# Patient Record
Sex: MALE | Race: WHITE | Employment: OTHER | URBAN - METROPOLITAN AREA
[De-identification: names, ages, dates, MRNs, and addresses within clinical notes are randomized per-mention and may not be internally consistent; named-entity substitution may affect disease eponyms.]

---

## 2018-08-20 ENCOUNTER — HOSPITAL ENCOUNTER (OUTPATIENT)
Dept: OTHER | Age: 83
Discharge: HOME | End: 2018-08-20
Attending: INTERNAL MEDICINE | Admitting: INTERNAL MEDICINE

## 2018-08-20 DIAGNOSIS — R10.84 ABDOMINAL PAIN, GENERALIZED: ICD-10-CM

## 2020-02-07 ENCOUNTER — HOSPITAL ENCOUNTER (OUTPATIENT)
Dept: GENERAL RADIOLOGY | Age: 85
Discharge: HOME | End: 2020-02-07
Payer: MEDICARE

## 2020-02-07 ENCOUNTER — HOSPITAL ENCOUNTER (OUTPATIENT)
Age: 85
Discharge: HOME | End: 2020-02-07
Payer: MEDICARE

## 2020-02-07 PROCEDURE — 73502 X-RAY EXAM HIP UNI 2-3 VIEWS: CPT

## 2020-02-19 ENCOUNTER — HOSPITAL ENCOUNTER (OUTPATIENT)
Age: 85
Discharge: HOME | End: 2020-02-19
Payer: MEDICARE

## 2020-02-19 ENCOUNTER — HOSPITAL ENCOUNTER (OUTPATIENT)
Dept: GENERAL RADIOLOGY | Age: 85
Discharge: HOME | End: 2020-02-19
Payer: MEDICARE

## 2020-02-19 PROCEDURE — 73030 X-RAY EXAM OF SHOULDER: CPT

## 2021-09-17 ENCOUNTER — HOSPITAL ENCOUNTER (OUTPATIENT)
Dept: WOUND CARE | Age: 86
Discharge: HOME | End: 2021-09-17
Payer: MEDICARE

## 2021-09-17 VITALS
DIASTOLIC BLOOD PRESSURE: 82 MMHG | WEIGHT: 189.82 LBS | RESPIRATION RATE: 16 BRPM | SYSTOLIC BLOOD PRESSURE: 147 MMHG | TEMPERATURE: 96.8 F | BODY MASS INDEX: 28.77 KG/M2 | HEIGHT: 68 IN | HEART RATE: 83 BPM

## 2021-09-17 PROCEDURE — 11042 DBRDMT SUBQ TIS 1ST 20SQCM/<: CPT

## 2021-09-17 PROCEDURE — 99203 OFFICE O/P NEW LOW 30 MIN: CPT

## 2021-09-17 ASSESSMENT — PAIN DESCRIPTION - FREQUENCY: FREQUENCY: INTERMITTENT

## 2021-09-17 ASSESSMENT — PAIN DESCRIPTION - LOCATION: LOCATION: LEG

## 2021-09-17 ASSESSMENT — PAIN SCALES - GENERAL: PAINLEVEL_OUTOF10: 7

## 2021-09-17 ASSESSMENT — PAIN DESCRIPTION - ORIENTATION: ORIENTATION: RIGHT

## 2021-09-17 ASSESSMENT — PAIN DESCRIPTION - DESCRIPTORS: DESCRIPTORS: TENDER

## 2021-09-17 ASSESSMENT — PAIN DESCRIPTION - PAIN TYPE: TYPE: ACUTE PAIN

## 2021-09-17 NOTE — PROGRESS NOTES
[facility] [place]      Elizabeth  RECORD NUMBER:  [id number]  AGE: 80 y.o. GENDER: male  : 5/3/1930  EPISODE DATE:  2021    Subjective:     Chief Complaint   Patient presents with    Wound Check     initial         HISTORY of PRESENT ILLNESS HPI     Barbie Holman is a 80 y.o. male who presents today for wound/ulcer evaluation. History of Wound Context: RIGHT posterior leg wound of uncertain etiology, but tenderness and drainage are of concern  Wound/Ulcer Pain Timing/Severity: intermittent, mild  Quality of pain: burning  Severity:  4 / 10   Modifying Factors: Pain worsens with debridement  Associated Signs/Symptoms: drainage and pain    Ulcer Identification:  Ulcer Type: venous    Contributing Factors: edema, decreased mobility and shear force    Acute Wound: Abrasion        PAST MEDICAL HISTORY        Diagnosis Date    Anxiety     Arthritis     Cancer ([address]     prostate    Cancer ([facility]     skin    History of blood clots     Hx of blood clots     MRSA infection 3/13/14    leg wound       PAST SURGICAL HISTORY    Past Surgical History:   Procedure Laterality Date    JOINT REPLACEMENT Right 2005    hip    KIDNEY SURGERY  2019    PROSTATE SURGERY         FAMILY HISTORY    History reviewed. No pertinent family history.     SOCIAL HISTORY    Social History     Tobacco Use    Smoking status: Former Smoker     Quit date: 3/13/1980     Years since quittin.5    Smokeless tobacco: Never Used    Tobacco comment: pipe smoker    Substance Use Topics    Alcohol use: Yes     Comment: beer occasional    Drug use: No       ALLERGIES    Allergies   Allergen Reactions    Sulfamethoxazole-Trimethoprim Rash     He claims he doesn't have allergies       MEDICATIONS    Current Outpatient Medications on File Prior to Encounter   Medication Sig Dispense Refill    aspirin 81 MG tablet Take 81 mg by mouth daily      ALPRAZolam (XANAX) 0.25 MG tablet Take 0.25 mg by mouth nightly as needed for Sleep      Multiple Vitamins-Minerals (MULTI VITAMIN/MINERALS) TABS Take  by mouth. No current facility-administered medications on file prior to encounter. REVIEW OF SYSTEMS    Pertinent items are noted in HPI. Last I2P if applicable: No results found for: LABA1C    Objective:      BP (!) 147/82   Pulse 83   Temp 96.8 °F (36 °C) (Temporal)   Resp 16   Ht 5' 8\" (1.727 m)   Wt 189 lb 13.1 oz (86.1 kg)   BMI 28.86 kg/m²     Wt Readings from Last 3 Encounters:   09/17/21 189 lb 13.1 oz (86.1 kg)       PHYSICAL EXAM    General Appearance: alert and oriented to person, place and time, well-developed and well-nourished, in no acute distress      Assessment:      No diagnosis found. Procedure Note  Indications:  Based on my examination of this patient's wound(s)/ulcer(s) today, debridement is required to promote healing and evaluate the wound base. Performed by: Dieter Crespo DPM    Consent obtained:  Yes    Time out taken:  Yes    Pain Control: Anesthetic  Anesthetic: 4% Lidocaine Liquid Topical       Debridement: Excisional Debridement    Using curette, scissors and forceps the wound(s)/ulcer(s) was/were debrided down through and including the removal of epidermis, dermis and subcutaneous tissue. Devitalized Tissue Debrided:  fibrin, biofilm, exudate and callus    Pre Debridement Measurements:  Are located in the [facility]  Documentation Flow Sheet    Wound/Ulcer #: 1    Post Debridement Measurements:  Wound/Ulcer Descriptions are Pre Debridement except measurements:    Wound 09/17/21 Leg Posterior;Right; Lower #1 (Active)   Wound Image   09/17/21 1328   Wound Etiology Venous 09/17/21 1415   Wound Cleansed Cleansed with saline 09/17/21 1328   Dressing/Treatment Honey gel/honey paste 09/17/21 1410   Wound Length (cm) 0.7 cm 09/17/21 1328   Wound Width (cm) 0.7 cm 09/17/21 1328   Wound Depth (cm) 0.2 cm 09/17/21 1328   Wound Surface Area (cm^2) 0.49 cm^2 09/17/21 1328   Wound Volume (cm^3) 0.098 cm^3 09/17/21 1328   Post-Procedure Length (cm) 0.8 cm 09/17/21 1406   Post-Procedure Width (cm) 0.8 cm 09/17/21 1406   Post-Procedure Depth (cm) 0.4 cm 09/17/21 1406   Post-Procedure Surface Area (cm^2) 0.64 cm^2 09/17/21 1406   Post-Procedure Volume (cm^3) 0.256 cm^3 09/17/21 1406   Distance Tunneling (cm) 0 cm 09/17/21 1328   Undermining Maxium Distance (cm) 0 09/17/21 1328   Wound Assessment Fibrin 09/17/21 1328   Drainage Amount Small 09/17/21 1328   Drainage Description Black 09/17/21 1328   Odor None 09/17/21 1328   Jacque-wound Assessment Fragile; Hemosiderin staining (brown yellow) 09/17/21 1328   Margins Defined edges 09/17/21 1328   Wound Thickness Description not for Pressure Injury Full thickness 09/17/21 1328   Number of days: 0          Percent of Wound(s)/Ulcer(s) Debrided: 100%    Total Surface Area Debrided:  0.64 sq cm     Diabetic/Pressure/Non Pressure Ulcers only:  Ulcer: Non-Pressure ulcer, fat layer exposed     Estimated Blood Loss:  Minimal    Hemostasis Achieved:  by pressure    Procedural Pain:  4  / 10     Post Procedural Pain:  1 / 10     Response to treatment:  Well tolerated by patient. Plan:   Plan is for serial debridement with applications of MediHoney and light compression, with weekly follow up    Treatment Note please see attached Discharge Instructions    New Medication(s) at this visit:   New Prescriptions    No medications on file       Other orders at this visit: No orders of the defined types were placed in this encounter. Smoking Cessation: Counseling given: Not Answered  Comment: pipe smoker       Written patient dismissal instructions given to patient and signed by patient or POA. Discharge [facility] Physician Orders and Discharge Instructions  [address] [place] [address]  Telephone: [phone]  NAME:  Louie Miles  YOB: 1930  MEDICAL RECORD NUMBER:  [id number]  DATE:  9/17/2021    Home Health Care:  [facility] Phone: [phone]  Fax: [phone]      Medically necessary services: [x] Skilled Nursing [] PT [] OT [] Social Work [] Dietician [] Other:    Wound Cleansing:   [x]0.9% Saline  []Vashe (Soak piece of gauze and place on wound bed for 5 minutes) []Other:    Jacque Wound Topical Treatments:  [] moisturizing lotion [] antifungal ointment [] No-Sting barrier film [] zinc paste [] Other:     Dressings:           Wound Location: right lower leg    Apply Primary Dressing to wound:       Honey gel     Cover and Secure with:     Cover and Secure with: 4X4 gauze pad  Conforming roll gauze   Avoid contact of tape with skin if possible.  Change dressing: [x] Daily (until the following date:____)   [] Every Other Day [] Once a week [] Do Not Change Dressing [] Other:  [] Three times per week: [] Monday, Wednesday, Friday [] Tuesday, Thursday, Saturday   . Change dressing:   Edema Control:  Apply: [] Compression Stocking  [] Left Lower Leg [] Right Lower Leg      [x]  Spandagrip   [] Left Lower Leg [x] Right Lower Leg    Strength: Medium compression 10-20 mm/Hg     Apply every morning immediately when getting up. They should be applied to affected leg(s) from mid foot to knee making sure to cover the heel. Remove every night before going to bed. [x] Elevate leg(s) above the level of the heart for 30 minutes 4-5 times a day and/or when sitting. [x] Avoid prolonged standing in one place. Dietary:  Important dietary reminders:  1. Increase Protein intake (i.e. Lean meats, fish, eggs, legumes, and yogurt)  2. No added salt  3. If diabetic, follow a diabetic diet and check glucose prior to meals or as instructed by your physician.     Dietary Supplements: []Remington []30ml ProStat []Ensure Flor Kamryn []Ensure Max/Premier []Other:  Take supplements twice a day or as directed as followed: If you are still having pain after you go home:   For wounds on lower legs or arms, elevate the affected limb.  Use over-the-counter medications you would normally use for pain as permitted by your primary care doctor.  For persistent pain not relieved by the above interventions, please call your primary care doctor. Return Appointment:   Return Appointment: With Dr Starla Almodovar  in  [facility])  o Schedule weekly until (Date):        [x] Orders placed during your visit: No orders of the defined types were placed in this encounter. Your nurse  is:  Martha Shaikh     Electronically signed by Jorge Avila RN on 9/17/2021 at 2:13 PM     [address] Information: Should you experience any significant changes in your wound(s) or have questions about your wound care, please contact the [address] [place] [address] at [phone]. Our hours vary so please leave a message. Please give us 24-48 hours to return your call. If you need help with your wounds and cannot wait until we are available, contact your PCP or go to the hospital emergency room. Call your doctor now or seek immediate medical care if:    · You have symptoms of infection, such as:  ? Increased pain, swelling, warmth, or redness. ? Red streaks leading from the area. ? Pus draining from the area. ? A fever.  Physician for this visit and orders:  Naga Bautista DPM 9/17/2021    [] Patient unable to sign Discharge Instructions given to ECF/Transportation/POA              Electronically signed by Naga Bautista DPM on 9/17/2021 at 3:21 PM

## 2021-09-24 ENCOUNTER — HOSPITAL ENCOUNTER (OUTPATIENT)
Dept: WOUND CARE | Age: 86
Discharge: HOME | End: 2021-09-24
Payer: MEDICARE

## 2021-09-24 VITALS
TEMPERATURE: 97.8 F | HEART RATE: 75 BPM | SYSTOLIC BLOOD PRESSURE: 132 MMHG | RESPIRATION RATE: 16 BRPM | DIASTOLIC BLOOD PRESSURE: 76 MMHG

## 2021-09-24 DIAGNOSIS — L97.313 NON-PRESSURE CHRONIC ULCER OF RIGHT ANKLE WITH NECROSIS OF MUSCLE (HCC): ICD-10-CM

## 2021-09-24 PROCEDURE — 11043 DBRDMT MUSC&/FSCA 1ST 20/<: CPT

## 2021-09-24 RX ORDER — CLOBETASOL PROPIONATE 0.5 MG/G
OINTMENT TOPICAL ONCE
OUTPATIENT
Start: 2021-09-24 | End: 2021-09-24

## 2021-09-24 RX ORDER — BETAMETHASONE DIPROPIONATE 0.05 %
OINTMENT (GRAM) TOPICAL ONCE
OUTPATIENT
Start: 2021-09-24 | End: 2021-09-24

## 2021-09-24 RX ORDER — LIDOCAINE HYDROCHLORIDE 40 MG/ML
SOLUTION TOPICAL ONCE
Status: CANCELLED | OUTPATIENT
Start: 2021-09-24 | End: 2021-09-24

## 2021-09-24 RX ORDER — LIDOCAINE HYDROCHLORIDE 40 MG/ML
2.5 SOLUTION TOPICAL ONCE
Status: DISCONTINUED | OUTPATIENT
Start: 2021-09-24 | End: 2021-09-25 | Stop reason: HOSPADM

## 2021-09-24 RX ORDER — LIDOCAINE 40 MG/G
CREAM TOPICAL ONCE
OUTPATIENT
Start: 2021-09-24 | End: 2021-09-24

## 2021-09-24 RX ORDER — GINSENG 100 MG
CAPSULE ORAL ONCE
OUTPATIENT
Start: 2021-09-24 | End: 2021-09-24

## 2021-09-24 RX ORDER — LIDOCAINE HYDROCHLORIDE 20 MG/ML
JELLY TOPICAL ONCE
OUTPATIENT
Start: 2021-09-24 | End: 2021-09-24

## 2021-09-24 RX ORDER — LIDOCAINE 50 MG/G
OINTMENT TOPICAL ONCE
OUTPATIENT
Start: 2021-09-24 | End: 2021-09-24

## 2021-09-24 RX ORDER — BACITRACIN, NEOMYCIN, POLYMYXIN B 400; 3.5; 5 [USP'U]/G; MG/G; [USP'U]/G
OINTMENT TOPICAL ONCE
OUTPATIENT
Start: 2021-09-24 | End: 2021-09-24

## 2021-09-24 RX ORDER — BACITRACIN ZINC AND POLYMYXIN B SULFATE 500; 1000 [USP'U]/G; [USP'U]/G
OINTMENT TOPICAL ONCE
OUTPATIENT
Start: 2021-09-24 | End: 2021-09-24

## 2021-09-24 RX ORDER — GENTAMICIN SULFATE 1 MG/G
OINTMENT TOPICAL ONCE
OUTPATIENT
Start: 2021-09-24 | End: 2021-09-24

## 2021-09-24 ASSESSMENT — PAIN DESCRIPTION - PAIN TYPE: TYPE: ACUTE PAIN

## 2021-09-24 ASSESSMENT — PAIN DESCRIPTION - DESCRIPTORS: DESCRIPTORS: TENDER

## 2021-09-24 ASSESSMENT — PAIN DESCRIPTION - FREQUENCY: FREQUENCY: INTERMITTENT

## 2021-09-24 ASSESSMENT — PAIN DESCRIPTION - ORIENTATION: ORIENTATION: RIGHT

## 2021-09-24 ASSESSMENT — PAIN SCALES - GENERAL: PAINLEVEL_OUTOF10: 7

## 2021-09-24 ASSESSMENT — PAIN DESCRIPTION - LOCATION: LOCATION: LEG

## 2021-09-24 NOTE — PROGRESS NOTES
[facility]  Progress Note and Procedure Note      Temitope Galvan RECORD NUMBER:  [id number]  AGE: 80 y.o. GENDER: male  : 5/3/1930  EPISODE DATE:  2021    Subjective:     Chief Complaint   Patient presents with    Wound Check     right leg         HISTORY of PRESENT ILLNESS HPI     Dwight Ro is a 80 y.o. male who presents today for wound/ulcer evaluation. History of Wound Context: RIGHT posterior calf wound is smaller with greater granulation after having used MediHoney  Wound/Ulcer Pain Timing/Severity: intermittent, moderate  Quality of pain: burning  Severity:  3  10   Modifying Factors: Pain worsens with debridement  Associated Signs/Symptoms: edema and pain    Ulcer Identification:  Ulcer Type: venous    Contributing Factors: edema, venous stasis and shear force    Acute Wound: N/A        PAST MEDICAL HISTORY        Diagnosis Date    Anxiety     Arthritis     Cancer ([address]     prostate    Cancer ([facility]     skin    History of blood clots     Hx of blood clots     MRSA infection 3/13/14    leg wound       PAST SURGICAL HISTORY    Past Surgical History:   Procedure Laterality Date    JOINT REPLACEMENT Right 2005    hip    KIDNEY SURGERY  2019    PROSTATE SURGERY         FAMILY HISTORY    History reviewed. No pertinent family history.     SOCIAL HISTORY    Social History     Tobacco Use    Smoking status: Former Smoker     Quit date: 3/13/1980     Years since quittin.5    Smokeless tobacco: Never Used    Tobacco comment: pipe smoker    Substance Use Topics    Alcohol use: Yes     Comment: beer occasional    Drug use: No       ALLERGIES    Allergies   Allergen Reactions    Sulfamethoxazole-Trimethoprim Rash     He claims he doesn't have allergies       MEDICATIONS    Current Outpatient Medications on File Prior to Encounter   Medication Sig Dispense Refill    aspirin 81 MG tablet Take 81 mg by mouth daily      ALPRAZolam (XANAX) 0.25 MG tablet Take 0.25 mg by mouth nightly as needed for Sleep      Multiple Vitamins-Minerals (MULTI VITAMIN/MINERALS) TABS Take  by mouth. No current facility-administered medications on file prior to encounter. REVIEW OF SYSTEMS    Pertinent items are noted in HPI. Last P6V if applicable: No results found for: LABA1C    Objective:      /76   Pulse 75   Temp 97.8 °F (36.6 °C) (Temporal)   Resp 16     Wt Readings from Last 3 Encounters:   09/17/21 189 lb 13.1 oz (86.1 kg)       PHYSICAL EXAM    General Appearance: alert and oriented to person, place and time, well-developed and well-nourished, in no acute distress      Assessment:      1. 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 Performed by: Konrad Castro DPM    Consent obtained:  Yes    Time out taken:  Yes    Pain Control: Anesthetic  Anesthetic: 4% Lidocaine Liquid Topical       Debridement: Excisional Debridement    Using curette and forceps the wound(s)/ulcer(s) was/were debrided down through and including the removal of epidermis, dermis, subcutaneous tissue and muscle/fascia. Devitalized Tissue Debrided:  fibrin, biofilm, slough, exudate and callus    Pre Debridement Measurements:  Are located in the [place]  Documentation Flow Sheet    Wound/Ulcer #: 1    Post Debridement Measurements:  Wound/Ulcer Descriptions are Pre Debridement except measurements:    Wound 09/17/21 Leg Posterior;Right; Lower #1 (Active)   Wound Image   09/17/21 1328   Wound Etiology Venous 09/17/21 1415   Wound Cleansed Cleansed with saline 09/24/21 1315   Dressing/Treatment Collagen with Ag 09/24/21 1335   Wound Length (cm) 0.2 cm 09/24/21 1315   Wound Width (cm) 0.2 cm 09/24/21 1315   Wound Depth (cm) 0.2 cm 09/24/21 1315   Wound Surface Area (cm^2) 0.04 cm^2 09/24/21 1315   Change in Wound Size % (l*w) 91.84 09/24/21 1315   Wound Volume (cm^3) 0.008 cm^3 09/24/21 1315   Wound Healing % 92 09/24/21 1315   Post-Procedure Length (cm) 0.3 cm 09/24/21 1335   Post-Procedure Width (cm) 0.3 cm 09/24/21 1335   Post-Procedure Depth (cm) 0.4 cm 09/24/21 1335   Post-Procedure Surface Area (cm^2) 0.09 cm^2 09/24/21 1335   Post-Procedure Volume (cm^3) 0.036 cm^3 09/24/21 1335   Distance Tunneling (cm) 0 cm 09/24/21 1315   Undermining Maxium Distance (cm) 0 09/24/21 1315   Wound Assessment Fibrin 09/24/21 1315   Drainage Amount Small 09/24/21 1315   Drainage Description Yellow 09/24/21 1315   Odor None 09/24/21 1315   Jacque-wound Assessment Fragile; Hemosiderin staining (brown yellow) 09/24/21 1315   Margins Defined edges 09/24/21 1315   Wound Thickness Description not for Pressure Injury Full thickness 09/24/21 1315   Number of days: 7          Percent of Wound(s)/Ulcer(s) Debrided: 100%    Total Surface Area Debrided:  0.09 sq cm     Diabetic/Pressure/Non Pressure Ulcers only:  Ulcer: Non-Pressure ulcer, muscle necrosis     Estimated Blood Loss:  Minimal    Hemostasis Achieved:  by pressure    Procedural Pain:  5  / 10     Post Procedural Pain:  2 / 10     Response to treatment:  Well tolerated by patient. Plan:   Conversion from [facility] to [facility] now that wound has greater granulation    Treatment Note please see attached Discharge Instructions    New Medication(s) at this visit:   New Prescriptions    No medications on file       Other orders at this visit: No orders of the defined types were placed in this encounter. Smoking Cessation: Counseling given: Not Answered  Comment: pipe smoker       Written patient dismissal instructions given to patient and signed by patient or POA. Discharge [address] [facility] Physician Orders and Discharge Instructions  The [address] [place] [address] E. [address] [place] [address]. [facility]  Telephone: [phone]  NAME:  Fermin Lenz OF BIRTH:  5/3/1930  MEDICAL RECORD NUMBER:  [id number]  DATE:  9/24/2021    Home Health Care:  [facility] Phone: [phone]  Fax: [phone]      Medically necessary services: [x] Skilled Nursing [] PT [] OT [] Social Work [] Dietician [] Other:    Wound Cleansing:   [x]0.9% Saline  []Vashe (Soak piece of gauze and place on wound bed for 5 minutes) []Other:    Jacque Wound Topical Treatments:  [] moisturizing lotion [] antifungal ointment [] No-Sting barrier film [] zinc paste [] Other:     Dressings:           Wound Location: right lower leg    Apply Primary Dressing to wound:       Collagen with silver     Cover and Secure with:     Cover and Secure with: 4X4 gauze pad  Conforming roll gauze   Avoid contact of tape with skin if possible.  Change dressing: [] Daily (until the following date:____)   [] Every Other Day [] Once a week [] Do Not Change Dressing [] Other:  [x] Three times per week: [x] Monday, Wednesday, Friday [] Tuesday, Thursday, Saturday          Edema Control:  Apply: [] Compression Stocking  [] Left Lower Leg [] Right Lower Leg      [x]  Spandagrip   [] Left Lower Leg [x] Right Lower Leg    Strength: Medium compression 10-20 mm/Hg     Apply every morning immediately when getting up. They should be applied to affected leg(s) from mid foot to knee making sure to cover the heel. Remove every night before going to bed. [x] Elevate leg(s) above the level of the heart for 30 minutes 4-5 times a day and/or when sitting. [x] Avoid prolonged standing in one place. Dietary:  Important dietary reminders:  1. Increase Protein intake (i.e. Lean meats, fish, eggs, legumes, and yogurt)  2. No added salt  3. If diabetic, follow a diabetic diet and check glucose prior to meals or as instructed by your physician.     Dietary Supplements: []Remington []30ml ProStat []Ensure Enlive []Ensure Max/Premier []Other:  Take supplements twice a day or as directed as followed: If you are still having pain after you go home:   For wounds on lower legs or arms, elevate the affected limb.  Use over-the-counter medications you would normally use for pain as permitted by your primary care doctor.  For persistent pain not relieved by the above interventions, please call your primary care doctor. Return Appointment:   Return Appointment: With Dr Rosana Smith  in  1 [facility])  o Schedule weekly until (Date):      o All other future appointments:  No future appointments. [x] Orders placed during your visit: No orders of the defined types were placed in this encounter. Your nurse  is:  Brandon Abdalla     Electronically signed by Stiven Power RN on 9/24/2021 at [facility] Information: Should you experience any significant changes in your wound(s) or have questions about your wound care, please contact the [address] [place] [address] at [phone]. Our hours vary so please leave a message. Please give us 24-48 hours to return your call. If you need help with your wounds and cannot wait until we are available, contact your PCP or go to the hospital emergency room. Call your doctor now or seek immediate medical care if:    · You have symptoms of infection, such as:  ? Increased pain, swelling, warmth, or redness. ? Red streaks leading from the area. ? Pus draining from the area. ? A fever.  Physician for this visit and orders:  Yvonne Michael DPM 9/24/2021    [] Patient unable to sign Discharge Instructions given to ECF/Transportation/POA              Electronically signed by Yvonne Michael DPM on 9/24/2021 at 1:43 PM

## 2021-10-01 ENCOUNTER — HOSPITAL ENCOUNTER (OUTPATIENT)
Dept: WOUND CARE | Age: 86
Discharge: HOME | End: 2021-10-01
Payer: MEDICARE

## 2021-10-01 VITALS
TEMPERATURE: 97.8 F | SYSTOLIC BLOOD PRESSURE: 163 MMHG | DIASTOLIC BLOOD PRESSURE: 86 MMHG | HEART RATE: 71 BPM | RESPIRATION RATE: 18 BRPM

## 2021-10-01 DIAGNOSIS — L97.313 NON-PRESSURE CHRONIC ULCER OF RIGHT ANKLE WITH NECROSIS OF MUSCLE (HCC): Primary | ICD-10-CM

## 2021-10-01 PROCEDURE — 99212 OFFICE O/P EST SF 10 MIN: CPT

## 2021-10-01 PROCEDURE — 6370000000 HC RX 637 (ALT 250 FOR IP): Performed by: PODIATRIST

## 2021-10-01 RX ORDER — BACITRACIN, NEOMYCIN, POLYMYXIN B 400; 3.5; 5 [USP'U]/G; MG/G; [USP'U]/G
OINTMENT TOPICAL ONCE
OUTPATIENT
Start: 2021-10-01 | End: 2021-10-01

## 2021-10-01 RX ORDER — LIDOCAINE 40 MG/G
CREAM TOPICAL ONCE
OUTPATIENT
Start: 2021-10-01 | End: 2021-10-01

## 2021-10-01 RX ORDER — BETAMETHASONE DIPROPIONATE 0.05 %
OINTMENT (GRAM) TOPICAL ONCE
OUTPATIENT
Start: 2021-10-01 | End: 2021-10-01

## 2021-10-01 RX ORDER — GINSENG 100 MG
CAPSULE ORAL ONCE
OUTPATIENT
Start: 2021-10-01 | End: 2021-10-01

## 2021-10-01 RX ORDER — LIDOCAINE 50 MG/G
OINTMENT TOPICAL ONCE
OUTPATIENT
Start: 2021-10-01 | End: 2021-10-01

## 2021-10-01 RX ORDER — CLOBETASOL PROPIONATE 0.5 MG/G
OINTMENT TOPICAL ONCE
OUTPATIENT
Start: 2021-10-01 | End: 2021-10-01

## 2021-10-01 RX ORDER — BACITRACIN ZINC AND POLYMYXIN B SULFATE 500; 1000 [USP'U]/G; [USP'U]/G
OINTMENT TOPICAL ONCE
OUTPATIENT
Start: 2021-10-01 | End: 2021-10-01

## 2021-10-01 RX ORDER — LIDOCAINE HYDROCHLORIDE 20 MG/ML
JELLY TOPICAL ONCE
OUTPATIENT
Start: 2021-10-01 | End: 2021-10-01

## 2021-10-01 RX ORDER — LIDOCAINE HYDROCHLORIDE 40 MG/ML
SOLUTION TOPICAL ONCE
Status: COMPLETED | OUTPATIENT
Start: 2021-10-01 | End: 2021-10-01

## 2021-10-01 RX ORDER — LIDOCAINE HYDROCHLORIDE 40 MG/ML
SOLUTION TOPICAL ONCE
OUTPATIENT
Start: 2021-10-01 | End: 2021-10-01

## 2021-10-01 RX ORDER — GENTAMICIN SULFATE 1 MG/G
OINTMENT TOPICAL ONCE
OUTPATIENT
Start: 2021-10-01 | End: 2021-10-01

## 2021-10-01 RX ADMIN — LIDOCAINE HYDROCHLORIDE: 40 SOLUTION TOPICAL at 14:31

## 2021-10-01 NOTE — PROGRESS NOTES
[facility] [place] and [place] Note      Joan Chen RECORD NUMBER:  [id number]  AGE: 80 y.o. GENDER: male  : 5/3/1930  EPISODE DATE:  10/1/2021    Subjective:     Chief Complaint   Patient presents with    Wound Check     right leg         HISTORY of PRESENT ILLNESS HPI     Sylvia Sneed is a 80 y.o. male who presents today for wound/ulcer evaluation. History of Wound Context: RIGHT posterior calf wound now resolved  Wound/Ulcer Pain Timing/Severity: none  Quality of pain: N/A  Severity:  0 / 10   Modifying Factors: None  Associated Signs/Symptoms: edema    Ulcer Identification:  Ulcer Type: venous    Contributing Factors: edema and shear force    Acute Wound: N/A        PAST MEDICAL HISTORY        Diagnosis Date    Anxiety     Arthritis     Cancer ([address]     prostate    Cancer [address] [address]     skin    History of blood clots     Hx of blood clots     MRSA infection 3/13/14    leg wound       PAST SURGICAL HISTORY    Past Surgical History:   Procedure Laterality Date    JOINT REPLACEMENT Right 2005    hip    KIDNEY SURGERY  2019    PROSTATE SURGERY         FAMILY HISTORY    History reviewed. No pertinent family history.     SOCIAL HISTORY    Social History     Tobacco Use    Smoking status: Former Smoker     Quit date: 3/13/1980     Years since quittin.5    Smokeless tobacco: Never Used    Tobacco comment: pipe smoker    Substance Use Topics    Alcohol use: Yes     Comment: beer occasional    Drug use: No       ALLERGIES    Allergies   Allergen Reactions    Sulfamethoxazole-Trimethoprim Rash     He claims he doesn't have allergies       MEDICATIONS    Current Outpatient Medications on File Prior to Encounter   Medication Sig Dispense Refill    aspirin 81 MG tablet Take 81 mg by mouth daily      ALPRAZolam (XANAX) 0.25 MG tablet Take 0.25 mg by mouth nightly as needed for Sleep      Multiple Vitamins-Minerals (MULTI VITAMIN/MINERALS) TABS Take  by mouth.       No current facility-administered medications on file prior to encounter. REVIEW OF SYSTEMS    Pertinent items are noted in HPI. Last J0K if applicable: No results found for: LABA1C    Objective:      BP (!) 163/86   Pulse 71   Temp 97.8 °F (36.6 °C) (Temporal)   Resp 18     Wt Readings from Last 3 Encounters:   09/17/21 189 lb 13.1 oz (86.1 kg)       PHYSICAL EXAM    General Appearance: alert and oriented to person, place and time, well-developed and well-nourished, in no acute distress      Assessment:      1. 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 Performed by: Jonelle Sykse DPM    Consent obtained:  No: no    Time out taken:  No: no    Pain Control: Anesthetic  Anesthetic: 4% Lidocaine Liquid Topical       Debridement: Other: NONE    Using none the wound(s)/ulcer(s) was/were not debrided down through and including the removal of none. Devitalized Tissue Debrided:  none    Pre Debridement Measurements:  Are located in the Wound/Ulcer Documentation Flow Sheet    Wound/Ulcer #: 1    Post Debridement Measurements:  Wound/Ulcer Descriptions are Pre Debridement except measurements:            Percent of Wound(s)/Ulcer(s) Debrided: 0%    Total Surface Area Debrided:  00 sq cm     Diabetic/Pressure/Non Pressure Ulcers only:  Ulcer: Non-Pressure ulcer, limited to breakdown of skin     Estimated Blood Loss:  None    Hemostasis Achieved:  not needed    Procedural Pain:  0  / 10     Post Procedural Pain:  0 / 10     Response to treatment:  Well tolerated by patient.      Plan:   Continued antibacterial dressing to area for hygiene, but otherwise healed wound, no further care necessary    Treatment Note please see attached Discharge Instructions    New Medication(s) at this visit:   New Prescriptions    No medications on file       Other orders at this visit:   Orders Placed This Encounter   Procedures    Initiate Outpatient Wound Care Protocol       Smoking Cessation: Counseling given: Not Answered  Comment: pipe smoker       Written patient dismissal instructions given to patient and signed by patient or POA. Discharge Elwood Lázaro   [facility]  Telephone: [phone]    NAME:  Diony Reynoso OF BIRTH:  5/3/1930  MEDICAL RECORD NUMBER:  [id number]  DATE:  10/1/2021      Congratulations! You have completed your treatment program!    To prevent Venous Wounds from recurring again:  · Elevate your legs at least parallel to the ground while sitting. · Wear your prescription support stockings everyday and remove at night while you sleep. · Replace your stockings every 3-6 months so that your legs continue to get the support they need. · Inspect your legs and feet daily and report any increased swelling, unusual redness or new wounds to your physician. · Wash your legs and feet regularly with mild soap and water and moisturize daily. · Continue to use compression pumps if prescribed by your physician. · Avoid overeating. Extra weight adds pressure on the veins in your legs. · Limit salty foods as they promote swelling or fluid retention in your legs. Call the [facility] [address] if your wound reopens, if new wounds occur, or if you have any other questions about your follow up care [phone].      [] Patient unable to sign Discharge Instructions given to ECF/Transportation/POA    Electronically signed by Dayanna Oliveira RN on 10/1/2021 at 2:51 PM            Electronically signed by Greer Cain DPM on 10/1/2021 at 2:55 PM

## 2022-05-12 ENCOUNTER — HOSPITAL ENCOUNTER (INPATIENT)
Age: 87
LOS: 5 days | Discharge: HOME HEALTH | DRG: 175 | End: 2022-05-18
Attending: EMERGENCY MEDICINE | Admitting: INTERNAL MEDICINE
Payer: MEDICARE

## 2022-05-12 ENCOUNTER — APPOINTMENT (OUTPATIENT)
Dept: GENERAL RADIOLOGY | Age: 87
DRG: 175 | End: 2022-05-12
Payer: MEDICARE

## 2022-05-12 DIAGNOSIS — R07.9 CHEST PAIN, UNSPECIFIED TYPE: ICD-10-CM

## 2022-05-12 DIAGNOSIS — J18.9 PNEUMONIA OF LEFT LOWER LOBE DUE TO INFECTIOUS ORGANISM: Primary | ICD-10-CM

## 2022-05-12 DIAGNOSIS — N17.9 AKI (ACUTE KIDNEY INJURY) (HCC): ICD-10-CM

## 2022-05-12 LAB
ANION GAP SERPL CALCULATED.3IONS-SCNC: 13 MMOL/L (ref 3–16)
BASE EXCESS VENOUS: 0.5 MMOL/L (ref -2–3)
BASOPHILS ABSOLUTE: 0 K/UL (ref 0–0.2)
BASOPHILS RELATIVE PERCENT: 0.4 %
BUN BLDV-MCNC: 26 MG/DL (ref 7–20)
CALCIUM SERPL-MCNC: 9.4 MG/DL (ref 8.3–10.6)
CARBOXYHEMOGLOBIN: 1.4 % (ref 0–1.5)
CHLORIDE BLD-SCNC: 102 MMOL/L (ref 99–110)
CO2: 22 MMOL/L (ref 21–32)
CREAT SERPL-MCNC: 2 MG/DL (ref 0.8–1.3)
EOSINOPHILS ABSOLUTE: 0.1 K/UL (ref 0–0.6)
EOSINOPHILS RELATIVE PERCENT: 0.9 %
GFR AFRICAN AMERICAN: 38
GFR NON-AFRICAN AMERICAN: 31
GLUCOSE BLD-MCNC: 127 MG/DL (ref 70–99)
HCO3 VENOUS: 24.7 MMOL/L (ref 24–28)
HCT VFR BLD CALC: 39.5 % (ref 40.5–52.5)
HEMOGLOBIN, VEN, REDUCED: 18.1 %
HEMOGLOBIN: 13.3 G/DL (ref 13.5–17.5)
LYMPHOCYTES ABSOLUTE: 1.5 K/UL (ref 1–5.1)
LYMPHOCYTES RELATIVE PERCENT: 21.2 %
MCH RBC QN AUTO: 32.1 PG (ref 26–34)
MCHC RBC AUTO-ENTMCNC: 33.6 G/DL (ref 31–36)
MCV RBC AUTO: 95.6 FL (ref 80–100)
METHEMOGLOBIN VENOUS: 0.1 % (ref 0–1.5)
MONOCYTES ABSOLUTE: 0.7 K/UL (ref 0–1.3)
MONOCYTES RELATIVE PERCENT: 9.9 %
NEUTROPHILS ABSOLUTE: 4.8 K/UL (ref 1.7–7.7)
NEUTROPHILS RELATIVE PERCENT: 67.6 %
O2 SAT, VEN: 82 %
PCO2, VEN: 37.5 MMHG (ref 41–51)
PDW BLD-RTO: 14.1 % (ref 12.4–15.4)
PH VENOUS: 7.43 (ref 7.35–7.45)
PLATELET # BLD: 132 K/UL (ref 135–450)
PMV BLD AUTO: 7.7 FL (ref 5–10.5)
PO2, VEN: 43.2 MMHG (ref 25–40)
POTASSIUM REFLEX MAGNESIUM: 4 MMOL/L (ref 3.5–5.1)
PRO-BNP: 880 PG/ML (ref 0–449)
RBC # BLD: 4.14 M/UL (ref 4.2–5.9)
SODIUM BLD-SCNC: 137 MMOL/L (ref 136–145)
TCO2 CALC VENOUS: 26 MMOL/L
TROPONIN: <0.01 NG/ML
WBC # BLD: 7.1 K/UL (ref 4–11)

## 2022-05-12 PROCEDURE — 80048 BASIC METABOLIC PNL TOTAL CA: CPT

## 2022-05-12 PROCEDURE — 36415 COLL VENOUS BLD VENIPUNCTURE: CPT

## 2022-05-12 PROCEDURE — 85025 COMPLETE CBC W/AUTO DIFF WBC: CPT

## 2022-05-12 PROCEDURE — 84484 ASSAY OF TROPONIN QUANT: CPT

## 2022-05-12 PROCEDURE — 82803 BLOOD GASES ANY COMBINATION: CPT

## 2022-05-12 PROCEDURE — 93005 ELECTROCARDIOGRAM TRACING: CPT | Performed by: EMERGENCY MEDICINE

## 2022-05-12 PROCEDURE — 83880 ASSAY OF NATRIURETIC PEPTIDE: CPT

## 2022-05-12 PROCEDURE — 71046 X-RAY EXAM CHEST 2 VIEWS: CPT

## 2022-05-12 PROCEDURE — 99285 EMERGENCY DEPT VISIT HI MDM: CPT

## 2022-05-12 ASSESSMENT — ENCOUNTER SYMPTOMS
NAUSEA: 0
ABDOMINAL PAIN: 0
COUGH: 0
SHORTNESS OF BREATH: 0
VOMITING: 0

## 2022-05-12 ASSESSMENT — PAIN DESCRIPTION - ORIENTATION: ORIENTATION: LEFT

## 2022-05-12 ASSESSMENT — PAIN - FUNCTIONAL ASSESSMENT: PAIN_FUNCTIONAL_ASSESSMENT: 0-10

## 2022-05-12 ASSESSMENT — PAIN DESCRIPTION - LOCATION: LOCATION: CHEST

## 2022-05-12 ASSESSMENT — PAIN DESCRIPTION - FREQUENCY: FREQUENCY: CONTINUOUS

## 2022-05-12 ASSESSMENT — PAIN SCALES - GENERAL: PAINLEVEL_OUTOF10: 8

## 2022-05-13 ENCOUNTER — APPOINTMENT (OUTPATIENT)
Dept: VASCULAR LAB | Age: 87
DRG: 175 | End: 2022-05-13
Payer: MEDICARE

## 2022-05-13 ENCOUNTER — APPOINTMENT (OUTPATIENT)
Dept: CT IMAGING | Age: 87
DRG: 175 | End: 2022-05-13
Payer: MEDICARE

## 2022-05-13 PROBLEM — J18.9 HCAP (HEALTHCARE-ASSOCIATED PNEUMONIA): Status: ACTIVE | Noted: 2022-05-13

## 2022-05-13 LAB
ANION GAP SERPL CALCULATED.3IONS-SCNC: 6 MMOL/L (ref 3–16)
APTT: 30.3 SEC (ref 26.2–38.6)
BUN BLDV-MCNC: 24 MG/DL (ref 7–20)
CALCIUM SERPL-MCNC: 8.9 MG/DL (ref 8.3–10.6)
CHLORIDE BLD-SCNC: 107 MMOL/L (ref 99–110)
CO2: 25 MMOL/L (ref 21–32)
CREAT SERPL-MCNC: 1.7 MG/DL (ref 0.8–1.3)
D DIMER: 3213 NG/ML DDU (ref 0–229)
EKG ATRIAL RATE: 312 BPM
EKG ATRIAL RATE: 81 BPM
EKG DIAGNOSIS: NORMAL
EKG DIAGNOSIS: NORMAL
EKG P AXIS: 0 DEGREES
EKG P-R INTERVAL: 136 MS
EKG Q-T INTERVAL: 388 MS
EKG Q-T INTERVAL: 410 MS
EKG QRS DURATION: 86 MS
EKG QRS DURATION: 92 MS
EKG QTC CALCULATION (BAZETT): 445 MS
EKG QTC CALCULATION (BAZETT): 450 MS
EKG R AXIS: -14 DEGREES
EKG R AXIS: 11 DEGREES
EKG T AXIS: 17 DEGREES
EKG T AXIS: 31 DEGREES
EKG VENTRICULAR RATE: 71 BPM
EKG VENTRICULAR RATE: 81 BPM
GFR AFRICAN AMERICAN: 46
GFR NON-AFRICAN AMERICAN: 38
GLUCOSE BLD-MCNC: 121 MG/DL (ref 70–99)
HCT VFR BLD CALC: 39.8 % (ref 40.5–52.5)
HEMOGLOBIN: 13.2 G/DL (ref 13.5–17.5)
INR BLD: 1.11 (ref 0.88–1.12)
MCH RBC QN AUTO: 32 PG (ref 26–34)
MCHC RBC AUTO-ENTMCNC: 33.1 G/DL (ref 31–36)
MCV RBC AUTO: 96.6 FL (ref 80–100)
PDW BLD-RTO: 14.2 % (ref 12.4–15.4)
PLATELET # BLD: 133 K/UL (ref 135–450)
PMV BLD AUTO: 8.5 FL (ref 5–10.5)
POTASSIUM REFLEX MAGNESIUM: 4.3 MMOL/L (ref 3.5–5.1)
PROCALCITONIN: 0.04 NG/ML (ref 0–0.15)
PROTHROMBIN TIME: 12.6 SEC (ref 9.9–12.7)
RBC # BLD: 4.11 M/UL (ref 4.2–5.9)
SODIUM BLD-SCNC: 138 MMOL/L (ref 136–145)
TROPONIN: <0.01 NG/ML
TROPONIN: <0.01 NG/ML
WBC # BLD: 7.8 K/UL (ref 4–11)

## 2022-05-13 PROCEDURE — 6370000000 HC RX 637 (ALT 250 FOR IP): Performed by: INTERNAL MEDICINE

## 2022-05-13 PROCEDURE — 85610 PROTHROMBIN TIME: CPT

## 2022-05-13 PROCEDURE — 2580000003 HC RX 258: Performed by: INTERNAL MEDICINE

## 2022-05-13 PROCEDURE — 36415 COLL VENOUS BLD VENIPUNCTURE: CPT

## 2022-05-13 PROCEDURE — 96365 THER/PROPH/DIAG IV INF INIT: CPT

## 2022-05-13 PROCEDURE — 71250 CT THORAX DX C-: CPT

## 2022-05-13 PROCEDURE — 85379 FIBRIN DEGRADATION QUANT: CPT

## 2022-05-13 PROCEDURE — 99222 1ST HOSP IP/OBS MODERATE 55: CPT | Performed by: NURSE PRACTITIONER

## 2022-05-13 PROCEDURE — 80048 BASIC METABOLIC PNL TOTAL CA: CPT

## 2022-05-13 PROCEDURE — 85520 HEPARIN ASSAY: CPT

## 2022-05-13 PROCEDURE — 92610 EVALUATE SWALLOWING FUNCTION: CPT

## 2022-05-13 PROCEDURE — 93005 ELECTROCARDIOGRAM TRACING: CPT | Performed by: INTERNAL MEDICINE

## 2022-05-13 PROCEDURE — 85027 COMPLETE CBC AUTOMATED: CPT

## 2022-05-13 PROCEDURE — 6360000002 HC RX W HCPCS: Performed by: INTERNAL MEDICINE

## 2022-05-13 PROCEDURE — 84145 PROCALCITONIN (PCT): CPT

## 2022-05-13 PROCEDURE — 6360000002 HC RX W HCPCS: Performed by: EMERGENCY MEDICINE

## 2022-05-13 PROCEDURE — 87641 MR-STAPH DNA AMP PROBE: CPT

## 2022-05-13 PROCEDURE — 85730 THROMBOPLASTIN TIME PARTIAL: CPT

## 2022-05-13 PROCEDURE — 1200000000 HC SEMI PRIVATE

## 2022-05-13 PROCEDURE — 93970 EXTREMITY STUDY: CPT

## 2022-05-13 PROCEDURE — 2580000003 HC RX 258: Performed by: EMERGENCY MEDICINE

## 2022-05-13 PROCEDURE — 96367 TX/PROPH/DG ADDL SEQ IV INF: CPT

## 2022-05-13 PROCEDURE — 84484 ASSAY OF TROPONIN QUANT: CPT

## 2022-05-13 RX ORDER — ACETAMINOPHEN 325 MG/1
650 TABLET ORAL EVERY 6 HOURS PRN
Status: DISCONTINUED | OUTPATIENT
Start: 2022-05-13 | End: 2022-05-18 | Stop reason: HOSPADM

## 2022-05-13 RX ORDER — SODIUM CHLORIDE 0.9 % (FLUSH) 0.9 %
5-40 SYRINGE (ML) INJECTION EVERY 12 HOURS SCHEDULED
Status: DISCONTINUED | OUTPATIENT
Start: 2022-05-13 | End: 2022-05-18 | Stop reason: HOSPADM

## 2022-05-13 RX ORDER — POLYETHYLENE GLYCOL 3350 17 G/17G
17 POWDER, FOR SOLUTION ORAL DAILY PRN
Status: DISCONTINUED | OUTPATIENT
Start: 2022-05-13 | End: 2022-05-18 | Stop reason: HOSPADM

## 2022-05-13 RX ORDER — SODIUM CHLORIDE 9 MG/ML
INJECTION, SOLUTION INTRAVENOUS CONTINUOUS
Status: ACTIVE | OUTPATIENT
Start: 2022-05-13 | End: 2022-05-13

## 2022-05-13 RX ORDER — ONDANSETRON 4 MG/1
4 TABLET, ORALLY DISINTEGRATING ORAL EVERY 8 HOURS PRN
Status: DISCONTINUED | OUTPATIENT
Start: 2022-05-13 | End: 2022-05-18 | Stop reason: HOSPADM

## 2022-05-13 RX ORDER — HEPARIN SODIUM 5000 [USP'U]/ML
5000 INJECTION, SOLUTION INTRAVENOUS; SUBCUTANEOUS EVERY 8 HOURS SCHEDULED
Status: DISCONTINUED | OUTPATIENT
Start: 2022-05-13 | End: 2022-05-13

## 2022-05-13 RX ORDER — SODIUM CHLORIDE 0.9 % (FLUSH) 0.9 %
5-40 SYRINGE (ML) INJECTION PRN
Status: DISCONTINUED | OUTPATIENT
Start: 2022-05-13 | End: 2022-05-18 | Stop reason: HOSPADM

## 2022-05-13 RX ORDER — HEPARIN SODIUM 1000 [USP'U]/ML
80 INJECTION, SOLUTION INTRAVENOUS; SUBCUTANEOUS ONCE
Status: COMPLETED | OUTPATIENT
Start: 2022-05-13 | End: 2022-05-13

## 2022-05-13 RX ORDER — ASPIRIN 81 MG/1
81 TABLET ORAL DAILY
Status: DISCONTINUED | OUTPATIENT
Start: 2022-05-13 | End: 2022-05-18

## 2022-05-13 RX ORDER — CLOBETASOL PROPIONATE 0.5 MG/G
CREAM TOPICAL 2 TIMES DAILY
COMMUNITY

## 2022-05-13 RX ORDER — HEPARIN SODIUM 10000 [USP'U]/100ML
5-30 INJECTION, SOLUTION INTRAVENOUS CONTINUOUS
Status: DISCONTINUED | OUTPATIENT
Start: 2022-05-13 | End: 2022-05-17

## 2022-05-13 RX ORDER — IPRATROPIUM BROMIDE AND ALBUTEROL SULFATE 2.5; .5 MG/3ML; MG/3ML
1 SOLUTION RESPIRATORY (INHALATION) EVERY 4 HOURS PRN
Status: DISCONTINUED | OUTPATIENT
Start: 2022-05-13 | End: 2022-05-18 | Stop reason: HOSPADM

## 2022-05-13 RX ORDER — HEPARIN SODIUM 1000 [USP'U]/ML
80 INJECTION, SOLUTION INTRAVENOUS; SUBCUTANEOUS PRN
Status: DISCONTINUED | OUTPATIENT
Start: 2022-05-13 | End: 2022-05-17

## 2022-05-13 RX ORDER — HEPARIN SODIUM 1000 [USP'U]/ML
40 INJECTION, SOLUTION INTRAVENOUS; SUBCUTANEOUS PRN
Status: DISCONTINUED | OUTPATIENT
Start: 2022-05-13 | End: 2022-05-17

## 2022-05-13 RX ORDER — ACETAMINOPHEN 650 MG/1
650 SUPPOSITORY RECTAL EVERY 6 HOURS PRN
Status: DISCONTINUED | OUTPATIENT
Start: 2022-05-13 | End: 2022-05-18 | Stop reason: HOSPADM

## 2022-05-13 RX ORDER — ONDANSETRON 2 MG/ML
4 INJECTION INTRAMUSCULAR; INTRAVENOUS EVERY 6 HOURS PRN
Status: DISCONTINUED | OUTPATIENT
Start: 2022-05-13 | End: 2022-05-18 | Stop reason: HOSPADM

## 2022-05-13 RX ORDER — SODIUM CHLORIDE 9 MG/ML
INJECTION, SOLUTION INTRAVENOUS PRN
Status: DISCONTINUED | OUTPATIENT
Start: 2022-05-13 | End: 2022-05-18 | Stop reason: HOSPADM

## 2022-05-13 RX ORDER — 0.9 % SODIUM CHLORIDE 0.9 %
1000 INTRAVENOUS SOLUTION INTRAVENOUS ONCE
Status: COMPLETED | OUTPATIENT
Start: 2022-05-13 | End: 2022-05-13

## 2022-05-13 RX ADMIN — HEPARIN SODIUM 5000 UNITS: 5000 INJECTION INTRAVENOUS; SUBCUTANEOUS at 15:16

## 2022-05-13 RX ADMIN — HEPARIN SODIUM 5000 UNITS: 5000 INJECTION INTRAVENOUS; SUBCUTANEOUS at 08:15

## 2022-05-13 RX ADMIN — ASPIRIN 81 MG: 81 TABLET, COATED ORAL at 08:16

## 2022-05-13 RX ADMIN — SODIUM CHLORIDE, PRESERVATIVE FREE 5 ML: 5 INJECTION INTRAVENOUS at 22:05

## 2022-05-13 RX ADMIN — CEFEPIME HYDROCHLORIDE 2000 MG: 2 INJECTION, POWDER, FOR SOLUTION INTRAVENOUS at 00:26

## 2022-05-13 RX ADMIN — ACETAMINOPHEN 650 MG: 325 TABLET ORAL at 20:40

## 2022-05-13 RX ADMIN — CEFEPIME HYDROCHLORIDE 2000 MG: 2 INJECTION, POWDER, FOR SOLUTION INTRAVENOUS at 11:49

## 2022-05-13 RX ADMIN — AZITHROMYCIN DIHYDRATE 500 MG: 500 INJECTION, POWDER, LYOPHILIZED, FOR SOLUTION INTRAVENOUS at 01:00

## 2022-05-13 RX ADMIN — SODIUM CHLORIDE: 9 INJECTION, SOLUTION INTRAVENOUS at 15:15

## 2022-05-13 RX ADMIN — SODIUM CHLORIDE 1000 ML: 9 INJECTION, SOLUTION INTRAVENOUS at 00:28

## 2022-05-13 RX ADMIN — SODIUM CHLORIDE, PRESERVATIVE FREE 10 ML: 5 INJECTION INTRAVENOUS at 08:16

## 2022-05-13 RX ADMIN — SODIUM CHLORIDE: 9 INJECTION, SOLUTION INTRAVENOUS at 04:44

## 2022-05-13 RX ADMIN — HEPARIN SODIUM 6870 UNITS: 1000 INJECTION INTRAVENOUS; SUBCUTANEOUS at 21:58

## 2022-05-13 RX ADMIN — Medication 18 UNITS/KG/HR: at 22:00

## 2022-05-13 ASSESSMENT — ENCOUNTER SYMPTOMS
GASTROINTESTINAL NEGATIVE: 1
SHORTNESS OF BREATH: 0

## 2022-05-13 ASSESSMENT — PAIN SCALES - GENERAL
PAINLEVEL_OUTOF10: 0

## 2022-05-13 NOTE — ED NOTES
Clinical Pharmacy Progress Note  Medication History     Admit Date: 5/12    List of of current medications patient is taking is complete. Home Medication list in EPIC updated to reflect changes noted below. Source of information: [address] List    Changes made to medication list:   Medications removed (no longer taking):  · Alprazolam     Medications added:   · Clobetasol cream  · Diclofenac gel    Complete Home Medication List:  Current Outpatient Medications   Medication Instructions    aspirin 81 mg, Oral, DAILY    clobetasol (TEMOVATE) 0.05 % cream Topical, 2 TIMES DAILY, Apply topically 2 times daily Monday-Friday to R Lower Leg Wound     diclofenac sodium (VOLTAREN) 4 g, Topical, 2 TIMES DAILY    Multiple Vitamins-Minerals (MULTI VITAMIN/MINERALS) TABS Take  by mouth. Please call with any questions.   Kathy Nicole, PharmD  Main Pharmacy: [id number]  5/13/2022 12:05 PM

## 2022-05-13 NOTE — ED TRIAGE NOTES
Chest pain that started at noon on left side. Per EMS, blood pressure was elevated and was given nitro.  Pt rates pain 7/10 and states his pain has gotten better since receiving nitro

## 2022-05-13 NOTE — PROGRESS NOTES
Pharmacy Note - Renal Dosing and Extended Infusion Beta-Lactam Adjustment    Cefepime ordered for treatment of HAP. Per [facility] Renal Dose Adjustment Policy and Extended Infusion Beta-Lactam Policy, dosing will be changed to 2g (extended infusion) IV q12h   (30 min load given in ED)     Estimated Creatinine Clearance: Estimated Creatinine Clearance: 30 mL/min (A) (based on SCr of 1.7 mg/dL (H)). Dialysis Status, AJ, CKD: CKD (appears SCr at baseline)  BMI: Body mass index is 28.79 kg/m². Rationale for Adjustment: Agent is renally eliminated and demonstrates time-dependent effect on bacterial eradication. Extended-infusion dosing strategy aims to enhance microbiologic and clinical efficacy. .    Pharmacy will continue to monitor renal function, cultures and sensitivities (where available) and adjust dose as necessary. Please call with questions:    Carolin Baldwin  [facility]    5/13/2022 11:19 AM   [phone] (office)   [phone] (wireless)   [phone] ([address] [place] [address])

## 2022-05-13 NOTE — PROGRESS NOTES
Pt has become increasingly confused since arriving to 6323 from the ED. Pt alert to person only at this time. Pt is looking for clothes to be taken home. Pt up to chair with chair alarm needs frequent reminding not to get up without assistance. [employer] camera in use.

## 2022-05-13 NOTE — CONSULTS
[facility]  Palliative Medicine Consultation Note      Date Of Admission:2022  Date of consult: 22  Seen by [facility] in the past:  No    Recommendations:        Mr. Morgan Mir is alert and oriented to himself, time, and the general situation, was disoriented to place. He is able to carry on a conversation but repeats himself and becomes confused at times. He reports that his wife  several years ago. He lives at [facility], has 4 children, and has a girlfriend who is currently at [facility]. He feels his quality of life is great, although he regrets that he had to stop playing softball last year. He can still dance though, which he really enjoys. I called his son Erika Brar, introduced palliative care and had a voluntary discussion about advance care planning. Erika Brar says he and his brother Jorge Patel are [employer] and he will email the document to me. We discussed code status including potential burdens and risks of CPR. Erika Brar thinks his dad should probably be DNR and will discuss it with his brother Jorge Patel. Erika Brar reports his dad has been \"slowing down\" as far as his mobility. He uses a cane in his apartment and a walker outside. We discussed SNF vs back to his independent living. Will await PT/OT eval.    Addendum: Have not received an email with advance directives from Erika Brar. Called Erika Brar and left a message. Called Jorge Patel and it went straight to voicemail again. Will f/u Monday. 1. Goals of Care/Advanced Care planning/Code status: Full Code, discussed with family today. Pt hopes to maintain his quality of life at his independent living facility. 2. Pain: pt c/o left sided pleuritic chest pain. CXR showed left lower lobe consolidation and he's been started on azithromycin and cefepime for HCAP. 3. SOB: denies sob and is breathing comfortably on room air. 4. Generalized weakness: ordered PT/OT eval. Pt uses a cane at his independent living apartment and a walker when going outside his apartment.  Son is open to him returning home or SNF if recommended. 5. Disposition: TBD    Reason for Consult:         [x]  Goals of Care  [x]  Code Status Discussion/Advanced Care Planning   []  Psychosocial/Family Support  []  Symptom Management  []  Other (Specify)    Requesting Physician: Dr. Tamara Hernandez:  Left-sided chest pain pleuritic in nature    History Obtained From:  family member - son Gregor Myers, pt, electronic medical record    History of Present Illness:         Jaron Wyatt is a 80 y.o. male with PMH of anxiety, arthritis, prostate cancer, cognitive impairment who presented with left-sided chest pain pleuritic in nature. His son reported some confusion over the past 2 days along with a nonproductive mild cough. He's being admitted with health care associated pneumonia with left lower lobe consolidation on chest x-ray, and AJ. Subjective:         Past Medical History:        Diagnosis Date    Anxiety     Arthritis     Cancer ([address]     prostate    Cancer ([facility] [address]     skin    History of blood clots     Hx of blood clots     MRSA infection 3/13/14    leg wound       Past Surgical History:        Procedure Laterality Date    JOINT REPLACEMENT Right 2005    hip    KIDNEY SURGERY  2019    PROSTATE SURGERY         Current Medications:    Not in a hospital admission. Allergies:  Sulfamethoxazole-trimethoprim    Social History:    · TOBACCO: reports that he quit smoking about 42 years ago. He has never used smokeless tobacco.  · ETOH:   reports current alcohol use. · Patient currently lives at [facility] - obtained from pt and son  Review of Systems   Constitutional: Positive for activity change. HENT: Negative. Respiratory: Negative for shortness of breath. Cardiovascular: Positive for chest pain. Gastrointestinal: Negative. Genitourinary: Negative. Musculoskeletal: Negative. Skin: Negative. Neurological: Negative.     Psychiatric/Behavioral: Positive for confusion.   :     Objective:        Physical Exam  Constitutional:       General: He is not in acute distress. HENT:      Head: Normocephalic and atraumatic. Cardiovascular:      Rate and Rhythm: Normal rate and regular rhythm. Pulmonary:      Effort: Pulmonary effort is normal.      Breath sounds: Normal breath sounds. Abdominal:      General: Bowel sounds are normal.      Palpations: Abdomen is soft. Musculoskeletal:         General: No swelling. Skin:     General: Skin is warm and dry. Neurological:      Mental Status: He is alert. Comments: Oriented to self, month/year, and general situation, disoriented to place          Palliative Performance Scale:  [] 60% Ambulation reduced; Significant disease; Can't do hobbies/housework; intake normal or reduced; occasional assist; LOC full/confusion  [] 50% Mainly sit/lie; Extensive disease; Can't do any work; Considerable assist; intake normal  Or reduced; LOC full/confusion  [] 40% Mainly in bed; Extensive disease; Mainly assist; intake normal or reduced; occasional assist; LOC full/confusion  [] 30% Bed Bound; Extensive disease; Total care; intake reduced; LOC full/confusion  [] 20% Bed Bound; Extensive disease; Total care; intake minimal; Drowsy/coma  [] 10% Bed Bound; Extensive disease; Total care; Mouth care only; Drowsy/coma  [] 0% Death    PPS: 70%    Vitals:    BP (!) 149/75   Pulse 67   Temp 98.6 °F (37 °C) (Oral)   Resp 18   SpO2 96%     Labs:    BMP:   Recent Labs     05/12/22 2334 05/13/22 0447    138   K 4.0 4.3    107   CO2 22 25   BUN 26* 24*   CREATININE 2.0* 1.7*   GLUCOSE 127* 121*     CBC:   Recent Labs     05/12/22 2334   WBC 7.1   HGB 13.3*   HCT 39.5*   *       LFT's: No results for input(s): AST, ALT, ALB, BILITOT, ALKPHOS in the last 72 hours. Troponin:   Recent Labs     05/12/22 2334 05/13/22 0447   TROPONINI <0.01 <0.01     BNP: No results for input(s): BNP in the last 72 hours.   ABGs: No results for input(s): PHART, EQK2XLQ, PO2ART in the last 72 hours. INR: No results for input(s): INR in the last 72 hours. U/A:No results for input(s): NITRITE, COLORU, PHUR, LABCAST, WBCUA, RBCUA, MUCUS, TRICHOMONAS, YEAST, BACTERIA, CLARITYU, SPECGRAV, LEUKOCYTESUR, UROBILINOGEN, BILIRUBINUR, BLOODU, GLUCOSEU, AMORPHOUS in the last 72 hours. Invalid input(s): KETONESU    XR CHEST (2 VW)   Final Result      Left lower lobe consolidation likely representing pneumonia. CT CHEST WO CONTRAST    (Results Pending)         Conclusion/Time spent:         Recommendations see above    Pt [id number], Family [phone]  Time spent with patient and/or family: 28  Time reviewing records: 10 min   Time communicating with staff: 5 min     A total of 50 minutes spent with the patient and family on unit greater than 50% in counseling regarding palliative care and in goals of care for the patient. Thank you to Dr. Otf Allen for this consultation. We will continue to follow Mr. Smita García care as needed.     Harvinder French NP  [id number] [employer]

## 2022-05-13 NOTE — PROGRESS NOTES
Hospitalist Progress Note      PCP: Hollie Vogel MD    Date of Admission: 2022    Chief Complaint on Admission: left sided chest pain    Pt Seen/Examined and Chart Reviewed. Admitting dx pneumonia    SUBJECTIVE. OBJECTIVE:     Patient is very hard of hearing. Reports left sided chest wall pain with movements, overall appears comfortable. Denies short ness of breath. Allergies  Sulfamethoxazole-trimethoprim    Medications      Scheduled Meds:   aspirin  81 mg Oral Daily    sodium chloride flush  5-40 mL IntraVENous 2 times per day    heparin (porcine)  5,000 Units SubCUTAneous 3 times per day    [START ON 2022] azithromycin  500 mg IntraVENous Q24H    cefepime  2,000 mg IntraVENous Q12H       Infusions:   sodium chloride      sodium chloride 100 mL/hr at 22 1515       PRN Meds:  sodium chloride flush, sodium chloride, ondansetron **OR** ondansetron, polyethylene glycol, acetaminophen **OR** acetaminophen, ipratropium-albuterol    Vitals    TEMPERATURE:  Current - Temp: 98.6 °F (37 °C); Max - Temp  Av.6 °F (37 °C)  Min: 98.5 °F (36.9 °C)  Max: 98.6 °F (37 °C)  RESPIRATIONS RANGE: Resp  Av.9  Min: 15  Max: 18  PULSE RANGE: Pulse  Av.3  Min: 66  Max: 84  BLOOD PRESSURE RANGE:  Systolic (42THD), OSY:428 , Min:116 , XWV:312   ; Diastolic (94LSW), FXC:12, Min:63, Max:89    PULSE OXIMETRY RANGE: SpO2  Av.2 %  Min: 93 %  Max: 99 %  24HR INTAKE/OUTPUT:      Intake/Output Summary (Last 24 hours) at 2022 1539  Last data filed at 2022 1326  Gross per 24 hour   Intake 2020 ml   Output 1100 ml   Net 920 ml       Exam:      General appearance: No apparent distress, appears stated age and cooperative.   Lungs: diminished breath sounds  Heart: Regular rate and rhythm with Normal S1/S2 without  murmurs, rubs or gallops, point of maximum impulse non-displaced  Abdomen: Soft, non-tender or non-distended without rigidity or guarding and positive bowel sounds all four quadrants. Extremities: No clubbing, cyanosis, 1+ pitting leg edema bilaterally. Skin: LE venous stasis dermatitis  Neurologic: Alert and oriented X 3,   grossly non-focal.  Mental status: Alert, oriented, thought content appropriate. Data    Recent Labs     05/12/22 2334   WBC 7.1   HGB 13.3*   HCT 39.5*   *      Recent Labs     05/12/22 2334 05/13/22 0447    138   K 4.0 4.3    107   CO2 22 25   BUN 26* 24*   CREATININE 2.0* 1.7*     No results for input(s): AST, ALT, ALB, BILIDIR, BILITOT, ALKPHOS in the last 72 hours. No results for input(s): INR in the last 72 hours. Recent Labs     05/12/22 2334 05/13/22 0447   TROPONINI <0.01 <0.01       Consults:     IP CONSULT TO HOSPITALIST  IP CONSULT TO [facility] Problems    Diagnosis Date Noted    HCAP (healthcare-associated pneumonia) [J18.9] 05/13/2022     Priority: Medium         ASSESSMENT AND PLAN      #Healthcare associated pneumonia, chest x-ray with left lower lobe consolidation:  Will obtain non con CT chest as patient not having infectious symtoms  Cont with serial trops, add Ddimer, venous doppler, procal, MRSA nares  Cont with cefepime, azithromycin    #AJ:  Improving. Cont with more IVF and rule out retention with bladder scan    #Left-sided chest pain, relieved with nitroglycerin per patient:  Trop has been negative and EKG non ischemic  Need to rule out PE    #History of prostate cancer  Monitor for urinary retention    DVT Prophylaxis: heparin  Diet: ADULT DIET;  Regular  Code Status: Full Code    PT/OT Gladys Izquierdo MD

## 2022-05-13 NOTE — ED PROVIDER NOTES
[address] [facility]          ATTENDING PHYSICIAN NOTE       Date of evaluation: 5/12/2022    Chief Complaint     Chest Pain (left sided chest pain that started a few hours ago)      History of Present Illness     Luan Patel is a 80 y.o. male who presents with complaints of left-sided upper chest pain that began around noon today. Overall the patient is a poor historian and has a hard time describing the symptoms. He notes it is an 8 out of 10 but seem to get better when EMS gave him a nitroglycerin. The pain is described as sharp but not worse with deep inspiration. He has no associated nausea, vomiting, cough, diaphoresis, or shortness of breath. Review of Systems     Review of Systems   Constitutional: Negative for chills, diaphoresis and fever. Respiratory: Negative for cough and shortness of breath. Cardiovascular: Positive for chest pain. Negative for palpitations. Gastrointestinal: Negative for abdominal pain, nausea and vomiting. All other systems reviewed and are negative. Past Medical, Surgical, Family, and Social History     He has a past medical history of Anxiety, Arthritis, Cancer ([address] Cancer ([address] History of blood clots, Hx of blood clots, and MRSA infection. He has a past surgical history that includes Prostate surgery; joint replacement (Right, 2005); and Kidney surgery (2019). His family history is not on file. He reports that he quit smoking about 42 years ago. He has never used smokeless tobacco. He reports current alcohol use. He reports that he does not use drugs. Medications     Previous Medications    ALPRAZOLAM (XANAX) 0.25 MG TABLET    Take 0.25 mg by mouth nightly as needed for Sleep    ASPIRIN 81 MG TABLET    Take 81 mg by mouth daily    MULTIPLE VITAMINS-MINERALS (MULTI VITAMIN/MINERALS) TABS    Take  by mouth. Allergies     He is allergic to sulfamethoxazole-trimethoprim.     Physical Exam     INITIAL VITALS: BP: 130/71, Temp: 98.5 °F (36.9 °C), Pulse: 84, Resp: 17, SpO2: 94 %   Physical Exam  Vitals and nursing note reviewed. Constitutional:       General: He is not in acute distress. Appearance: He is well-developed. He is not diaphoretic. Cardiovascular:      Rate and Rhythm: Normal rate and regular rhythm. Heart sounds: Normal heart sounds. Pulmonary:      Effort: Pulmonary effort is normal.      Breath sounds: Normal breath sounds. Chest:      Chest wall: No tenderness. Abdominal:      General: Bowel sounds are normal. There is no distension. Palpations: Abdomen is soft. Tenderness: There is no abdominal tenderness. Skin:     General: Skin is warm and dry. Neurological:      Mental Status: He is alert and oriented to person, place, and time. Psychiatric:         Behavior: Behavior normal.         Diagnostic Results     EKG   Interpreted by Lawrence Martin MD     Rhythm: normal sinus   Rate: normal  Axis: normal  Ectopy: none  Conduction: normal  ST Segments: no acute change  T Waves: no acute change  Q Waves: none    Clinical Impression: normal sinus rhythm with no acute changes/normal EKG    RADIOLOGY:  XR CHEST (2 VW)   Final Result      Left lower lobe consolidation likely representing pneumonia.              LABS:   Results for orders placed or performed during the hospital encounter of 96/62/64   Basic Metabolic Panel w/ Reflex to MG   Result Value Ref Range    Sodium 137 136 - 145 mmol/L    Potassium reflex Magnesium 4.0 3.5 - 5.1 mmol/L    Chloride 102 99 - 110 mmol/L    CO2 22 21 - 32 mmol/L    Anion Gap 13 3 - 16    Glucose 127 (H) 70 - 99 mg/dL    BUN 26 (H) 7 - 20 mg/dL    CREATININE 2.0 (H) 0.8 - 1.3 mg/dL    GFR Non- 31 (A) >60    GFR  38 (A) >60    Calcium 9.4 8.3 - 10.6 mg/dL   CBC with Auto Differential   Result Value Ref Range    WBC 7.1 4.0 - 11.0 K/uL    RBC 4.14 (L) 4.20 - 5.90 M/uL    Hemoglobin 13.3 (L) 13.5 - 17.5 g/dL Hematocrit 39.5 (L) 40.5 - 52.5 %    MCV 95.6 80.0 - 100.0 fL    MCH 32.1 26.0 - 34.0 pg    MCHC 33.6 31.0 - 36.0 g/dL    RDW 14.1 12.4 - 15.4 %    Platelets 670 (L) 353 - 450 K/uL    MPV 7.7 5.0 - 10.5 fL    Neutrophils % 67.6 %    Lymphocytes % 21.2 %    Monocytes % 9.9 %    Eosinophils % 0.9 %    Basophils % 0.4 %    Neutrophils Absolute 4.8 1.7 - 7.7 K/uL    Lymphocytes Absolute 1.5 1.0 - 5.1 K/uL    Monocytes Absolute 0.7 0.0 - 1.3 K/uL    Eosinophils Absolute 0.1 0.0 - 0.6 K/uL    Basophils Absolute 0.0 0.0 - 0.2 K/uL   Blood Gas, Venous   Result Value Ref Range    pH, Lauri 7.426 7.350 - 7.450    pCO2, Lauri 37.5 (L) 41.0 - 51.0 mmHg    pO2, Lauri 43.2 (H) 25.0 - 40.0 mmHg    HCO3, Venous 24.7 24.0 - 28.0 mmol/L    Base Excess, Lauri 0.5 -2.0 - 3.0 mmol/L    O2 Sat, Lauri 82 Not established %    Carboxyhemoglobin 1.4 0.0 - 1.5 %    MetHgb, Lauri 0.1 0.0 - 1.5 %    TC02 (Calc), Lauri 26 mmol/L    Hemoglobin, Lauri, Reduced 18.10 %   Troponin   Result Value Ref Range    Troponin <0.01 <0.01 ng/mL   Brain Natriuretic Peptide   Result Value Ref Range    Pro- (H) 0 - 449 pg/mL       RECENT VITALS:  BP: 130/71,Temp: 98.5 °F (36.9 °C), Pulse: 84, Resp: 17, SpO2: 94 %       ED Course     Nursing Notes, Past Medical Hx, Past Surgical Hx, Social Hx,Allergies, and Family Hx were reviewed. patient was given the following medications:  Orders Placed This Encounter   Medications    0.9 % sodium chloride bolus    cefepime (MAXIPIME) 2000 mg IVPB minibag     Order Specific Question:   Antimicrobial Indications     Answer:   Pneumonia (HAP)    azithromycin (ZITHROMAX) 500 mg in dextrose 5 % 250 mL IVPB     Order Specific Question:   Antimicrobial Indications     Answer:   Pneumonia (HAP)       CONSULTS:  IP CONSULT TO HOSPITALIST    MEDICAL DECISIONMAKING / ASSESSMENT / Mandy Niecy is a 80 y.o. male presenting from assisted living with left-sided chest pain that is been present since this afternoon.   The patient's pain seems to be pleuritic in the way he describes it. He is not really describing shortness of breath or significant cough but overall is still a poor historian and is very hard of hearing. EKG is normal and initial troponin is negative but x-ray did reveal a left lower lobe infiltrate consistent with pneumonia. He has a normal white blood cell count but also was noted to have acute kidney injury with a new creatinine of 2.0. The patient's port score is 132 and curb 65 is 2, which both come with recommendations for admission. The patient was started on IV fluids and IV antibiotics and hospitalist was contacted for admission. Clinical Impression     1. Pneumonia of left lower lobe due to infectious organism    2. Chest pain, unspecified type    3.  AJ (acute kidney injury) [facility])        Disposition     DISPOSITION Decision To Admit 05/13/2022 12:48:42 AM       Vasiliy Vieyra MD  05/13/22 [id number]

## 2022-05-13 NOTE — H&P
Hospital Medicine History & Physical      PCP: Maria Shelton MD    Date of Admission: 5/12/2022    Date of Service: Pt seen/examined on 5/13/2022 and Admitted to Inpatient with expected LOS greater than two midnights due to medical therapy. Chief Complaint: Left-sided chest pain pleuritic in nature      History Of Present Illness:      80 y.o. male who presents from his assisted living facility with complaints of left-sided chest pain. Pain started around 12 noon yesterday and located in left upper chest with no radiation. Pain gets worse with deep breathing and cough. Patient is unable to describe his symptoms fully. States that pain got better when EMS gave him sublingual nitroglycerin. Denies associated shortness of breath, nausea, vomiting, diaphoresis. Patient is a poor historian given some cognitive impairment at baseline as well as hearing impairment. According to the patient's son he has been somewhat confused over the past 2 days and is having a nonproductive mild cough. Past Medical History:          Diagnosis Date    Anxiety     Arthritis     [facility] [address] [address]     prostate    Cancer ([facility] [address]     skin    History of blood clots     Hx of blood clots     MRSA infection 3/13/14    leg wound       Past Surgical History:          Procedure Laterality Date    JOINT REPLACEMENT Right 2005    hip    KIDNEY SURGERY  2019    PROSTATE SURGERY         Medications Prior to Admission:      Prior to Admission medications    Medication Sig Start Date End Date Taking? Authorizing Provider   aspirin 81 MG tablet Take 81 mg by mouth daily    Historical Provider, MD   ALPRAZolam (XANAX) 0.25 MG tablet Take 0.25 mg by mouth nightly as needed for Sleep    Historical Provider, MD   Multiple Vitamins-Minerals (MULTI VITAMIN/MINERALS) TABS Take  by mouth.     Historical Provider, MD       Allergies:  Sulfamethoxazole-trimethoprim    Social History:      TOBACCO:   reports that he quit smoking about 43 years ago. He has never used smokeless tobacco.  ETOH:   reports current alcohol use. E-Cigarettes/Vaping Use     Questions Responses    E-Cigarette/Vaping Use     Start Date     Passive Exposure     Quit Date     Counseling Given     Comments           Family History:    Reviewed in detail and negative for DM, CAD, Cancer, CVA. REVIEW OF SYSTEMS COMPLETED:   Pertinent positives as noted in the HPI. All other systems reviewed and negative. History is limited due to hearing and cognitive impairment at baseline    PHYSICAL EXAM PERFORMED:    /63   Pulse 70   Temp 98.5 °F (36.9 °C) (Oral)   Resp 17   SpO2 95%     General appearance:  No apparent distress, appears stated age and cooperative. Very hard of hearing. Not on supplemental oxygen  HEENT:  Normal cephalic, atraumatic without obvious deformity. Pupils equal, round, and reactive to light. Extra ocular muscles intact. Conjunctivae/corneas clear. Neck: Supple, with full range of motion. No jugular venous distention. Trachea midline. Respiratory:  Normal respiratory effort. Diminished breath sounds in left base cardiovascular:  Regular rate and rhythm with normal S1/S2 without murmurs, rubs or gallops. Abdomen: Soft, non-tender, non-distended with normal bowel sounds. Musculoskeletal:  No clubbing, cyanosis or edema bilaterally. Full range of motion without deformity. Skin: Skin color, texture, turgor normal.  No rashes or lesions. Neurologic:  Neurovascularly intact without any focal sensory/motor deficits.  Cranial nerves: II-XII intact, grossly non-focal.  Psychiatric:  Alert and oriented  Capillary Refill: Brisk,3 seconds, normal  Peripheral Pulses: +2 palpable, equal bilaterally       Labs:     Recent Labs     05/12/22  2334   WBC 7.1   HGB 13.3*   HCT 39.5*   *     Recent Labs     05/12/22  2334      K 4.0      CO2 22   BUN 26*   CREATININE 2.0*   CALCIUM 9.4     No results for input(s): AST, ALT, BILIDIR, BILITOT, ALKPHOS in the last 72 hours. No results for input(s): INR in the last 72 hours. Recent Labs     05/12/22  2334   TROPONINI <0.01       Urinalysis:    No results found for: Leanord Delfino, BACTERIA, RBCUA, BLOODU, [address], GLUCOSEU    Radiology:   EKG:  I have reviewed the EKG with the following interpretation: Normal sinus rhythm, normal rate and intervals, no acute ST-T changes    XR CHEST (2 VW)   Final Result      Left lower lobe consolidation likely representing pneumonia. ASSESSMENT:    Active Hospital Problems    Diagnosis Date Noted    HCAP (healthcare-associated pneumonia) [J18.9] 05/13/2022     Priority: Medium   #Healthcare associated pneumonia, chest x-ray with left lower lobe consolidation. Its not very clear patient's symptoms due to his confusion/cognitive impairment at baseline  #AJ  #Left-sided chest pain, relieved with nitroglycerin per patient  #History of prostate cancer    PLAN:  -Continue on current IV antibiotics  -Bronchodilator therapy as needed  -Gentle IV hydration  -SLP evaluation  -Monitor renal function and electrolytes  -Trend cardiac enzymes, repeat EKG in a.m.  -Pain relief as needed  -Supportive therapy      DVT Prophylaxis: Lovenox  Diet: ADULT DIET; Regular  Code Status: Full Code    PT/OT Eval Status: As tolerated with fall precautions    Dispo -GMF with telemetry       Maria Montenegro MD    Thank you Corey Farley MD for the opportunity to be involved in this patient's care. If you have any questions or concerns please feel free to contact me at [phone].

## 2022-05-13 NOTE — PROGRESS NOTES
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  ONSET DATE:  22    Recent CT Chest: (22)  Impression       1. Evidence of chronic left lower lobe airspace disease with subpulmonic small effusion and basilar atelectasis residing in the same location of previous lower lobe airspace disease in the anterobasal segment, lateral basal segment posterior basal    segment. 2. Chronic lung disease in the periphery of the lingula which is slightly decreased from 2015   3. Stable subpleural benign appearing nodule anterior right middle lobe. Recent CXR: (22)  Impression       Left lower lobe consolidation likely representing pneumonia. Date of Eval: 2022  Evaluating Therapist: CARMEN Mabry    Current Diet level:  Current Diet : Regular Adult    Primary Complaint  Patient Complaint: denied    Pain:  Pain Assessment  Pain Assessment: 0-10  Pain Level: 0  Patient's Stated Pain Goal: 0 - No pain  Pain Location: Chest  Pain Orientation: Left  Pain Frequency: Continuous    Reason for Referral  Isa Mcdermott was referred for a bedside swallow evaluation to assess the efficiency of his swallow function, identify signs and symptoms of aspiration and make recommendations regarding safe dietary consistencies, effective compensatory strategies, and safe eating environment. Impression  Dysphagia Diagnosis: Swallow function appears WFL  Dysphagia Impression:  Oropharyngeal swallow appears intact.   Pt demonstrates adequate labial seal, adequate mastication, and no significant oral residue. Laryngeal elevation appears adequate. Pt demonstrates dry cough 1/4 trials of thin liquid; no clinical s/s aspiration following sequential swallows for 3oz thin liquids via straw. Vocal quality remains clear throughout. Recommend continue Regular texture diet with Thin Liquids. No f/u ST indicated. Dysphagia Outcome Severity Scale: Level 6: Within functional limits/Modified independence     Treatment Plan  Requires SLP Intervention: No  Duration of Treatment: n/a  D/C Recommendations: No follow up therapy recommended post discharge       Recommended Diet and Intervention  Regular Texture with Thin Liquids  Recommended Form of Meds: PO    Compensatory Swallowing Strategies  n/a    Treatment/Goals  n/a    General  Chart Reviewed: Yes  Behavior/Cognition: Alert; Cooperative (very [place])  Temperature Spikes Noted: No (per chart)  Respiratory Status: Room air  O2 Device: None (Room air)  Communication Observation: Functional  Dentition: Adequate  Patient Positioning: Upright in bed  Baseline Vocal Quality: Normal  Volitional Cough: Strong  Prior Dysphagia History: none noted in this EMR  Consistencies Administered: Thin - cup; Thin - straw;Pureed;Regular    Vision/Hearing  Vision  Vision: Within Functional Limits  Hearing  Hearing: Exceptions to [facility]  Hearing Exceptions: Hard of hearing/hearing concerns    Oral Motor Deficits  Oral/Motor  Oral Hygiene: Moist;Clean    Oral Phase Dysfunction  Oral Phase  Oral Phase: WFL  Oral Phase  Oral Phase - Comment: WFL     Indicators of Pharyngeal Phase Dysfunction  Pharyngeal Phase   Pharyngeal Phase: Laryngeal elevation appears adequate. Pt demonstrates dry cough 1/4 trials of thin liquid; no clinical s/s aspiration following sequential swallows for 3oz thin liquids via straw. Vocal quality remains clear throughout.       Prognosis  Individuals consulted  Consulted and agree with results and recommendations: Patient    Education  Patient Education: SLP educated pt re: role of SLP, anatomy and physiology of swallow, s/s aspiration to report, risks of aspiration, and diet/POC recommendations. Patient Education Response: Verbalizes understanding  Safety Devices in place: Yes  Type of devices: All fall risk precautions in place       Therapy Time  30 minutes      Plan  Diet Recommendations:    Diet Solids Recommendation: Regular   Liquid Consistency Recommendation: Thin  Discharge Plan: no further ST indicated at this time  Discussed with RN. Needs within reach. Electronically Signed by:  Sherry Beatty., [address] [address]  Pager #[phone]  This document will serve as a discharge summary if pt discharges before next treatment.    5/13/2022 2:03 PM

## 2022-05-14 LAB
ANION GAP SERPL CALCULATED.3IONS-SCNC: 8 MMOL/L (ref 3–16)
ANTI-XA UNFRAC HEPARIN: 0.36 IU/ML (ref 0.3–0.7)
ANTI-XA UNFRAC HEPARIN: 0.84 IU/ML (ref 0.3–0.7)
ANTI-XA UNFRAC HEPARIN: <0.04 IU/ML (ref 0.3–0.7)
BUN BLDV-MCNC: 22 MG/DL (ref 7–20)
CALCIUM SERPL-MCNC: 9.2 MG/DL (ref 8.3–10.6)
CHLORIDE BLD-SCNC: 106 MMOL/L (ref 99–110)
CO2: 25 MMOL/L (ref 21–32)
CREAT SERPL-MCNC: 1.7 MG/DL (ref 0.8–1.3)
GFR AFRICAN AMERICAN: 46
GFR NON-AFRICAN AMERICAN: 38
GLUCOSE BLD-MCNC: 118 MG/DL (ref 70–99)
MRSA SCREEN RT-PCR: NORMAL
POTASSIUM REFLEX MAGNESIUM: 4.2 MMOL/L (ref 3.5–5.1)
SODIUM BLD-SCNC: 139 MMOL/L (ref 136–145)

## 2022-05-14 PROCEDURE — 80048 BASIC METABOLIC PNL TOTAL CA: CPT

## 2022-05-14 PROCEDURE — 85520 HEPARIN ASSAY: CPT

## 2022-05-14 PROCEDURE — 2580000003 HC RX 258: Performed by: INTERNAL MEDICINE

## 2022-05-14 PROCEDURE — 1200000000 HC SEMI PRIVATE

## 2022-05-14 PROCEDURE — 6360000002 HC RX W HCPCS: Performed by: INTERNAL MEDICINE

## 2022-05-14 PROCEDURE — 36415 COLL VENOUS BLD VENIPUNCTURE: CPT

## 2022-05-14 PROCEDURE — 99223 1ST HOSP IP/OBS HIGH 75: CPT | Performed by: HOSPITALIST

## 2022-05-14 PROCEDURE — 6370000000 HC RX 637 (ALT 250 FOR IP): Performed by: INTERNAL MEDICINE

## 2022-05-14 RX ADMIN — ASPIRIN 81 MG: 81 TABLET, COATED ORAL at 09:57

## 2022-05-14 RX ADMIN — CEFEPIME HYDROCHLORIDE 2000 MG: 2 INJECTION, POWDER, FOR SOLUTION INTRAVENOUS at 00:55

## 2022-05-14 RX ADMIN — Medication 16 UNITS/KG/HR: at 17:51

## 2022-05-14 RX ADMIN — ACETAMINOPHEN 650 MG: 325 TABLET ORAL at 13:27

## 2022-05-14 RX ADMIN — SODIUM CHLORIDE, PRESERVATIVE FREE 10 ML: 5 INJECTION INTRAVENOUS at 22:03

## 2022-05-14 RX ADMIN — AZITHROMYCIN 500 MG: 500 INJECTION, POWDER, LYOPHILIZED, FOR SOLUTION INTRAVENOUS at 04:33

## 2022-05-14 RX ADMIN — CEFEPIME HYDROCHLORIDE 2000 MG: 2 INJECTION, POWDER, FOR SOLUTION INTRAVENOUS at 13:15

## 2022-05-14 RX ADMIN — Medication 16 UNITS/KG/HR: at 09:57

## 2022-05-14 ASSESSMENT — PAIN DESCRIPTION - ORIENTATION: ORIENTATION: LEFT

## 2022-05-14 ASSESSMENT — PAIN SCALES - GENERAL: PAINLEVEL_OUTOF10: 3

## 2022-05-14 ASSESSMENT — PAIN DESCRIPTION - DESCRIPTORS: DESCRIPTORS: DISCOMFORT;SHARP

## 2022-05-14 ASSESSMENT — PAIN DESCRIPTION - FREQUENCY: FREQUENCY: INTERMITTENT

## 2022-05-14 ASSESSMENT — PAIN DESCRIPTION - LOCATION: LOCATION: CHEST

## 2022-05-14 ASSESSMENT — PAIN DESCRIPTION - PAIN TYPE: TYPE: ACUTE PAIN

## 2022-05-14 NOTE — PLAN OF CARE
Problem: Safety - Adult  Goal: Free from fall injury  5/14/2022 1012 by Ramy Abel RN  Outcome: Progressing  Pt is confused and gets out of bed without calling for help. Avasys camera is in room.

## 2022-05-14 NOTE — CARE COORDINATION
Case Management Assessment           Initial Evaluation                Date / Time of Evaluation: 5/14/2022 3:16 PM                 Assessment Completed by: MARGI Carvalho, [place]    Patient Name: Elizabeth Cole     YOB: 1930  Diagnosis: AJ (acute kidney injury) ([facility] [address] [N17.9]  HCAP (healthcare-associated pneumonia) [J18.9]  Pneumonia of left lower lobe due to infectious organism [J18.9]  Chest pain, unspecified type [R07.9]     Date / Time: 5/12/2022 11:09 PM    Patient Admission Status: Inpatient    If patient is discharged prior to next notation, then this note serves as note for discharge by case management. Current PCP: Jeremiah Cabello MD  Clinic Patient: No    Chart Reviewed: Yes  Patient/ Family Interviewed: Yes    Initial assessment completed at bedside with: patient    Hospitalization in the last 30 days: No    Emergency Contacts:  Extended Emergency Contact Information  Primary Emergency Contact: Radha Null [address]  Mobile Phone: [phone]  Relation: Child  Secondary Emergency Contact: Babar Gonzalez  Home Phone: [phone]  Relation: Child    Advance Directives:   Code Status: Full Code    Healthcare Power of : Yes  Agent: pt's Yvonne kurtz [place]  Contact Number: [phone] & [phone]    Financial  Payor: Alfa Harris / Plan: [place] / Product Type: *No Product type* /     Pre-cert required for SNF: Yes    Pharmacy  No Pharmacies Listed    Potential assistance Purchasing Medications: Potential Assistance Purchasing Medications: No  Does Patient want to participate in local refill/ meds to beds program?:      Meds To Beds General Rules:  1. Can ONLY be done Monday- Friday between 8:30am-5pm  2. Prescription(s) must be in pharmacy by 3pm to be filled same day  3. Copy of patient's insurance/ prescription drug card and patient face sheet must be sent along with the prescription(s)  4. Cost of Rx cannot be added to hospital bill.  If financial assistance is needed, please contact unit  or ;  or  CANNOT provide pharmacy voucher for patients co-pays  5. Patients can then  the prescription on their way out of the hospital at discharge, or pharmacy can deliver to the bedside if staff is available. (payment due at time of pick-up or delivery - cash, check, or card accepted)     Able to afford home medications/ co-pay costs: Yes    ADLS  Support Systems: Children    PT AM-PAC:   /24  OT AM-PAC:   /24    [facility]  Steps: none    Plans to RETURN to current housing: Yes  Barriers to RETURNING to current housing: decreased functional mobility    [address]  Currently ACTIVE with [address]: Yes  [address]:  [facility]     Phone: [phone]     Fax: [phone]    Durable Medical Equipment  Equipment: cane and walker    Home Oxygen and [address] prior to admission: No    Dialysis  Active with HD/PD prior to admission: No      DISCHARGE PLAN:  Disposition: Return to [place] vs SNF    Transportation PLAN for discharge:  TBD    Factors facilitating achievement of predicted outcomes: Family support and Pleasant    Barriers to discharge: Lower extremity weakness and Medical complications    Additional Case Management Notes:  [facility] Plan is to return to [place] or go to SNF, depending on OT/OT evals. CM spoke with Gladis Garcia at [facility] [phone], they are already following. CM will continue to follow for DC needs and recs.         The Plan for Transition of Care is related to the following treatment goals of AJ (acute kidney injury) ([facility] [address] [N17.9]  HCAP (healthcare-associated pneumonia) [J18.9]  Pneumonia of left lower lobe due to infectious organism [J18.9]  Chest pain, unspecified type [R07.9]    The Patient and/or patient representative Rickie Lianres and his family were provided with a choice of provider and agrees with the discharge plan Yes    Freedom of choice list was provided with basic dialogue that supports the patient's individualized plan of care/goals and shares the quality data associated with the providers.  Yes    Care Transition patient: No    MARGI Hanna, [facility]  Case Management Department  Ph: [phone]   Fax: [phone]

## 2022-05-14 NOTE — PLAN OF CARE
Problem: Safety - Adult  Goal: Free from fall injury  Outcome: Not Progressing   Pt impulsive, gets out of bed with out calling for assistance. Ambulating with walker, gait unsteady. Voiding on bathroom floor, just short of toilet. Assisted back to bed. . gown changed. Floor cleaned. Bed alarm and Avasys in place. Pt remains free from falls at this time.

## 2022-05-14 NOTE — PROGRESS NOTES
Hospitalist Progress Note      PCP: Hollie Vogel MD    Date of Admission: 2022    Chief Complaint on Admission: left sided chest pain    Pt Seen/Examined and Chart Reviewed. Admitting dx pneumonia    SUBJECTIVE. OBJECTIVE:     She is admitted from assisted living with chest pain. His troponins have been negative serially. Found to have DVT in lower extremities, elevated D-dimer, started on heparin drip, antibiotics for possible pneumonia. Seen with granddaughter at bedside, he still reports some chest discomfort with deep breathing, although appears more comfortable than yesterday. Allergies  Sulfamethoxazole-trimethoprim    Medications      Scheduled Meds:   aspirin  81 mg Oral Daily    sodium chloride flush  5-40 mL IntraVENous 2 times per day    azithromycin  500 mg IntraVENous Q24H    cefepime  2,000 mg IntraVENous Q12H       Infusions:   sodium chloride      heparin (PORCINE) Infusion 16 Units/kg/hr (22 0957)       PRN Meds:  sodium chloride flush, sodium chloride, ondansetron **OR** ondansetron, polyethylene glycol, acetaminophen **OR** acetaminophen, ipratropium-albuterol, heparin (porcine), heparin (porcine)    Vitals    TEMPERATURE:  Current - Temp: 98.2 °F (36.8 °C); Max - Temp  Av.8 °F (36.6 °C)  Min: 97.5 °F (36.4 °C)  Max: 98.2 °F (36.8 °C)  RESPIRATIONS RANGE: Resp  Av  Min: 18  Max: 20  PULSE RANGE: Pulse  Av  Min: 68  Max: 94  BLOOD PRESSURE RANGE:  Systolic (86SKS), IEM:187 , Min:142 , ILK:281   ; Diastolic (10RTN), MQW:95, Min:77, Max:84    PULSE OXIMETRY RANGE: SpO2  Av.5 %  Min: 96 %  Max: 97 %  24HR INTAKE/OUTPUT:      Intake/Output Summary (Last 24 hours) at 2022 1656  Last data filed at 2022 1315  Gross per 24 hour   Intake 1673.79 ml   Output 300 ml   Net 1373.79 ml       Exam:      General appearance: No apparent distress, appears stated age and cooperative.   Lungs: diminished breath sounds  Heart: Regular rate and rhythm with Normal S1/S2 without  murmurs, rubs or gallops, point of maximum impulse non-displaced  Abdomen: Soft, non-tender or non-distended without rigidity or guarding and positive bowel sounds all four quadrants. Extremities: No clubbing, cyanosis, 1+ pitting leg edema bilaterally. Skin: LE venous stasis dermatitis  Neurologic: Alert and oriented X 3,   grossly non-focal.  Mental status: Alert, oriented, thought content appropriate. Data    Recent Labs     05/12/22 2334 05/13/22 2035   WBC 7.1 7.8   HGB 13.3* 13.2*   HCT 39.5* 39.8*   * 133*      Recent Labs     05/12/22 2334 05/13/22 0447 05/14/22  0802    138 139   K 4.0 4.3 4.2    107 106   CO2 22 25 25   BUN 26* 24* 22*   CREATININE 2.0* 1.7* 1.7*     No results for input(s): AST, ALT, ALB, BILIDIR, BILITOT, ALKPHOS in the last 72 hours. Recent Labs     05/13/22 2036   INR 1.11     Recent Labs     05/12/22 2334 05/13/22 0447 05/13/22  1524   TROPONINI <0.01 <0.01 <0.01       Consults:     IP CONSULT TO HOSPITALIST  IP CONSULT TO PALLIATIVE CARE  IP CONSULT TO NEPHROLOGY    Active Hospital Problems    Diagnosis Date Noted    HCAP (healthcare-associated pneumonia) [J18.9] 05/13/2022     Priority: Medium         ASSESSMENT AND PLAN      #Healthcare associated pneumonia, chest x-ray with left lower lobe consolidation:  Cont with cefepime, azithromycin  Follow-up on procalcitonin, MRSA nares    #AJ:  Improving but not returning to baseline. Continue with IV fluids, check postvoid bladder scan, consult nephrology  Avoid nephrotoxins    #Left-sided chest pain:  Troponin Is negative and EKG is nonischemic. ECHO pending. Presuming patient has PE as D-dimer elevated and lower extremity Doppler positive.      #Acute to subacute DVT:  Started on heparin drip, patient worries about cost of outpatient medications, will ask pharmacy to check the cost of Eliquis and Xarelto on Monday    #History of prostate cancer  Monitor for urinary retention    DVT Prophylaxis: heparin  Diet: ADULT DIET;  Regular  Code Status: Full Code    PT/OT Salma Winters MD

## 2022-05-14 NOTE — CONSULTS
Office: [phone]       Fax: [phone]      Nephrology Initial Consult Note        Patient's Name: Zunilda Must Date: 5/12/2022  Date of Visit: 5/14/2022    Reason for Consult:  AJ/CKD  Requesting Physician:  Joan Foss MD  PCP: Melinda Jaime MD    Chief Complaint:  chest pain      History of Present Illness:      Mary Marquez is a 80 y.o. male with PMHx of OA, hearing loss who was hospitalized on 5/12/2022 with complaints of chest pain  Pt is being treated for pneumonia   Unable to give much history  No hematuria, dysuria  NSAID use: none  IV contrast: None recent   Home meds reviewed     Past Medical History:   Diagnosis Date    Anxiety     Arthritis     Cancer ([address]     prostate    Cancer ([address]     skin    History of blood clots     Hx of blood clots     MRSA infection 3/13/14    leg wound       Past Surgical History:   Procedure Laterality Date    JOINT REPLACEMENT Right 2005    hip    KIDNEY SURGERY  2019    PROSTATE SURGERY         History reviewed. No pertinent family history. reports that he quit smoking about 42 years ago. He has never used smokeless tobacco. He reports current alcohol use. He reports that he does not use drugs. Medications:    Allergies:  Sulfamethoxazole-trimethoprim    Scheduled Meds:   aspirin  81 mg Oral Daily    sodium chloride flush  5-40 mL IntraVENous 2 times per day    azithromycin  500 mg IntraVENous Q24H    cefepime  2,000 mg IntraVENous Q12H     Continuous Infusions:   sodium chloride      heparin (PORCINE) Infusion 18 Units/kg/hr (05/13/22 2200)       Labs:  CBC:   Recent Labs     05/12/22  2334 05/13/22  2035   WBC 7.1 7.8   HGB 13.3* 13.2*   * 133*     Ca/Mg/Phos:   Recent Labs     05/12/22  2334 05/13/22  0447 05/14/22  0802   CALCIUM 9.4 8.9 9.2     UA:No results for input(s): Dewane Overlie, GLUCOSEU, BILIRUBINUR, Urvashi Saber, BLOODU, PHUR, PROTEINU, UROBILINOGEN, NITRU, LEUKOCYTESUR, Sherial Shadow in the last 72 hours. Urine Microscopic: No results for input(s): LABCAST, BACTERIA, COMU, HYALCAST, WBCUA, RBCUA, EPIU in the last 72 hours. Urine Chemistry: No results for input(s): Faylene Ramesh, PROTEINUR, NAUR in the last 72 hours. ROS:     All systems couldn't be reviewed due to patient's condition. Objective:     Vitals: BP (!) 148/77   Pulse 93   Temp 97.8 °F (36.6 °C) (Oral)   Resp 20   Ht 5' 8\" (1.727 m)   Wt 191 lb 5.8 oz (86.8 kg)   SpO2 96%   BMI 29.10 kg/m²    Wt Readings from Last 3 Encounters:   05/14/22 191 lb 5.8 oz (86.8 kg)   09/17/21 189 lb 13.1 oz (86.1 kg)      24HR INTAKE/OUTPUT:      Intake/Output Summary (Last 24 hours) at 5/14/2022 0902  Last data filed at 5/14/2022 2568  Gross per 24 hour   Intake 1673.79 ml   Output 1400 ml   Net 273.79 ml     Constitutional:  awake, NAD  HEENT:  MMM, No icterus  Neck: no bruits, No JVD  Cardiovascular:  reg rhythm  Respiratory: CTA, no crackles  Abdomen:  +BS, soft, NT, ND  Ext: trace lower extremity edema  Psychiatric: mood and affect appropriate  Skin: dry/intact  CNS: alert, no agitation, hard of hearing    IMAGING:  VL Extremity Venous Bilateral         CT CHEST WO CONTRAST   Final Result      1. Evidence of chronic left lower lobe airspace disease with subpulmonic small effusion and basilar atelectasis residing in the same location of previous lower lobe airspace disease in the anterobasal segment, lateral basal segment posterior basal    segment. 2. Chronic lung disease in the periphery of the lingula which is slightly decreased from 2015   3. Stable subpleural benign appearing nodule anterior right middle lobe. XR CHEST (2 VW)   Final Result      Left lower lobe consolidation likely representing pneumonia.              Assessment :     1. AJ on suspected CKD3  -Non-Oliguric  -Baseline creat: [facility] 2->1.7  -UA: none  -Volume: Hypervolemic  mild  -Electrolytes: No Dyskalemia    Recent Labs     05/12/22  2334 05/13/22  0447 05/14/22  0802   BUN 26* 24* 22*   CREATININE 2.0* 1.7* 1.7*     Recent Labs     05/12/22  2334 05/13/22  0447 05/14/22  0802    138 139   K 4.0 4.3 4.2   CO2 22 25 25         2. HTN  -Blood pressure high    BP Readings from Last 1 Encounters:   05/14/22 (!) 148/77       3. pneumonia       Plan:     - UA  - Antimicrobials per primary team   - monitor BP for now  - IVF for hemodynamic support only  - Monitor BMP    -Monitor I/O, UOP  -Maintain MAP>65  -Avoid nephrotoxin, if able. -Dose meds to current eGFR    Thank you for allowing us to participate in care of Luan Patel . We will continue to follow. Feel free to contact me with any questions.       Caryn Forde MD  5/14/2022    [facility] [address] [place] [address] S  Office : [phone]  Fax :[phone]

## 2022-05-14 NOTE — PROGRESS NOTES
Medicated with Tylenol 650 mg po for complaints of left side pain. RA resp easy. Vital signs stable, afebrile. Heparin drip initiated as ordered per wt base protocol. Will check antixa @ 0400. Call light in reach. AvParachutes camera and bed alarm in place.

## 2022-05-15 LAB
ANION GAP SERPL CALCULATED.3IONS-SCNC: 7 MMOL/L (ref 3–16)
ANTI-XA UNFRAC HEPARIN: 0.33 IU/ML (ref 0.3–0.7)
BUN BLDV-MCNC: 21 MG/DL (ref 7–20)
CALCIUM SERPL-MCNC: 9.2 MG/DL (ref 8.3–10.6)
CHLORIDE BLD-SCNC: 106 MMOL/L (ref 99–110)
CO2: 26 MMOL/L (ref 21–32)
CREAT SERPL-MCNC: 1.8 MG/DL (ref 0.8–1.3)
GFR AFRICAN AMERICAN: 43
GFR NON-AFRICAN AMERICAN: 35
GLUCOSE BLD-MCNC: 105 MG/DL (ref 70–99)
HCT VFR BLD CALC: 36.8 % (ref 40.5–52.5)
HEMOGLOBIN: 12.4 G/DL (ref 13.5–17.5)
MCH RBC QN AUTO: 32 PG (ref 26–34)
MCHC RBC AUTO-ENTMCNC: 33.7 G/DL (ref 31–36)
MCV RBC AUTO: 95.1 FL (ref 80–100)
PDW BLD-RTO: 14.4 % (ref 12.4–15.4)
PLATELET # BLD: 161 K/UL (ref 135–450)
PMV BLD AUTO: 8.2 FL (ref 5–10.5)
POTASSIUM REFLEX MAGNESIUM: 4.1 MMOL/L (ref 3.5–5.1)
RBC # BLD: 3.87 M/UL (ref 4.2–5.9)
SODIUM BLD-SCNC: 139 MMOL/L (ref 136–145)
WBC # BLD: 6.3 K/UL (ref 4–11)

## 2022-05-15 PROCEDURE — 85520 HEPARIN ASSAY: CPT

## 2022-05-15 PROCEDURE — 6360000002 HC RX W HCPCS: Performed by: INTERNAL MEDICINE

## 2022-05-15 PROCEDURE — 2580000003 HC RX 258: Performed by: INTERNAL MEDICINE

## 2022-05-15 PROCEDURE — 97530 THERAPEUTIC ACTIVITIES: CPT

## 2022-05-15 PROCEDURE — 97165 OT EVAL LOW COMPLEX 30 MIN: CPT

## 2022-05-15 PROCEDURE — 6370000000 HC RX 637 (ALT 250 FOR IP): Performed by: INTERNAL MEDICINE

## 2022-05-15 PROCEDURE — 80048 BASIC METABOLIC PNL TOTAL CA: CPT

## 2022-05-15 PROCEDURE — 99233 SBSQ HOSP IP/OBS HIGH 50: CPT | Performed by: HOSPITALIST

## 2022-05-15 PROCEDURE — 97535 SELF CARE MNGMENT TRAINING: CPT

## 2022-05-15 PROCEDURE — 85027 COMPLETE CBC AUTOMATED: CPT

## 2022-05-15 PROCEDURE — 1200000000 HC SEMI PRIVATE

## 2022-05-15 PROCEDURE — 97116 GAIT TRAINING THERAPY: CPT

## 2022-05-15 PROCEDURE — 97161 PT EVAL LOW COMPLEX 20 MIN: CPT

## 2022-05-15 PROCEDURE — 36415 COLL VENOUS BLD VENIPUNCTURE: CPT

## 2022-05-15 RX ORDER — QUETIAPINE FUMARATE 25 MG/1
12.5 TABLET, FILM COATED ORAL NIGHTLY PRN
Status: DISCONTINUED | OUTPATIENT
Start: 2022-05-15 | End: 2022-05-18 | Stop reason: HOSPADM

## 2022-05-15 RX ORDER — MECOBALAMIN 5000 MCG
5 TABLET,DISINTEGRATING ORAL NIGHTLY
Status: DISCONTINUED | OUTPATIENT
Start: 2022-05-15 | End: 2022-05-18 | Stop reason: HOSPADM

## 2022-05-15 RX ORDER — LIDOCAINE 4 G/G
1 PATCH TOPICAL DAILY
Status: DISCONTINUED | OUTPATIENT
Start: 2022-05-15 | End: 2022-05-18 | Stop reason: HOSPADM

## 2022-05-15 RX ADMIN — Medication 16 UNITS/KG/HR: at 15:21

## 2022-05-15 RX ADMIN — CEFEPIME HYDROCHLORIDE 2000 MG: 2 INJECTION, POWDER, FOR SOLUTION INTRAVENOUS at 00:17

## 2022-05-15 RX ADMIN — CEFEPIME 1000 MG: 1 INJECTION, POWDER, FOR SOLUTION INTRAMUSCULAR; INTRAVENOUS at 13:02

## 2022-05-15 RX ADMIN — Medication 5 MG: at 20:19

## 2022-05-15 RX ADMIN — SODIUM CHLORIDE, PRESERVATIVE FREE 10 ML: 5 INJECTION INTRAVENOUS at 09:01

## 2022-05-15 RX ADMIN — AZITHROMYCIN 500 MG: 500 INJECTION, POWDER, LYOPHILIZED, FOR SOLUTION INTRAVENOUS at 05:32

## 2022-05-15 RX ADMIN — ASPIRIN 81 MG: 81 TABLET, COATED ORAL at 09:10

## 2022-05-15 RX ADMIN — QUETIAPINE FUMARATE 12.5 MG: 25 TABLET ORAL at 20:19

## 2022-05-15 ASSESSMENT — PAIN DESCRIPTION - PAIN TYPE: TYPE: ACUTE PAIN

## 2022-05-15 ASSESSMENT — PAIN SCALES - GENERAL: PAINLEVEL_OUTOF10: 0

## 2022-05-15 NOTE — PLAN OF CARE
Pt Aox4 with some confusion and impulsive. Pt is [place] pt stated no pain. Pt upx1 with walker to the bathroom pt will not wait for staff. Bed is on bed alarm videos monitoring on sitter in room with patient. Pt continue on heparin and IV antibiotics.

## 2022-05-15 NOTE — PROGRESS NOTES
Occupational Therapy  Facility/Department: [address] [place] [address]  Occupational Therapy Initial Assessment/Treatment    Name: Rasheed Johnston  : 5/3/1930  MRN: [id number]  Date of Service: 5/15/2022    Discharge Recommendations:  Rasheed Johnston scored a 20/24 on the AM-PAC ADL Inpatient form. Current research shows that an AM-PAC score of 18 or greater is typically associated with a discharge to the patient's home setting. Based on the patient's AM-PAC score, and their current ADL deficits, it is recommended that the patient have 2-3 sessions per week of Occupational Therapy at d/c to increase the patient's independence. At this time, this patient demonstrates the endurance and safety to discharge home with home OT and a follow up treatment frequency of 2-3x/wk. Please see assessment section for further patient specific details. If patient discharges prior to next session this note will serve as a discharge summary. Please see below for the latest assessment towards goals. OT Equipment Recommendations  Equipment Needed: No       Patient Diagnosis(es): The primary encounter diagnosis was Pneumonia of left lower lobe due to infectious organism. Diagnoses of Chest pain, unspecified type and AJ (acute kidney injury) ([address] were also pertinent to this visit. Past Medical History:  has a past medical history of Anxiety, Arthritis, Cancer ([address] Cancer ([address] History of blood clots, and MRSA infection. Past Surgical History:  has a past surgical history that includes Prostate surgery; joint replacement (Right, 2005); and Kidney surgery (2019). Treatment Diagnosis: impaired ADLs and functional mobility/transfers      Assessment   Performance deficits / Impairments: Decreased functional mobility ; Decreased endurance;Decreased ADL status; Decreased balance    Assessment: Pt is a 81 y/o M who presents from assisted living community likely near baseline.  Pt reports getting help w/ IADLs, and some ADLs at baseline (dressing/bathing). Pt also reports using rollator at baseline. Pt demo functional mobility w/ RW w/ CGA progressing to SBA. Pt also demo toileting w/ SBA and grooming w/ SBA. Pt has baseline cognitive impairements and is hard of hearing. Pt would benefit from home OT safety eval upon return to assisted living community. No further acute OT needs identified. Will sign off    Treatment Diagnosis: impaired ADLs and functional mobility/transfers  Prognosis: Good;Guarded  Decision Making: Low Complexity  REQUIRES OT FOLLOW-UP: No  Activity Tolerance  Activity Tolerance: Patient Tolerated treatment well;Treatment limited secondary to decreased cognition        Plan   Plan  Times per Week: dc acute OT     Restrictions  Position Activity Restriction  Other position/activity restrictions: up as tolerated    Subjective   General  Chart Reviewed: Yes  Patient assessed for rehabilitation services?: Yes  Additional Pertinent Hx: 80 y.o. male presenting from assisted living with left-sided chest pain.  PMH of anxiety, arthritis, prostate cancer, cognitive impairment  Family / Caregiver Present: No  Referring Practitioner: CHIDI Saravia CNP  Diagnosis: AJ  Subjective  Subjective: Pt supine in bed upon arrival and agreeable to OT/Pt treatment     Social/Functional History  Social/Functional History  Lives With: Alone  Type of Home: Facility ([facility])  Home Layout: One level  Home Access: Level entry  Bathroom Shower/Tub: Walk-in shower  Bathroom Toilet: Handicap height  Bathroom Equipment: Grab bars in shower,Grab bars around toilet  Bathroom Accessibility: Accessible  Home Equipment: Alert Button  Has the patient had two or more falls in the past year or any fall with injury in the past year?: Yes (\"I seem to be subject to fall\")  ADL Assistance: Needs assistance (spvn for showers)  Homemaking Assistance: Needs assistance (facility does cooking and cleaning)  Homemaking Responsibilities: No  Ambulation Assistance: Independent (uses a rollator)  Transfer Assistance: Independent  Active : No  Occupation: Retired  Type of Occupation: director for [employer]  IADL Comments: Has a dog       Objective   Pulse: 81  Heart Rate Source: Monitor  BP: 137/84  MAP (Calculated): 101.67  Resp: 18  SpO2: 98 %  O2 Device: None (Room air)  Vision Exceptions: Wears glasses for reading  Hearing: Exceptions to [facility]  Hearing Exceptions: Hard of hearing/hearing concerns          Safety Devices  Type of Devices: Chair alarm in place;Nurse notified;Sitter present;Telesitter in use;Call light within reach; Left in chair  Restraints  Restraints Initially in Place: No  Balance  Sitting:  (Mod I static, spvn dynamic)  Standing:  (SBA static, CGA-SBA dynamic)  Gait  Overall Level of Assistance: Contact-guard assistance (CGA progressing to SBA at time. functional mobility to/from bathroom; functional mobility in hallway)  Interventions: Verbal cues  Assistive Device: Walker, rolling        ADL  Feeding: Modified independent ; Increased time to complete (pt left w/ breakfast at end of session. Increased time to open containers)  Grooming: Stand by assistance; Increased time to complete (in stance at sink to complete hand hygiene and oral care w/ SBA)  Toileting: Stand by assistance (in stance at toilet to urinate.)     Activity Tolerance  Activity Tolerance: Patient tolerated evaluation without incident;Patient tolerated treatment well  Bed mobility  Supine to Sit: Modified independent  Scooting: Modified independent  Transfers  Sit to stand: Stand by assistance  Stand to sit: Stand by assistance     Cognition  Overall Cognitive Status: Exceptions  Arousal/Alertness: Appropriate responses to stimuli  Following Commands: Follows one step commands with increased time; Follows one step commands with repetition  Attention Span: Attends with cues to redirect  Memory: Decreased short term memory;Decreased recall of recent events  Safety Judgement: Decreased awareness of need for assistance  Problem Solving: Assistance required to identify errors made;Assistance required to correct errors made  Insights: Decreased awareness of deficits  Initiation: Requires cues for some  Sequencing: Requires cues for some                  Education Given To: Patient  Education Provided: Role of Therapy;Plan of Care;ADL Adaptive Strategies;Transfer Training;Orientation  Education Method: Demonstration;Verbal  Barriers to Learning: Cognition  Education Outcome: Verbalized understanding  LUE AROM (degrees)  LUE AROM : WFL  Left Hand AROM (degrees)  Left Hand AROM: WFL  RUE AROM (degrees)  RUE AROM : WFL  Right Hand AROM (degrees)  Right Hand AROM: WFL                     G-Code     OutComes Score                                                  AM-PAC Score        AM-PAC Inpatient Daily Activity Raw Score: 20 (05/15/22 0934)  AM-PAC Inpatient ADL T-Scale Score : 42.03 (05/15/22 0934)  ADL Inpatient CMS 0-100% Score: 38.32 (05/15/22 0934)  ADL Inpatient CMS G-Code Modifier : CJ (05/15/22 0934)    Goals          Therapy Time   Individual Concurrent Group Co-treatment   Time In 0840         Time Out 0920         Minutes 40                   Timed Code Treatment Minutes:  25    Total Treatment Minutes:  [address] unique, OT

## 2022-05-15 NOTE — PROGRESS NOTES
Hospitalist Progress Note      PCP: Zion Gallegos MD    Date of Admission: 2022    Chief Complaint on Admission: left sided chest pain    Pt Seen/Examined and Chart Reviewed. Admitting dx pneumonia    SUBJECTIVE. OBJECTIVE:     She is admitted from assisted living with chest pain. His troponins have been negative serially. Found to have DVT in lower extremities, elevated D-dimer, started on heparin drip, antibiotics for possible pneumonia. Continues to have dry cough and left sided chest pain. Has a sitter now due to intermittent confusion. Allergies  Sulfamethoxazole-trimethoprim    Medications      Scheduled Meds:   lidocaine  1 patch TransDERmal Daily    [START ON 2022] azithromycin  500 mg IntraVENous Q24H    cefepime  1,000 mg IntraVENous Q12H    melatonin  5 mg Oral Nightly    aspirin  81 mg Oral Daily    sodium chloride flush  5-40 mL IntraVENous 2 times per day       Infusions:   sodium chloride      heparin (PORCINE) Infusion 16 Units/kg/hr (22 1751)       PRN Meds:  QUEtiapine, perflutren lipid microspheres, sodium chloride flush, sodium chloride, ondansetron **OR** ondansetron, polyethylene glycol, acetaminophen **OR** acetaminophen, ipratropium-albuterol, heparin (porcine), heparin (porcine)    Vitals    TEMPERATURE:  Current - Temp: 98.1 °F (36.7 °C);  Max - Temp  Av.4 °F (36.9 °C)  Min: 98.1 °F (36.7 °C)  Max: 98.9 °F (37.2 °C)  RESPIRATIONS RANGE: Resp  Av  Min: 18  Max: 18  PULSE RANGE: Pulse  Av.8  Min: 68  Max: 82  BLOOD PRESSURE RANGE:  Systolic (11ZQR), YOX:630 , Min:101 , OOE:605   ; Diastolic (47LTV), VGE:06, Min:63, Max:84    PULSE OXIMETRY RANGE: SpO2  Av %  Min: 95 %  Max: 98 %  24HR INTAKE/OUTPUT:      Intake/Output Summary (Last 24 hours) at 5/15/2022 1230  Last data filed at 5/15/2022 1000  Gross per 24 hour   Intake 840 ml   Output --   Net 840 ml       Exam:      General appearance: No apparent distress, appears stated age and cooperative. Lungs: diminished breath sounds  Heart: Regular rate and rhythm with Normal S1/S2 without  murmurs, rubs or gallops, point of maximum impulse non-displaced  Abdomen: Soft, non-tender or non-distended without rigidity or guarding and positive bowel sounds all four quadrants. Extremities: No clubbing, cyanosis, 1+ pitting leg edema bilaterally. Skin: LE venous stasis dermatitis  Neurologic: Alert and oriented X 3,   grossly non-focal.  Mental status: Alert, oriented, thought content appropriate. Data    Recent Labs     05/12/22  2334 05/13/22  2035 05/15/22  0746   WBC 7.1 7.8 6.3   HGB 13.3* 13.2* 12.4*   HCT 39.5* 39.8* 36.8*   * 133* 161      Recent Labs     05/13/22 0447 05/14/22  0802 05/15/22  0746    139 139   K 4.3 4.2 4.1    106 106   CO2 25 25 26   BUN 24* 22* 21*   CREATININE 1.7* 1.7* 1.8*     No results for input(s): AST, ALT, ALB, BILIDIR, BILITOT, ALKPHOS in the last 72 hours. Recent Labs     05/13/22 2036   INR 1.11     Recent Labs     05/12/22 2334 05/13/22 0447 05/13/22  1524   TROPONINI <0.01 <0.01 <0.01       Consults:     IP CONSULT TO HOSPITALIST  IP CONSULT TO PALLIATIVE CARE  IP CONSULT TO NEPHROLOGY    Active Hospital Problems    Diagnosis Date Noted    HCAP (healthcare-associated pneumonia) [J18.9] 05/13/2022     Priority: Medium         ASSESSMENT AND PLAN      #Healthcare associated pneumonia, chest x-ray with left lower lobe consolidation:  Cont with cefepime, azithromycin    #AJ:  Improving but not returning to baseline. Continue with IV fluids, check postvoid bladder scan, consulted nephrology  Avoid nephrotoxins  Check renal US    #Left-sided chest pain:  Troponin Is negative and EKG is nonischemic. ECHO pending. Presuming patient has PE as D-dimer elevated and lower extremity Doppler positive.      #Acute to subacute DVT:  Started on heparin drip, patient worries about cost of outpatient medications, will ask pharmacy to check the cost of Eliquis and Xarelto on Monday    #History of prostate cancer  Monitor for urinary retention    DVT Prophylaxis: heparin  Diet: ADULT DIET;  Regular  Code Status: Full Code    PT/OT Bridget Ni MD

## 2022-05-15 NOTE — PROGRESS NOTES
Pharmacy Note - Renal Dosing    Cefepime ordered for treatment of HAP. Per [facility] Renal Dose Adjustment Policy, cefepime will be changed to 1000 mg IV q12h extended infusion. Estimated Creatinine Clearance: Estimated Creatinine Clearance: 28 mL/min (A) (based on SCr of 1.8 mg/dL (H)). Dialysis Status, AJ, CKD: AJ  BMI: Body mass index is 29.1 kg/m². Rationale for Adjustment: Agent is renally eliminated. Pharmacy will continue to monitor renal function and adjust dose as necessary. Please call with any questions.     Mckenna Bryant, PharmD, [facility]  Wireless: [phone]  5/15/2022 10:46 AM

## 2022-05-15 NOTE — PROGRESS NOTES
Office: [phone]       Fax: [phone]      Nephrology Progress Note        Patient's Name: Rajni Ann Date: 5/12/2022  Date of Visit: 5/15/2022    Reason for Consult:  AJ/CKD  Requesting Physician:  Adenike Singh MD  PCP: Cheli Rayo MD    Chief Complaint:  chest pain      History of Present Illness:      Nino Orona is a 80 y.o. male with PMHx of OA, hearing loss who was hospitalized on 5/12/2022 with complaints of chest pain  Pt is being treated for pneumonia   Unable to give much history  No hematuria, dysuria  NSAID use: none  IV contrast: None recent   Home meds reviewed     INTERVAL HISTORY    Feels better  Shortness of breath: No   UOP: Fair  Creat: stable     Past Medical History:   Diagnosis Date    Anxiety     Arthritis     Cancer ([address]     prostate    Cancer ([facility] [address]     skin    History of blood clots     MRSA infection 03/13/2014    leg wound       Past Surgical History:   Procedure Laterality Date    JOINT REPLACEMENT Right 2005    hip    KIDNEY SURGERY  2019    PROSTATE SURGERY         History reviewed. No pertinent family history. reports that he quit smoking about 42 years ago. He has never used smokeless tobacco. He reports current alcohol use. He reports that he does not use drugs. Medications:    Allergies:  Sulfamethoxazole-trimethoprim    Scheduled Meds:   aspirin  81 mg Oral Daily    sodium chloride flush  5-40 mL IntraVENous 2 times per day    azithromycin  500 mg IntraVENous Q24H    cefepime  2,000 mg IntraVENous Q12H     Continuous Infusions:   sodium chloride      heparin (PORCINE) Infusion 16 Units/kg/hr (05/14/22 2861)       Labs:  CBC:   Recent Labs     05/12/22  2334 05/13/22  2035 05/15/22  0746   WBC 7.1 7.8 6.3   HGB 13.3* 13.2* 12.4*   * 133* 161     Ca/Mg/Phos:   Recent Labs 05/13/22  0447 05/14/22  0802 05/15/22  0746   CALCIUM 8.9 9.2 9.2     UA:No results for input(s): Sánchez Ludin, GLUCOSEU, BILIRUBINUR, KETUA, SPECGRAV, BLOODU, PHUR, PROTEINU, UROBILINOGEN, NITRU, LEUKOCYTESUR, Norvell Cowper in the last 72 hours. Urine Microscopic: No results for input(s): LABCAST, BACTERIA, COMU, HYALCAST, WBCUA, RBCUA, EPIU in the last 72 hours. Urine Chemistry: No results for input(s): Davidson Hashimoto, PROTEINUR, NAUR in the last 72 hours. Objective:     Vitals: /84   Pulse 81   Temp 98.3 °F (36.8 °C) (Oral)   Resp 18   Ht 5' 8\" (1.727 m)   Wt 191 lb 5.8 oz (86.8 kg)   SpO2 98%   BMI 29.10 kg/m²    Wt Readings from Last 3 Encounters:   05/14/22 191 lb 5.8 oz (86.8 kg)   09/17/21 189 lb 13.1 oz (86.1 kg)      24HR INTAKE/OUTPUT:      Intake/Output Summary (Last 24 hours) at 5/15/2022 0943  Last data filed at 5/14/2022 1808  Gross per 24 hour   Intake 720 ml   Output --   Net 720 ml     Constitutional:  awake, NAD  HEENT:  MMM, No icterus  Neck: no bruits, No JVD  Cardiovascular:  reg rhythm  Respiratory: CTA, no crackles  Abdomen:  +BS, soft, NT, ND  Ext: trace lower extremity edema    CNS: alert, no agitation, hard of hearing    IMAGING:  VL Extremity Venous Bilateral         CT CHEST WO CONTRAST   Final Result      1. Evidence of chronic left lower lobe airspace disease with subpulmonic small effusion and basilar atelectasis residing in the same location of previous lower lobe airspace disease in the anterobasal segment, lateral basal segment posterior basal    segment. 2. Chronic lung disease in the periphery of the lingula which is slightly decreased from 2015   3. Stable subpleural benign appearing nodule anterior right middle lobe. XR CHEST (2 VW)   Final Result      Left lower lobe consolidation likely representing pneumonia.              Assessment :     1. AJ on suspected CKD3  -Non-Oliguric  -Baseline creat: [facility] Now 2->1.7  -UA: none  -Volume: Hypervolemic  mild  -Electrolytes: No Dyskalemia    Recent Labs     05/12/22  2334 05/13/22  0447 05/14/22  0802 05/15/22  0746   BUN 26* 24* 22* 21*   CREATININE 2.0* 1.7* 1.7* 1.8*     Recent Labs     05/12/22  2334 05/13/22  0447 05/14/22  0802 05/15/22  0746    138 139 139   K 4.0 4.3 4.2 4.1   CO2 22 25 25 26         2. HTN  -Blood pressure at goal     BP Readings from Last 1 Encounters:   05/15/22 137/84       3. pneumonia       Plan:     - UA  - Antimicrobials per primary team   - monitor BP for now  - IVF for hemodynamic support only  - Monitor BMP    -Monitor I/O, UOP  -Maintain MAP>65  -Avoid nephrotoxin, if able. -Dose meds to current eGFR    Thank you for allowing us to participate in care of Gopi Mohr . We will continue to follow. Feel free to contact me with any questions.       Sanket Worley MD  5/15/2022    [facility] [address]  Office : [phone]  Fax :[phone]

## 2022-05-15 NOTE — PROGRESS NOTES
Physical Therapy  Facility/Department: [address]  Physical Therapy Initial Assessment/discharge note      Name: Quoc Jay  : 5/3/1930  MRN: [id number]  Date of Service: 5/15/2022    Discharge Recommendations:Ziggy Caraballo scored a 20/24 on the AM-PAC short mobility form. Current research shows that an AM-PAC score of 18 or greater is typically associated with a discharge to the patient's home setting. Based on the patient's AM-PAC score and their current functional mobility deficits, it is recommended that the patient have 2-3 sessions per week of Physical Therapy at d/c to increase the patient's independence. At this time, this patient demonstrates the endurance and safety to discharge home with  (home  services) and a follow up treatment frequency of 2-3x/wk. Please see assessment section for further patient specific details. .         PT Equipment Recommendations  Equipment Needed: No      Patient Diagnosis(es): The primary encounter diagnosis was Pneumonia of left lower lobe due to infectious organism. Diagnoses of Chest pain, unspecified type and AJ (acute kidney injury) ([address] were also pertinent to this visit. Past Medical History:  has a past medical history of Anxiety, Arthritis, Cancer ([address] Cancer ([address] History of blood clots, and MRSA infection. Past Surgical History:  has a past surgical history that includes Prostate surgery; joint replacement (Right, 2005); and Kidney surgery (2019). Assessment   Assessment: 79 yo admitted 22 for AJ/DVT/PNA. Pt confused at times (per chart is baseline) and he reports he utilizes a rollator in ALU. Pt plans to return to [facility] at discharge. If home, recommend 24 hour supervision initially, home PT safety eval and use of RW at all times. No further acute PT needs identified so will sign off. Recommend nursing continue to encourage ambulation PRN and often with RW while pt in hospital. RN aware.   Treatment Diagnosis: mobility impairment due to PNA  Decision Making: Low Complexity  Requires PT Follow-Up: No  Activity Tolerance  Activity Tolerance: Patient tolerated evaluation without incident;Patient tolerated treatment well     Plan   Safety Devices  Type of Devices: Chair alarm in place,Nurse notified,Sitter present,Telesitter in use,Call light within reach,Left in chair  Restraints  Restraints Initially in Place: No     Restrictions  Position Activity Restriction  Other position/activity restrictions: up as tolerated     Subjective   General  Chart Reviewed: Yes  Patient assessed for rehabilitation services?: Yes  Additional Pertinent Hx: 81 yo admitted 5/12/22 for AJ/PNA/DVT. CXR/chest CT positive PNA; B LE doppler positive acute on chronic L DVT; echo pending. Pmhx: Anxiety, Arthritis, Cancer ([facility] Cancer ([facility] [address] History of blood clots  Family / Caregiver Present: No  Diagnosis: AJ/PNA/DVT  Follows Commands: Within Functional Limits (limited by profound Resighini)  Subjective  Subjective: Pt found supine in bed and agreeable to PT. \" I need hearing aids but they told me it wouldn't help.  \"         Social/Functional History  Social/Functional History  Lives With: Alone  [facility]  Home Layout: One level  Home Access: Level entry  Bathroom Shower/Tub: Walk-in shower  Bathroom Toilet: Handicap height  Bathroom Equipment: Grab bars in shower,Grab bars around toilet  Bathroom Accessibility: Accessible  Home Equipment: Alert Button  Has the patient had two or more falls in the past year or any fall with injury in the past year?: Yes (\"I seem to be subject to fall\")  ADL Assistance: Needs assistance (spvn for showers)  Homemaking Assistance: Needs assistance (facility does cooking and cleaning)  Homemaking Responsibilities: No  Ambulation Assistance: Independent (uses a rollator)  Transfer Assistance: Independent  Active : No  Occupation: Retired  Type of Occupation: director for [employer]  IADL Comments: Has a dog     Vision/Hearing  Vision Exceptions: Wears glasses for reading  Hearing: Exceptions to [facility]  Hearing Exceptions: Hard of hearing/hearing concerns      Cognition   Orientation  Overall Orientation Status: Impaired  Orientation Level: Oriented to person;Disoriented to time;Oriented to situation  Cognition  Overall Cognitive Status: Exceptions  Arousal/Alertness: Appropriate responses to stimuli  Following Commands: Follows one step commands with increased time; Follows one step commands with repetition  Attention Span: Attends with cues to redirect  Memory: Decreased short term memory;Decreased recall of recent events  Safety Judgement: Decreased awareness of need for assistance  Problem Solving: Assistance required to identify errors made;Assistance required to correct errors made  Insights: Decreased awareness of deficits  Initiation: Requires cues for some  Sequencing: Requires cues for some     Objective              AROM RLE (degrees)  RLE AROM: WFL  AROM LLE (degrees)  LLE AROM : WFL     Strength RLE  Strength RLE: WFL  Strength LLE  Strength LLE: WFL        Balance  Sitting:  (Mod I static, spvn dynamic)  Standing:  (SBA static, CGA-SBA dynamic)      Bed mobility  Supine to Sit: Modified independent  Scooting: Modified independent     Transfers  Sit to Stand: Stand by assistance (x2 trials)  Stand to sit: Stand by assistance (x2 trials)     Ambulation  Device: Rolling Walker  Assistance: Contact guard assistance (progressing to supervision)  Quality of Gait: slight forward posture with decreased step length; slow latisha and no overt LOB  Noted  Distance: 10 ft x2; 120 ft  Stairs/Curb  Stairs? :  (not assessed due to short IV lines this am (on heparin))              AM-PAC Score  AM-PAC Inpatient Mobility Raw Score : 20 (05/15/22 0933)  AM-PAC Inpatient T-Scale Score : 47.67 (05/15/22 0933)  Mobility Inpatient CMS 0-100% Score: 35.83 (05/15/22 0883)  Mobility Inpatient CMS G-Code Modifier : Daina Cox (05/15/22 0815)       Education  Patient Education  Education Given To: Patient  Education Provided: Role of Therapy (importance of OOB mobility)  Education Method: Verbal  Barriers to Learning: Cognition  Education Outcome: Verbalized understanding;Continued education needed      Therapy Time   Individual Concurrent Group Co-treatment   Time In 0840         Time Out 0920         Minutes 40           Timed Code Treatment Minutes:30       Total Treatment Minutes:  40       Norris West PT

## 2022-05-16 ENCOUNTER — APPOINTMENT (OUTPATIENT)
Dept: ULTRASOUND IMAGING | Age: 87
DRG: 175 | End: 2022-05-16
Payer: MEDICARE

## 2022-05-16 LAB
ANION GAP SERPL CALCULATED.3IONS-SCNC: 9 MMOL/L (ref 3–16)
ANTI-XA UNFRAC HEPARIN: 0.16 IU/ML (ref 0.3–0.7)
BUN BLDV-MCNC: 28 MG/DL (ref 7–20)
CALCIUM SERPL-MCNC: 9.5 MG/DL (ref 8.3–10.6)
CHLORIDE BLD-SCNC: 107 MMOL/L (ref 99–110)
CO2: 26 MMOL/L (ref 21–32)
CREAT SERPL-MCNC: 1.8 MG/DL (ref 0.8–1.3)
GFR AFRICAN AMERICAN: 43
GFR NON-AFRICAN AMERICAN: 35
GLUCOSE BLD-MCNC: 105 MG/DL (ref 70–99)
LV EF: 58 %
LVEF MODALITY: NORMAL
POTASSIUM REFLEX MAGNESIUM: 4.7 MMOL/L (ref 3.5–5.1)
SODIUM BLD-SCNC: 142 MMOL/L (ref 136–145)

## 2022-05-16 PROCEDURE — 2580000003 HC RX 258: Performed by: INTERNAL MEDICINE

## 2022-05-16 PROCEDURE — 1200000000 HC SEMI PRIVATE

## 2022-05-16 PROCEDURE — 6370000000 HC RX 637 (ALT 250 FOR IP): Performed by: INTERNAL MEDICINE

## 2022-05-16 PROCEDURE — 6360000002 HC RX W HCPCS: Performed by: INTERNAL MEDICINE

## 2022-05-16 PROCEDURE — 76770 US EXAM ABDO BACK WALL COMP: CPT

## 2022-05-16 PROCEDURE — 93306 TTE W/DOPPLER COMPLETE: CPT

## 2022-05-16 PROCEDURE — 85520 HEPARIN ASSAY: CPT

## 2022-05-16 PROCEDURE — 80048 BASIC METABOLIC PNL TOTAL CA: CPT

## 2022-05-16 PROCEDURE — 99233 SBSQ HOSP IP/OBS HIGH 50: CPT | Performed by: INTERNAL MEDICINE

## 2022-05-16 PROCEDURE — 36415 COLL VENOUS BLD VENIPUNCTURE: CPT

## 2022-05-16 RX ADMIN — CEFEPIME 1000 MG: 1 INJECTION, POWDER, FOR SOLUTION INTRAMUSCULAR; INTRAVENOUS at 13:13

## 2022-05-16 RX ADMIN — ASPIRIN 81 MG: 81 TABLET, COATED ORAL at 07:39

## 2022-05-16 RX ADMIN — AZITHROMYCIN MONOHYDRATE 500 MG: 500 INJECTION, POWDER, LYOPHILIZED, FOR SOLUTION INTRAVENOUS at 00:54

## 2022-05-16 RX ADMIN — SODIUM CHLORIDE, PRESERVATIVE FREE 10 ML: 5 INJECTION INTRAVENOUS at 07:42

## 2022-05-16 RX ADMIN — CEFEPIME 1000 MG: 1 INJECTION, POWDER, FOR SOLUTION INTRAMUSCULAR; INTRAVENOUS at 01:52

## 2022-05-16 RX ADMIN — Medication 16 UNITS/KG/HR: at 17:00

## 2022-05-16 ASSESSMENT — PAIN SCALES - GENERAL
PAINLEVEL_OUTOF10: 0
PAINLEVEL_OUTOF10: 2

## 2022-05-16 ASSESSMENT — PAIN DESCRIPTION - LOCATION: LOCATION: BACK

## 2022-05-16 ASSESSMENT — PAIN DESCRIPTION - FREQUENCY: FREQUENCY: INTERMITTENT

## 2022-05-16 ASSESSMENT — PAIN DESCRIPTION - ORIENTATION: ORIENTATION: MID

## 2022-05-16 ASSESSMENT — PAIN DESCRIPTION - DESCRIPTORS: DESCRIPTORS: SORE

## 2022-05-16 ASSESSMENT — PAIN DESCRIPTION - PAIN TYPE: TYPE: ACUTE PAIN

## 2022-05-16 NOTE — PROGRESS NOTES
Pt A&Ox3/4 this shift with occasional confusion/forgetfulness. Very hard of hearing. Continuous Heparin gtt at 16 units/kg/hr. Pt resting with no complaints at this time. RR easy and unlabored. Bed locked and in lowest position. Video monitor in use. Pt updated on care plan/status. Will continue to monitor.

## 2022-05-16 NOTE — PROGRESS NOTES
Palliative Care Chart Review  and Check in Note:     NAME:  Isa Mcdermott  Admit Date: 2022  Hospital Day:  Hospital Day: 5   Current Code status: Full Code    Palliative care is continuing to following Mr. Smooth Lunsford for symptom management,  and goals of care discussion as needed. Patient's chart reviewed today 22. Pt's son Inge Thompson sent in a copy of pt's [de-identified] and living will, names pt's  wife as his primary agent and son Sami De as his secondary agent - therefore Sami De is HCPOA. Spoke with pt's son Inge Thompson over the phone, he reports the family spoke over the weekend and are open to changing pt's code status to [facility]. Explained would need to confirm that with Sami De as he is POA. Inge Thompson expressed understanding. Called William, went straight to . Left  with callback number. Addendum: Spoke with pt's son Sami De, confirms that they want pt to be a DNRCCA. The following are the currently established goals/code status, and Symptom management. Goals of care: Continue with current management    Code status: [facility], confirmed today, placed portable [place] DNR form on chart for MD to sign    Discharge plan: Planning for home with shanae Johnson/w pt's son Inge Thompson today.        Romilda Romberg, APRN - CNP  22  3:56 PM

## 2022-05-16 NOTE — CARE COORDINATION
CM following. Pt is from [place] [address] with [facility] ([address] Plan is to return to AL with [facility], as PT/OT evals do not indicate need for SNF. CM will continue to follow for DC needs and recs.         Electronically signed by MARGI Marino, DARCIE on 5/16/2022 at 5:14 PM

## 2022-05-16 NOTE — PROGRESS NOTES
Hospitalist Progress Note      PCP: Chioma Kimbrough MD    Date of Admission: 5/12/2022    Chief Complaint on Admission: left sided chest pain        80 y.o. male who presents from his assisted living facility with complaints of left-sided chest pain. Pain is located in left upper chest with no radiation. Pain gets worse with deep breathing and cough. Patient is unable to describe his symptoms fully. States that pain got better when EMS gave him sublingual nitroglycerin. Denies associated shortness of breath, nausea, vomiting, diaphoresis.     Patient is a poor historian given some cognitive impairment at baseline as well as hearing impairment.     According to the patient's son he has been somewhat confused over the past 2 days and had a nonproductive mild cough. SUBJECTIVE. OBJECTIVE:     Admitted from assisted living with chest pain. His troponins was negative serially. Found to have DVT in lower extremities, elevated D-dimer, started on heparin drip, antibiotics for possible pneumonia. Had a sitter  due to intermittent confusion yesterday. No sitter today    Denies new complaints       Allergies  Sulfamethoxazole-trimethoprim    Medications        Exam:   Vitals BP (!) 156/80   Pulse 65   Temp 98.3 °F (36.8 °C) (Oral)   Resp 18   Ht 5' 8\" (1.727 m)   Wt 191 lb 5.8 oz (86.8 kg)   SpO2 95%   BMI 29.10 kg/m²     General appearance: No apparent distress, appears stated age and cooperative. Lungs: diminished breath sounds  Heart: Regular rate and rhythm with Normal S1/S2 without  murmurs, rubs or gallops, point of maximum impulse non-displaced  Abdomen: Soft, non-tender or non-distended without rigidity or guarding and positive bowel sounds all four quadrants. Extremities: No clubbing, cyanosis, 1+ pitting leg edema bilaterally.     Skin: LE venous stasis dermatitis  Neurologic: Alert and oriented X 3,   grossly non-focal.  Mental status: Alert, oriented, thought content appropriate. Data    Recent Labs     05/13/22  2035 05/15/22  0746   WBC 7.8 6.3   HGB 13.2* 12.4*   HCT 39.8* 36.8*   * 161      Recent Labs     05/14/22  0802 05/15/22  0746 05/16/22  0656    139 142   K 4.2 4.1 4.7    106 107   CO2 25 26 26   BUN 22* 21* 28*   CREATININE 1.7* 1.8* 1.8*     No results for input(s): AST, ALT, ALB, BILIDIR, BILITOT, ALKPHOS in the last 72 hours. Recent Labs     05/13/22 2036   INR 1.11     Recent Labs     05/13/22  1524   TROPONINI <0.01       Consults:     IP CONSULT TO HOSPITALIST  IP CONSULT TO PALLIATIVE CARE  IP CONSULT TO NEPHROLOGY    Active Hospital Problems    Diagnosis Date Noted    HCAP (healthcare-associated pneumonia) [J18.9] 05/13/2022     Priority: Medium         ASSESSMENT AND PLAN        #Left-sided chest pain  Probable PE  Ruled out for Acute MI  EKG is nonischemic. Has bilateral proximal DVT  History of prostate cancer: F/u with urology  Appear did not get a CTA thorax done, as likely was felt will not change mgt  ECHO pending. Hearin gtt--> OAC in AM  - Repeat CBC in AM        #Acute DVT  Subacute DVT  - Has bilateral proximal DVTs, acute and subacute  - Started on heparin drip  - Transition to [address] [place] [address]      #Pulmonary airspace disease  Chest x-ray with left lower lobe consolidation  CT chest:   chronic left lower lobe airspace disease with subpulmonic small effusion and basilar atelectasis residing in the same location of previous lower lobe airspace disease in the anterobasal segment, lateral basal segment posterior basal    segment.    - Was treated with cefepime, azithromycin for possible pneumonia  - MRSA screen negative  - PRoCal: was 0.04  - Repeat PCT in AM: - low threshold to DC antibx, or transition to PO antibx to complete empiric course  - Repeat CBCwith diff in AM        #CKD stage 3 of unilateral kidney: POA  - Cr appear baseline around 2 from outside labs: AJ; ruled out  - Avoid nephrotoxins      #History of prostate cancer  Monitor for urinary retention        DVT Prophylaxis: heparin  Diet: ADULT DIET; Regular  Code Status: Full Code    PT/OT Eval Status: noted    Disposition - inpt  Echo today  Transition to [facility] in AM  Possibly DC to o/p F/u ; Home PT/OT by tomorrow if nil new  Pall Med following.  Son Timmy England, confirms that they want pt to be a Nam Cruz MD

## 2022-05-16 NOTE — PROGRESS NOTES
Office: [phone]       Fax: [phone]      Nephrology Progress Note        Patient's Name: Ant Tanner Date: 5/12/2022  Date of Visit: 5/16/2022    Reason for Consult:  AJ/CKD  Requesting Physician:  Tee Suarez MD  PCP: Debbie Lyman MD    Chief Complaint:  chest pain      History of Present Illness:      Rasheed Johnston is a 80 y.o. male with PMHx of OA, hearing loss who was hospitalized on 5/12/2022 with complaints of chest pain  Pt is being treated for pneumonia   Unable to give much history  No hematuria, dysuria  NSAID use: none  IV contrast: None recent   Home meds reviewed     INTERVAL HISTORY    Feels better  Shortness of breath: No   UOP: Fair  Creat: stable     Past Medical History:   Diagnosis Date    Anxiety     Arthritis     Cancer ([address]     prostate    Cancer ([facility] [address]     skin    History of blood clots     MRSA infection 03/13/2014    leg wound       Past Surgical History:   Procedure Laterality Date    JOINT REPLACEMENT Right 2005    hip    KIDNEY SURGERY  2019    PROSTATE SURGERY         History reviewed. No pertinent family history. reports that he quit smoking about 42 years ago. He has never used smokeless tobacco. He reports current alcohol use. He reports that he does not use drugs. Medications:    Allergies:  Sulfamethoxazole-trimethoprim    Scheduled Meds:   lidocaine  1 patch TransDERmal Daily    azithromycin  500 mg IntraVENous Q24H    cefepime  1,000 mg IntraVENous Q12H    melatonin  5 mg Oral Nightly    aspirin  81 mg Oral Daily    sodium chloride flush  5-40 mL IntraVENous 2 times per day     Continuous Infusions:   sodium chloride      heparin (PORCINE) Infusion 16 Units/kg/hr (05/15/22 1928)       Labs:  CBC:   Recent Labs     05/13/22  2035 05/15/22  0746   WBC 7.8 6.3   HGB 13.2* 12.4*   PLT 133* 161     Ca/Mg/Phos:   Recent Labs     05/14/22  0802 05/15/22  0746 05/16/22  0656   CALCIUM 9.2 9.2 9.5     UA:No results for input(s): Luba Cover, GLUCOSEU, BILIRUBINUR, Daysi Marco, BLOODU, PHUR, PROTEINU, UROBILINOGEN, NITRU, LEUKOCYTESUR, Lawrence Martinez in the last 72 hours. Urine Microscopic: No results for input(s): LABCAST, BACTERIA, COMU, HYALCAST, WBCUA, RBCUA, EPIU in the last 72 hours. Urine Chemistry: No results for input(s): Tomeka Haring, PROTEINUR, NAUR in the last 72 hours. Objective:     Vitals: BP (!) 156/80   Pulse 65   Temp 98.3 °F (36.8 °C) (Oral)   Resp 18   Ht 5' 8\" (1.727 m)   Wt 191 lb 5.8 oz (86.8 kg)   SpO2 95%   BMI 29.10 kg/m²    Wt Readings from Last 3 Encounters:   05/14/22 191 lb 5.8 oz (86.8 kg)   09/17/21 189 lb 13.1 oz (86.1 kg)      24HR INTAKE/OUTPUT:      Intake/Output Summary (Last 24 hours) at 5/16/2022 1123  Last data filed at 5/16/2022 8367  Gross per 24 hour   Intake 450 ml   Output 1675 ml   Net -1225 ml     Constitutional:  awake, NAD  HEENT:  MMM, No icterus  Neck: no bruits, No JVD  Cardiovascular:  reg rhythm  Respiratory: CTA, no crackles  Abdomen:  +BS, soft, NT, ND  Ext: trace lower extremity edema    CNS: alert, no agitation, hard of hearing    IMAGING:  US RENAL COMPLETE   Final Result   IMPRESSION :      No hydronephrosis. Status post left nephrectomy. VL Extremity Venous Bilateral         CT CHEST WO CONTRAST   Final Result      1. Evidence of chronic left lower lobe airspace disease with subpulmonic small effusion and basilar atelectasis residing in the same location of previous lower lobe airspace disease in the anterobasal segment, lateral basal segment posterior basal    segment. 2. Chronic lung disease in the periphery of the lingula which is slightly decreased from 2015   3. Stable subpleural benign appearing nodule anterior right middle lobe.       XR CHEST (2 VW)   Final Result      Left lower lobe consolidation likely representing pneumonia. Assessment :     1. AJ on suspected CKD3  -Non-Oliguric  -Baseline creat: [facility] Now 2->1.7  -UA: none  -Volume: Hypervolemic  mild  -Electrolytes: No Dyskalemia    Recent Labs     05/14/22  0802 05/15/22  0746 05/16/22  0656   BUN 22* 21* 28*   CREATININE 1.7* 1.8* 1.8*     Recent Labs     05/14/22  0802 05/15/22  0746 05/16/22  0656    139 142   K 4.2 4.1 4.7   CO2 25 26 26         2. HTN  -Blood pressure at goal     BP Readings from Last 1 Encounters:   05/16/22 (!) 156/80       3. pneumonia       Plan:     - UA  - Antimicrobials per primary team   - monitor BP for now  - IVF for hemodynamic support only  - Monitor BMP    -Monitor I/O, UOP  -Maintain MAP>65  -Avoid nephrotoxin, if able. -Dose meds to current eGFR    Thank you for allowing us to participate in care of Kingston Patel . We will continue to follow. Feel free to contact me with any questions.       Delano Vaughn MD  5/16/2022    [facility] [address]  Office : [phone]  Fax :[phone]

## 2022-05-16 NOTE — PLAN OF CARE
Problem: Safety - Adult  Goal: Free from fall injury  Outcome: Progressing     Problem: Pain  Goal: Verbalizes/displays adequate comfort level or baseline comfort level  Outcome: Progressing     Problem: Discharge Planning  Goal: Discharge to home or other facility with appropriate resources  Outcome: Progressing     Problem: Skin/Tissue Integrity  Goal: Absence of new skin breakdown  Description: 1. Monitor for areas of redness and/or skin breakdown  2. Assess vascular access sites hourly  3. Every 4-6 hours minimum:  Change oxygen saturation probe site  4. Every 4-6 hours:  If on nasal continuous positive airway pressure, respiratory therapy assess nares and determine need for appliance change or resting period.   Outcome: Progressing

## 2022-05-16 NOTE — PROGRESS NOTES
Occupational Therapy/Physical Therapy    Orders received, chart reviewed. Pt was evaluated on 5/15 (see evaluations dated 5/15 for information). PT/OT signed off at that time - as pt was functioning near his baseline. Will discontinue new order placed today.     Nellie Roque OTR/L [address], [facility]

## 2022-05-17 ENCOUNTER — APPOINTMENT (OUTPATIENT)
Dept: GENERAL RADIOLOGY | Age: 87
DRG: 175 | End: 2022-05-17
Payer: MEDICARE

## 2022-05-17 LAB
ANION GAP SERPL CALCULATED.3IONS-SCNC: 9 MMOL/L (ref 3–16)
ANTI-XA UNFRAC HEPARIN: 0.24 IU/ML (ref 0.3–0.7)
ANTI-XA UNFRAC HEPARIN: 0.67 IU/ML (ref 0.3–0.7)
BASOPHILS ABSOLUTE: 0 K/UL (ref 0–0.2)
BASOPHILS RELATIVE PERCENT: 0.3 %
BUN BLDV-MCNC: 28 MG/DL (ref 7–20)
CALCIUM SERPL-MCNC: 9.9 MG/DL (ref 8.3–10.6)
CHLORIDE BLD-SCNC: 105 MMOL/L (ref 99–110)
CO2: 25 MMOL/L (ref 21–32)
CREAT SERPL-MCNC: 1.7 MG/DL (ref 0.8–1.3)
EOSINOPHILS ABSOLUTE: 0.1 K/UL (ref 0–0.6)
EOSINOPHILS RELATIVE PERCENT: 1.9 %
GFR AFRICAN AMERICAN: 46
GFR NON-AFRICAN AMERICAN: 38
GLUCOSE BLD-MCNC: 100 MG/DL (ref 70–99)
HCT VFR BLD CALC: 37.3 % (ref 40.5–52.5)
HEMOGLOBIN: 12.5 G/DL (ref 13.5–17.5)
LYMPHOCYTES ABSOLUTE: 1.5 K/UL (ref 1–5.1)
LYMPHOCYTES RELATIVE PERCENT: 24.1 %
MCH RBC QN AUTO: 32.2 PG (ref 26–34)
MCHC RBC AUTO-ENTMCNC: 33.6 G/DL (ref 31–36)
MCV RBC AUTO: 95.9 FL (ref 80–100)
MONOCYTES ABSOLUTE: 0.6 K/UL (ref 0–1.3)
MONOCYTES RELATIVE PERCENT: 10.5 %
NEUTROPHILS ABSOLUTE: 3.9 K/UL (ref 1.7–7.7)
NEUTROPHILS RELATIVE PERCENT: 63.2 %
PDW BLD-RTO: 14 % (ref 12.4–15.4)
PLATELET # BLD: 204 K/UL (ref 135–450)
PMV BLD AUTO: 7.4 FL (ref 5–10.5)
POTASSIUM REFLEX MAGNESIUM: 4.5 MMOL/L (ref 3.5–5.1)
RBC # BLD: 3.89 M/UL (ref 4.2–5.9)
SODIUM BLD-SCNC: 139 MMOL/L (ref 136–145)
WBC # BLD: 6.2 K/UL (ref 4–11)

## 2022-05-17 PROCEDURE — 72100 X-RAY EXAM L-S SPINE 2/3 VWS: CPT

## 2022-05-17 PROCEDURE — 6370000000 HC RX 637 (ALT 250 FOR IP): Performed by: INTERNAL MEDICINE

## 2022-05-17 PROCEDURE — 2580000003 HC RX 258: Performed by: INTERNAL MEDICINE

## 2022-05-17 PROCEDURE — 85520 HEPARIN ASSAY: CPT

## 2022-05-17 PROCEDURE — 85025 COMPLETE CBC W/AUTO DIFF WBC: CPT

## 2022-05-17 PROCEDURE — 1200000000 HC SEMI PRIVATE

## 2022-05-17 PROCEDURE — 36415 COLL VENOUS BLD VENIPUNCTURE: CPT

## 2022-05-17 PROCEDURE — 99233 SBSQ HOSP IP/OBS HIGH 50: CPT | Performed by: INTERNAL MEDICINE

## 2022-05-17 PROCEDURE — 6360000002 HC RX W HCPCS: Performed by: INTERNAL MEDICINE

## 2022-05-17 PROCEDURE — 80048 BASIC METABOLIC PNL TOTAL CA: CPT

## 2022-05-17 RX ORDER — AMOXICILLIN AND CLAVULANATE POTASSIUM 875; 125 MG/1; MG/1
1 TABLET, FILM COATED ORAL 2 TIMES DAILY
Qty: 8 TABLET | Refills: 0 | Status: SHIPPED | OUTPATIENT
Start: 2022-05-17 | End: 2022-05-21

## 2022-05-17 RX ORDER — LIDOCAINE 4 G/G
1 PATCH TOPICAL DAILY
Qty: 1 BOX | Refills: 1 | Status: SHIPPED | OUTPATIENT
Start: 2022-05-18

## 2022-05-17 RX ORDER — MECOBALAMIN 5000 MCG
5 TABLET,DISINTEGRATING ORAL NIGHTLY PRN
Qty: 30 TABLET | Refills: 1 | COMMUNITY
Start: 2022-05-17

## 2022-05-17 RX ADMIN — ASPIRIN 81 MG: 81 TABLET, COATED ORAL at 09:03

## 2022-05-17 RX ADMIN — SODIUM CHLORIDE, PRESERVATIVE FREE 10 ML: 5 INJECTION INTRAVENOUS at 22:12

## 2022-05-17 RX ADMIN — CEFEPIME 1000 MG: 1 INJECTION, POWDER, FOR SOLUTION INTRAMUSCULAR; INTRAVENOUS at 13:39

## 2022-05-17 RX ADMIN — CEFEPIME 1000 MG: 1 INJECTION, POWDER, FOR SOLUTION INTRAMUSCULAR; INTRAVENOUS at 02:42

## 2022-05-17 RX ADMIN — Medication 18.04 UNITS/KG/HR: at 13:36

## 2022-05-17 RX ADMIN — APIXABAN 10 MG: 5 TABLET, FILM COATED ORAL at 22:12

## 2022-05-17 RX ADMIN — Medication 5 MG: at 22:12

## 2022-05-17 RX ADMIN — APIXABAN 10 MG: 5 TABLET, FILM COATED ORAL at 15:10

## 2022-05-17 RX ADMIN — HEPARIN SODIUM 3440 UNITS: 1000 INJECTION INTRAVENOUS; SUBCUTANEOUS at 00:55

## 2022-05-17 RX ADMIN — AZITHROMYCIN MONOHYDRATE 500 MG: 500 INJECTION, POWDER, LYOPHILIZED, FOR SOLUTION INTRAVENOUS at 01:40

## 2022-05-17 ASSESSMENT — PAIN SCALES - GENERAL
PAINLEVEL_OUTOF10: 5
PAINLEVEL_OUTOF10: 0

## 2022-05-17 ASSESSMENT — PAIN DESCRIPTION - ORIENTATION
ORIENTATION: MID
ORIENTATION: MID

## 2022-05-17 ASSESSMENT — PAIN DESCRIPTION - DESCRIPTORS
DESCRIPTORS: SORE
DESCRIPTORS: SORE

## 2022-05-17 ASSESSMENT — PAIN DESCRIPTION - LOCATION
LOCATION: BACK

## 2022-05-17 ASSESSMENT — PAIN DESCRIPTION - PAIN TYPE: TYPE: ACUTE PAIN

## 2022-05-17 ASSESSMENT — PAIN DESCRIPTION - FREQUENCY: FREQUENCY: INTERMITTENT

## 2022-05-17 NOTE — CARE COORDINATION
CTN spoke to intake at [address]. They are active with patient and will pull orders from epic.  Electronically signed by Denita Sahni LPN on 2/48/5185 at 6:52 PM

## 2022-05-17 NOTE — PROGRESS NOTES
Hospitalist Progress Note      PCP: Chioma Kimbrough MD    Date of Admission: 5/12/2022    Chief Complaint on Admission: left sided chest pain        80 y.o. male who presents from his assisted living facility with complaints of left-sided chest pain. Pain is located in left upper chest with no radiation. Pain gets worse with deep breathing and cough. Patient is unable to describe his symptoms fully. States that pain got better when EMS gave him sublingual nitroglycerin. Denies associated shortness of breath, nausea, vomiting, diaphoresis.     Patient is a poor historian given some cognitive impairment at baseline as well as hearing impairment.     According to the patient's son he has been somewhat confused over the past 2 days and had a nonproductive mild cough. SUBJECTIVE. OBJECTIVE:     Admitted from assisted living with chest pain. His troponins was negative serially. Found to have DVT in lower extremities, elevated D-dimer, started on heparin drip, antibiotics for possible pneumonia. Had a sitter due to intermittent confusion several days ago. Nil further    Denies new complaints apart from chronic low back pain      Allergies  Sulfamethoxazole-trimethoprim    Medications : Reviewed      Exam:   Vitals BP (!) 149/88   Pulse 77   Temp 97.5 °F (36.4 °C) (Oral)   Resp 18   Ht 5' 8\" (1.727 m)   Wt 191 lb 5.8 oz (86.8 kg)   SpO2 97%   BMI 29.10 kg/m²     General appearance: No apparent distress, appears stated age and cooperative. Lungs: diminished breath sounds  Heart: Regular rate and rhythm with Normal S1/S2 without  murmurs, rubs or gallops, point of maximum impulse non-displaced  Abdomen: Soft, non-tender or non-distended without rigidity or guarding and positive bowel sounds all four quadrants. Extremities: No clubbing, cyanosis, 1+ pitting leg edema bilaterally.     Skin: LE venous stasis dermatitis  Neurologic: Alert and oriented X 3,   grossly non-focal.  Mental status: Alert, oriented, thought content appropriate. Data    Recent Labs     05/15/22  0746 05/17/22  0748   WBC 6.3 6.2   HGB 12.4* 12.5*   HCT 36.8* 37.3*    204      Recent Labs     05/15/22  0746 05/16/22  0656 05/17/22  0748    142 139   K 4.1 4.7 4.5    107 105   CO2 26 26 25   BUN 21* 28* 28*   CREATININE 1.8* 1.8* 1.7*     No results for input(s): AST, ALT, ALB, BILIDIR, BILITOT, ALKPHOS in the last 72 hours. No results for input(s): INR in the last 72 hours. No results for input(s): CKTOTAL, CKMB, CKMBINDEX, TROPONINI in the last 72 hours. Consults:     IP CONSULT TO HOSPITALIST  IP CONSULT TO PALLIATIVE CARE  IP CONSULT TO NEPHROLOGY    Active Hospital Problems    Diagnosis Date Noted    HCAP (healthcare-associated pneumonia) [J18.9] 05/13/2022     Priority: Medium         ASSESSMENT AND PLAN        #Left-sided chest pain  Probable PE  Ruled out for Acute MI  EKG is nonischemic. Has bilateral proximal DVT  History of prostate cancer: F/u with urology  Appear did not get a CTA thorax done, as likely was felt will not change mgt  ECHO pending. Hearin gtt--> OAC in AM  - Repeat CBC in AM        #Acute DVT  Subacute DVT  - Has bilateral proximal DVTs, acute and subacute  - Started on heparin drip  - Transition to [address] [place] [address] Road      #Pulmonary airspace disease  Chest x-ray with left lower lobe consolidation  CT chest:   chronic left lower lobe airspace disease with subpulmonic small effusion and basilar atelectasis residing in the same location of previous lower lobe airspace disease in the anterobasal segment, lateral basal segment posterior basal    segment.    - Was treated with cefepime, azithromycin for possible pneumonia  - MRSA screen negative  - PRoCal: was 0.04  - Repeat PCT in AM: - low threshold to DC antibx, or transition to PO antibx to complete empiric course  - Repeat CBCwith diff in AM        #CKD stage 3 of unilateral kidney: POA  - Cr appear baseline around 2 from outside labs: AJ; ruled out  - Avoid nephrotoxins      #History of prostate cancer  Monitor for urinary retention        DVT Prophylaxis: heparin  Diet: ADULT DIET; Regular  Code Status: DNR-CCA    PT/OT Eval Status: noted    Disposition - inpt  Transition to [address] [place] [address]  DC to o/p F/u ; Home PT/OT   Pall Med following.  Son Timmy England, confirms that they want pt to be a [facility]  Called son to discuss care and discharge plan: Chary Addison MD

## 2022-05-17 NOTE — CARE COORDINATION
SW following for discharge disposition. Pt has a discharge order. SW called Dariana Morris, spoke with [facility] who informed this SW that pt is Independent Living, NOT assisted living. Pt does get home health care with [facility]. SW attempted to meet with pt, he did confirm that he lives alone, but pt is very hard of hearing and could not hear all of the questions this SW was asking. SW called pt's son Polo Christine ([phone]) to discuss discharge plan and to see if he can provide transport as pt does not meet for stretcher. SW awaiting return call. Joan Álvarez, MSW  [phone]    1700: SW spoke with son Polo Christine to update that pt has discharge order. Son states he feels pt is not ready to discharge and wants the pt to be reassessed by therapy. SW voiced understanding and updated staff. Pt will not discharge today.   Joan Álvarez, MSW

## 2022-05-17 NOTE — DISCHARGE SUMMARY
Hospital Discharge Summary    Patient's PCP: Chioma Kimbrough MD  Admit Date: 5/12/2022   Discharge Date: 5/18/2022    Admitting Physician: Dr. Claudine Bloch, MD  Discharge Physician: Dr. Loulou Dumont MD   Consults: nephrology    HPI: 80 y. o. male who presents from his assisted living facility with complaints of left-sided chest pain.       Pain is located in left upper chest with no radiation.  Pain gets worse with deep breathing and cough. Patient is unable to describe his symptoms fully.  States that pain got better when EMS gave him sublingual nitroglycerin.  Denies associated shortness of breath, nausea, vomiting, diaphoresis.     Patient is a poor historian given some cognitive impairment at baseline as well as hearing impairment.     According to the patient's son he has been somewhat confused over the past 2 days and had a nonproductive mild cough. Brief hospital course:  Given the concern of the patients presentation and the concern of the possible multi-factorial etiology contributing to patients symptomatology. Patient was admitted and evaluated and found to have:         Discharge Diagnoses:       #Left-sided chest pain  Probable PE  Ruled out for Acute MI  EKG is nonischemic. Has bilateral proximal DVT  Appear did not get a CTA thorax done, as likely was felt will not change mgt  ECHO looks ok  Appears hx of COVID in 1/2022  Hx of nephrectomy;  History of prostate cancer: Consider F/u with urology if still question as to risk factor for VTE  Hearin gtt--> OAC in AM  F/u with PCP           #Acute DVT  Subacute DVT  - Has bilateral proximal DVTs, acute and subacute  - Started on heparin drip  [place] [facility]  -  F/u with PCP          #Pulmonary airspace disease  Chest x-ray with left lower lobe consolidation  CT chest:   chronic left lower lobe airspace disease with subpulmonic small effusion and basilar atelectasis residing in the same location of previous lower lobe airspace disease in the anterobasal segment, lateral basal segment posterior basal    segment. - Was treated with cefepime, azithromycin for possible pneumonia  - Of note, appears had COVID in 1/2022  - MRSA screen negative  - PRoCal: was 0.04  - Transition to PO antibx to complete empiric course          #CKD stage 3 of unilateral kidney: POA  - Cr appear baseline around 2 from outside labs: AJ; ruled out  - F/u with neophrology          #History of prostate cancer  F/u o/p                    Physical Exam: BP (!) 149/88   Pulse 77   Temp 97.5 °F (36.4 °C) (Oral)   Resp 18   Ht 5' 8\" (1.727 m)   Wt 191 lb 5.8 oz (86.8 kg)   SpO2 97%   BMI 29.10 kg/m²     No results for input(s): POCGLU in the last 72 hours. General appearance: No apparent distress, appears stated age and cooperative. Lungs: diminished breath sounds  Heart: Regular rate and rhythm with Normal S1/S2 without  murmurs, rubs or gallops, point of maximum impulse non-displaced  Abdomen: Soft, non-tender or non-distended without rigidity or guarding and positive bowel sounds all four quadrants. Extremities: No clubbing, cyanosis, 1+ pitting leg edema bilaterally. Skin: LE venous stasis dermatitis  Neurologic: Alert and oriented X 3,   grossly non-focal.  Mental status: Alert, oriented, thought content appropriate. LABS:  Recent Labs     05/17/22  0748   WBC 6.2   HGB 12.5*         Recent Labs     05/17/22  0748      K 4.5      CO2 25   BUN 28*   CREATININE 1.7*   GLUCOSE 100*     No results for input(s): INR in the last 72 hours.         Discharge Medications:  Discharge Medication List as of 5/18/2022  4:56 PM           Details   lidocaine 4 % external patch Place 1 patch onto the skin daily, TransDERmal, DAILY Starting Wed 5/18/2022, Disp-1 box, R-1, Print      melatonin 5 MG TBDP disintegrating tablet Take 1 tablet by mouth nightly as needed (insomnia), Disp-30 tablet, R-1OTC      apixaban starter pack (ELIQUIS DVT/PE STARTER PACK) 5 MG TBPK tablet Take 1 tablet by mouth See Admin Instructions, Disp-74 tablet, R-0Normal      amoxicillin-clavulanate (AUGMENTIN) 875-125 MG per tablet Take 1 tablet by mouth 2 times daily for 4 days, Disp-8 tablet, R-0Normal              Details   clobetasol (TEMOVATE) 0.05 % cream Apply topically 2 times daily Apply topically 2 times daily Monday-Friday to R Lower Leg Wound, Topical, 2 TIMES DAILY, Historical Med      diclofenac sodium (VOLTAREN) 1 % GEL Apply 4 g topically 2 times daily, Topical, 2 TIMES DAILY, Historical Med      Multiple Vitamins-Minerals (MULTI VITAMIN/MINERALS) TABS Take  by mouth. Activity: activity as tolerated  Diet: regular diet  Wound Care: none needed    Disposition: home  Discharged Condition: Stable  Follow Up: Primary Care Physician in one week    Total time spent on discharge, finalizing medications, referrals and arranging outpatient follow up was more than 30 minutes    Thank you Dr. Radha Chen MD for the opportunity to be involved in this patients care. If you have any questions or concerns please feel free to contact me at [phone].

## 2022-05-17 NOTE — DISCHARGE INSTR - COC
Continuity of Care Form    Patient Name: Barbie Holman   :  5/3/1930  MRN:  [id number]    [facility] date:  2022  Discharge date:  ***    Code Status Order: DNR-CCA   Advance Directives:      Admitting Physician:  Linda Wynn MD  PCP: Lulú Linares MD    Discharging Nurse: [facility] Unit/Room#: 9696/6396-23  Discharging Unit Phone Number: ***    Emergency Contact:   Extended Emergency Contact Information  Primary Emergency Contact: Radha Ly [address]  Mobile Phone: [phone]  Relation: Child  Secondary Emergency Contact: Babar Gonzalez  Home Phone: [phone]  Relation: Child    Past Surgical History:  Past Surgical History:   Procedure Laterality Date    JOINT REPLACEMENT Right 2005    hip    KIDNEY SURGERY  2019    PROSTATE SURGERY         Immunization History:   Immunization History   Administered Date(s) Administered    COVID-19, Pfizer Purple top, DILUTE for use, 12+ yrs, 30mcg/0.3mL dose 2021, 2021, 10/19/2021    Influenza Virus Vaccine 10/28/2004, 2005, 2006, 10/31/2007, 10/27/2008, 2009, 10/08/2010, 2011, 10/13/2013    Influenza Whole 10/13/2013    Influenza, High Dose (Fluzone 65 yrs and older) 2012, 2013, 10/15/2014, 2015, 10/13/2016, 10/21/2017, 10/17/2018, 10/23/2019, 10/05/2020    Pneumococcal Conjugate 13-valent (Swwffaw17) 2015    Pneumococcal Polysaccharide (Mgzobfbbl60) 2005, 2014    Tdap (Boostrix, Adacel) 2012       Active Problems:  Patient Active Problem List   Diagnosis Code    DVT (deep venous thrombosis) ([facility] I82.409    Pulmonary embolism (HCC) I26.99    Cancer ([facility] C80.1    Chest pain R07.9    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 ([facility] L97.313    HCAP (healthcare-associated pneumonia) J18.9       Isolation/Infection:   Isolation            No Isolation          Patient Infection Status       Infection Onset Added Last Indicated Last Indicated By Review Planned Expiration Resolved Resolved By    None active    Resolved    MRSA  03/17/14 03/17/14 Isra Rogers RN   09/24/15 Isra Rogers RN    3/13/14            Nurse Assessment:  Last Vital Signs: BP (!) 149/88   Pulse 77   Temp 97.5 °F (36.4 °C) (Oral)   Resp 18   Ht 5' 8\" (1.727 m)   Wt 191 lb 5.8 oz (86.8 kg)   SpO2 97%   BMI 29.10 kg/m²     Last documented pain score (0-10 scale): Pain Level: 5  Last Weight:   Wt Readings from Last 1 Encounters:   05/14/22 191 lb 5.8 oz (86.8 kg)     Mental Status:  {IP PT MENTAL STATUS:20030}    IV Access:  { ROGELIO IV ACCESS:326770228}    Nursing Mobility/ADLs:  Walking   {CHP DME HTRY:158896855}  Transfer  {CHP DME EVNN:554491720}  Bathing  {CHP DME SXFR:631342425}  Dressing  {CHP DME IYAI:336286422}  Toileting  {CHP DME YXKY:135697674}  Feeding  {P DME COHW:801330270}  Med Admin  {P DME ZKKA:472875352}  Med Delivery   { ROGELIO MED Delivery:388804323}    Wound Care Documentation and Therapy:        Elimination:  Continence: Bowel: {YES / FZ:84495}  Bladder: {YES / IY:06190}  Urinary Catheter: {Urinary Catheter:810020990}   Colostomy/Ileostomy/Ileal Conduit: {YES / YV:19255}       Date of Last BM: ***    Intake/Output Summary (Last 24 hours) at 5/17/2022 1346  Last data filed at 5/17/2022 0910  Gross per 24 hour   Intake 90 ml   Output 2050 ml   Net -1960 ml     I/O last 3 completed shifts:   In: 18 [P.O.:330]  Out: 3000 [Urine:3000]    Safety Concerns:     [address] [employer] ROGELIO Safety Concerns:472223931}    Impairments/Disabilities:      [address] Impairments/Disabilities:763050227}    Nutrition Therapy:  Current Nutrition Therapy:   [address] Diet List:421529492}    Routes of Feeding: {CHP DME Other Feedings:470638370}  Liquids: {Slp liquid thickness:92186}  Daily Fluid Restriction: {CHP DME Yes amt example:839624418}  Last Modified Barium Swallow with Video (Video Swallowing Test): {Done Not Done ICVK:222739506}    Treatments at the Time of Hospital Discharge:   Respiratory

## 2022-05-17 NOTE — PROGRESS NOTES
Office: [phone]       Fax: [phone]      Nephrology Progress Note        Patient's Name: Adina Plata Date: 5/12/2022  Date of Visit: 5/17/2022    Reason for Consult:  AJ/CKD  Requesting Physician:  Sd Luu MD  PCP: Radha Chen MD    Chief Complaint:  chest pain      History of Present Illness:      Quoc Jay is a 80 y.o. male with PMHx of OA, hearing loss who was hospitalized on 5/12/2022 with complaints of chest pain  Pt is being treated for pneumonia   Unable to give much history  No hematuria, dysuria  NSAID use: none  IV contrast: None recent   Home meds reviewed     INTERVAL HISTORY    Feels better  Shortness of breath: No   UOP: Fair  Creat: stable     Past Medical History:   Diagnosis Date    Anxiety     Arthritis     Cancer ([address]     prostate    Cancer ([facility]     skin    History of blood clots     MRSA infection 03/13/2014    leg wound       Past Surgical History:   Procedure Laterality Date    JOINT REPLACEMENT Right 2005    hip    KIDNEY SURGERY  2019    PROSTATE SURGERY         History reviewed. No pertinent family history. reports that he quit smoking about 42 years ago. He has never used smokeless tobacco. He reports current alcohol use. He reports that he does not use drugs. Medications:    Allergies:  Sulfamethoxazole-trimethoprim    Scheduled Meds:   lidocaine  1 patch TransDERmal Daily    azithromycin  500 mg IntraVENous Q24H    cefepime  1,000 mg IntraVENous Q12H    melatonin  5 mg Oral Nightly    aspirin  81 mg Oral Daily    sodium chloride flush  5-40 mL IntraVENous 2 times per day     Continuous Infusions:   sodium chloride      heparin (PORCINE) Infusion 18 Units/kg/hr (05/17/22 0054)       Labs:  CBC:   Recent Labs     05/15/22  0746 05/17/22  0748   WBC 6.3 6.2   HGB 12.4* 12.5*   PLT 161 204     Ca/Mg/Phos:   Recent Labs     05/15/22  0746 05/16/22  0656 05/17/22  0748   CALCIUM 9.2 9.5 9.9     UA:No results for input(s): Harriet Rhein, GLUCOSEU, BILIRUBINUR, KETUA, SPECGRAV, BLOODU, PHUR, PROTEINU, UROBILINOGEN, NITRU, LEUKOCYTESUR, Yadi Loud in the last 72 hours. Urine Microscopic: No results for input(s): LABCAST, BACTERIA, COMU, HYALCAST, WBCUA, RBCUA, EPIU in the last 72 hours. Urine Chemistry: No results for input(s): Mare Spotted, PROTEINUR, NAUR in the last 72 hours. Objective:     Vitals: BP (!) 169/89   Pulse 74   Temp 98.2 °F (36.8 °C) (Oral)   Resp 18   Ht 5' 8\" (1.727 m)   Wt 191 lb 5.8 oz (86.8 kg)   SpO2 97%   BMI 29.10 kg/m²    Wt Readings from Last 3 Encounters:   05/14/22 191 lb 5.8 oz (86.8 kg)   09/17/21 189 lb 13.1 oz (86.1 kg)      24HR INTAKE/OUTPUT:      Intake/Output Summary (Last 24 hours) at 5/17/2022 1110  Last data filed at 5/17/2022 0910  Gross per 24 hour   Intake 330 ml   Output 2150 ml   Net -1820 ml     Constitutional:  awake, NAD  HEENT:  MMM, No icterus  Neck: no bruits, No JVD  Cardiovascular:  reg rhythm  Respiratory: CTA, no crackles  Abdomen:  +BS, soft, NT, ND  Ext: trace lower extremity edema    CNS: alert, no agitation, hard of hearing    IMAGING:  US RENAL COMPLETE   Final Result   IMPRESSION :      No hydronephrosis. Status post left nephrectomy. VL Extremity Venous Bilateral   Final Result      CT CHEST WO CONTRAST   Final Result      1. Evidence of chronic left lower lobe airspace disease with subpulmonic small effusion and basilar atelectasis residing in the same location of previous lower lobe airspace disease in the anterobasal segment, lateral basal segment posterior basal    segment. 2. Chronic lung disease in the periphery of the lingula which is slightly decreased from 2015   3. Stable subpleural benign appearing nodule anterior right middle lobe.       XR CHEST (2 VW)   Final Result      Left lower lobe consolidation likely representing pneumonia. Assessment :     1. JA on suspected CKD3  -Non-Oliguric  -Baseline creat: [facility] 2->1.7  -UA: none  -Volume: Hypervolemic  mild  -Electrolytes: No Dyskalemia    Recent Labs     05/15/22  0746 05/16/22  0656 05/17/22  0748   BUN 21* 28* 28*   CREATININE 1.8* 1.8* 1.7*     Recent Labs     05/15/22  0746 05/16/22  0656 05/17/22  0748    142 139   K 4.1 4.7 4.5   CO2 26 26 25         2. HTN  -Blood pressure at goal     BP Readings from Last 1 Encounters:   05/17/22 (!) 169/89       3. pneumonia       Plan:     - UA  - Antimicrobials per primary team   - monitor BP for now  - IVF for hemodynamic support only  - Monitor BMP    -Monitor I/O, UOP  -Maintain MAP>65  -Avoid nephrotoxin, if able. -Dose meds to current eGFR    Thank you for allowing us to participate in care of Jam Taylor . We will continue to follow. Feel free to contact me with any questions.       Carrillo Romo MD  5/17/2022    [facility] [address]  Office : [phone]  Fax :[phone]

## 2022-05-18 VITALS
TEMPERATURE: 98.1 F | HEIGHT: 68 IN | OXYGEN SATURATION: 96 % | RESPIRATION RATE: 16 BRPM | SYSTOLIC BLOOD PRESSURE: 163 MMHG | HEART RATE: 76 BPM | DIASTOLIC BLOOD PRESSURE: 84 MMHG | WEIGHT: 191.36 LBS | BODY MASS INDEX: 29 KG/M2

## 2022-05-18 LAB
ANION GAP SERPL CALCULATED.3IONS-SCNC: 11 MMOL/L (ref 3–16)
BUN BLDV-MCNC: 29 MG/DL (ref 7–20)
CALCIUM SERPL-MCNC: 10.2 MG/DL (ref 8.3–10.6)
CHLORIDE BLD-SCNC: 103 MMOL/L (ref 99–110)
CO2: 25 MMOL/L (ref 21–32)
CREAT SERPL-MCNC: 1.7 MG/DL (ref 0.8–1.3)
GFR AFRICAN AMERICAN: 46
GFR NON-AFRICAN AMERICAN: 38
GLUCOSE BLD-MCNC: 97 MG/DL (ref 70–99)
POTASSIUM REFLEX MAGNESIUM: 4.7 MMOL/L (ref 3.5–5.1)
SODIUM BLD-SCNC: 139 MMOL/L (ref 136–145)

## 2022-05-18 PROCEDURE — 6370000000 HC RX 637 (ALT 250 FOR IP): Performed by: INTERNAL MEDICINE

## 2022-05-18 PROCEDURE — 97530 THERAPEUTIC ACTIVITIES: CPT

## 2022-05-18 PROCEDURE — 97535 SELF CARE MNGMENT TRAINING: CPT

## 2022-05-18 PROCEDURE — 80048 BASIC METABOLIC PNL TOTAL CA: CPT

## 2022-05-18 PROCEDURE — 97162 PT EVAL MOD COMPLEX 30 MIN: CPT

## 2022-05-18 PROCEDURE — 97168 OT RE-EVAL EST PLAN CARE: CPT

## 2022-05-18 PROCEDURE — 97116 GAIT TRAINING THERAPY: CPT

## 2022-05-18 PROCEDURE — 99233 SBSQ HOSP IP/OBS HIGH 50: CPT | Performed by: INTERNAL MEDICINE

## 2022-05-18 PROCEDURE — 2580000003 HC RX 258: Performed by: INTERNAL MEDICINE

## 2022-05-18 PROCEDURE — 36415 COLL VENOUS BLD VENIPUNCTURE: CPT

## 2022-05-18 RX ADMIN — ASPIRIN 81 MG: 81 TABLET, COATED ORAL at 09:06

## 2022-05-18 RX ADMIN — ACETAMINOPHEN 650 MG: 325 TABLET ORAL at 02:51

## 2022-05-18 RX ADMIN — APIXABAN 10 MG: 5 TABLET, FILM COATED ORAL at 09:06

## 2022-05-18 RX ADMIN — SODIUM CHLORIDE, PRESERVATIVE FREE 10 ML: 5 INJECTION INTRAVENOUS at 09:07

## 2022-05-18 ASSESSMENT — PAIN DESCRIPTION - PAIN TYPE: TYPE: CHRONIC PAIN

## 2022-05-18 ASSESSMENT — PAIN DESCRIPTION - FREQUENCY: FREQUENCY: CONTINUOUS

## 2022-05-18 ASSESSMENT — PAIN DESCRIPTION - ONSET: ONSET: ON-GOING

## 2022-05-18 ASSESSMENT — PAIN - FUNCTIONAL ASSESSMENT: PAIN_FUNCTIONAL_ASSESSMENT: ACTIVITIES ARE NOT PREVENTED

## 2022-05-18 ASSESSMENT — PAIN SCALES - GENERAL
PAINLEVEL_OUTOF10: 3
PAINLEVEL_OUTOF10: 9
PAINLEVEL_OUTOF10: 0

## 2022-05-18 ASSESSMENT — PAIN DESCRIPTION - LOCATION: LOCATION: BACK

## 2022-05-18 ASSESSMENT — PAIN DESCRIPTION - DESCRIPTORS: DESCRIPTORS: ACHING

## 2022-05-18 ASSESSMENT — PAIN DESCRIPTION - ORIENTATION: ORIENTATION: MID

## 2022-05-18 NOTE — PROGRESS NOTES
Office: [phone]       Fax: [phone]      Nephrology Progress Note        Patient's Name: Mona Crump Date: 5/12/2022  Date of Visit: 5/18/2022    Reason for Consult:  AJ/CKD  Requesting Physician:  Brandie Roach MD  PCP: Corinne Reynoso MD    Chief Complaint:  chest pain      History of Present Illness:      Jaron Wyatt is a 80 y.o. male with PMHx of OA, hearing loss who was hospitalized on 5/12/2022 with complaints of chest pain  Pt is being treated for pneumonia   Unable to give much history  No hematuria, dysuria  NSAID use: none  IV contrast: None recent   Home meds reviewed     INTERVAL HISTORY    Feels better  Shortness of breath: No   UOP: Fair  Creat: stable     Past Medical History:   Diagnosis Date    Anxiety     Arthritis     Cancer ([address]     prostate    Cancer ([facility] [address]     skin    History of blood clots     MRSA infection 03/13/2014    leg wound       Past Surgical History:   Procedure Laterality Date    JOINT REPLACEMENT Right 2005    hip    KIDNEY SURGERY  2019    PROSTATE SURGERY         History reviewed. No pertinent family history. reports that he quit smoking about 42 years ago. He has never used smokeless tobacco. He reports current alcohol use. He reports that he does not use drugs. Medications:    Allergies:  Sulfamethoxazole-trimethoprim    Scheduled Meds:   apixaban  10 mg Oral BID    Followed by   [facility] ON 5/24/2022] apixaban  5 mg Oral BID    lidocaine  1 patch TransDERmal Daily    melatonin  5 mg Oral Nightly    aspirin  81 mg Oral Daily    sodium chloride flush  5-40 mL IntraVENous 2 times per day     Continuous Infusions:   sodium chloride         Labs:  CBC:   Recent Labs     05/17/22  0748   WBC 6.2   HGB 12.5*        Ca/Mg/Phos:   Recent Labs     05/16/22  0656 05/17/22  0748 CALCIUM 9.5 9.9     UA:No results for input(s): Wilson Ludin, GLUCOSEU, BILIRUBINUR, Michelle Shells, BLOODU, PHUR, PROTEINU, UROBILINOGEN, NITRU, LEUKOCYTESUR, [place] in the last 72 hours. Urine Microscopic: No results for input(s): LABCAST, BACTERIA, COMU, HYALCAST, WBCUA, RBCUA, EPIU in the last 72 hours. Urine Chemistry: No results for input(s): Davidson Hashimoto, PROTEINUR, NAUR in the last 72 hours. Objective:     Vitals: BP (!) 180/92   Pulse 69   Temp 97.7 °F (36.5 °C) (Oral)   Resp 16   Ht 5' 8\" (1.727 m)   Wt 191 lb 5.8 oz (86.8 kg)   SpO2 98%   BMI 29.10 kg/m²    Wt Readings from Last 3 Encounters:   05/14/22 191 lb 5.8 oz (86.8 kg)   09/17/21 189 lb 13.1 oz (86.1 kg)      24HR INTAKE/OUTPUT:      Intake/Output Summary (Last 24 hours) at 5/18/2022 0934  Last data filed at 5/18/2022 1582  Gross per 24 hour   Intake --   Output 1525 ml   Net -1525 ml     Constitutional:  awake, NAD  HEENT:  MMM, No icterus  Neck: no bruits, No JVD  Cardiovascular:  reg rhythm  Respiratory: CTA, no crackles  Abdomen:  +BS, soft, NT, ND  Ext: trace lower extremity edema    CNS: alert, no agitation, hard of hearing    IMAGING:  XR LUMBAR SPINE (2-3 VIEWS)   Final Result   1. Severe multilevel degenerative disc disease and facet arthropathy   2. No osteoblastic metastatic lesions identified            US RENAL COMPLETE   Final Result   IMPRESSION :      No hydronephrosis. Status post left nephrectomy. VL Extremity Venous Bilateral   Final Result      CT CHEST WO CONTRAST   Final Result      1. Evidence of chronic left lower lobe airspace disease with subpulmonic small effusion and basilar atelectasis residing in the same location of previous lower lobe airspace disease in the anterobasal segment, lateral basal segment posterior basal    segment. 2. Chronic lung disease in the periphery of the lingula which is slightly decreased from 2015   3.  Stable subpleural benign appearing nodule anterior right middle lobe. XR CHEST (2 VW)   Final Result      Left lower lobe consolidation likely representing pneumonia. Assessment :     1. AJ on suspected CKD3  -Non-Oliguric  -Baseline creat: [facility] 2->1.7  -UA: none  -Volume: Hypervolemic  mild  -Electrolytes: No Dyskalemia    Recent Labs     05/16/22  0656 05/17/22  0748   BUN 28* 28*   CREATININE 1.8* 1.7*     Recent Labs     05/16/22  0656 05/17/22  0748    139   K 4.7 4.5   CO2 26 25         2. HTN  -Blood pressure at goal     BP Readings from Last 1 Encounters:   05/18/22 (!) 180/92       3. pneumonia       Plan:     - UA  - Antimicrobials per primary team   - monitor BP for now  - IVF for hemodynamic support only  - Monitor BMP    -Monitor I/O, UOP  -Maintain MAP>65  -Avoid nephrotoxin, if able. -Dose meds to current eGFR    Thank you for allowing us to participate in care of Magali Mayorga . We will continue to follow. Feel free to contact me with any questions.       Delroy Santacruz MD  5/18/2022    [facility] [address]  Office : [phone]  Fax :[phone]

## 2022-05-18 NOTE — PROGRESS NOTES
Pt alert and oriented x4 with confusion at times. /92. Pt is asymptomatic. Sara Lopezails Dr. Daniel Vargas and he stated to recheck the pt's BP in 30 mins. Pt denies chest pain, SOB, and nausea. Lidocain patch applied to L chest per MAR. Pt is showered and linens were changed. Will continue to monitor.

## 2022-05-18 NOTE — PLAN OF CARE
Problem: Safety - Adult  Goal: Free from fall injury  Outcome: Progressing     Problem: ABCDS Injury Assessment  Goal: Absence of physical injury  Outcome: Progressing     Problem: Discharge Planning  Goal: Discharge to home or other facility with appropriate resources  Outcome: Progressing

## 2022-05-18 NOTE — PROGRESS NOTES
Patient alert and oriented x 4, but forgetful at times. VSS. Patient up to sink to brush his teeth and then back to bed. Telemetry monitor on. Patient resting in bed with eyes closed at this time. Bedside table and call light within reach. Bed alarm on. Will continue to monitor.

## 2022-05-18 NOTE — PROGRESS NOTES
Pt was educated on using the call light when getting out of the bed or chair. Pt refuses to use the call light. I reinforced the reasons to use the call light since pt is a fall risk. Will continue to monitor.

## 2022-05-18 NOTE — PLAN OF CARE
Problem: Safety - Adult  Goal: Free from fall injury  5/18/2022 1020 by Cece Hoskins RN  Outcome: Progressing     Problem: Pain  Goal: Verbalizes/displays adequate comfort level or baseline comfort level  Outcome: Progressing     Problem: ABCDS Injury Assessment  Goal: Absence of physical injury  5/18/2022 1020 by Cece Hoskins RN  Outcome: Progressing

## 2022-05-18 NOTE — PROGRESS NOTES
Pt alert and oriented x4. Pt sitting up in chair with eyes open. Chair alarm is on, wheels locked, overhead table and call light within reach.

## 2022-05-18 NOTE — CARE COORDINATION
Case Management Assessment            Discharge Note                    Date / Time of Note: 5/18/2022 11:11 AM                  Discharge Note Completed by: MARGI Plunkett, TRISTAW    Patient Name: Toy Burleson   YOB: 1930  Diagnosis: AJ (acute kidney injury) ([facility] [address] [N17.9]  HCAP (healthcare-associated pneumonia) [J18.9]  Pneumonia of left lower lobe due to infectious organism [J18.9]  Chest pain, unspecified type [R07.9]   Date / Time: 5/12/2022 11:09 PM    Current PCP: Bhumi Sung MD  Clinic patient: No    Hospitalization in the last 30 days: No    Advance Directives:  Code Status: DNR-CCA  [facility] DNR form completed and on chart: Yes    Financial:  Payor: Raymon Grijalva / Plan: [place] / Product Type: *No Product type* /      Pharmacy:  No Pharmacies [id number] medications?: Potential Assistance Purchasing Medications: No  Assistance provided by Case Management: None at this time    Does patient want to participate in local refill/ meds to beds program?:      Meds To ClaimKit Rules:  1. Can ONLY be done Monday- Friday between 8:30am-5pm  2. Prescription(s) must be in pharmacy by 3pm to be filled same day  3. Copy of patient's insurance/ prescription drug card and patient face sheet must be sent along with the prescription(s)  4. Cost of Rx cannot be added to hospital bill. If financial assistance is needed, please contact unit  or ;  or  CANNOT provide pharmacy voucher for patients co-pays  5.  Patients can then  the prescription on their way out of the hospital at discharge, or pharmacy can deliver to the bedside if staff is available. (payment due at time of pick-up or delivery - cash, check, or card accepted)     Able to afford home medications/ co-pay costs: Yes    ADLS:  Current PT AM-PAC Score: 20 /24  Current OT AM-PAC Score: 20 /24      [facility]GE [facility] [address] PT/OT/RN     LOC at discharge: Not Applicable  ROGELIO Completed: Yes    Notification completed in HENS/PAS?:  Not Applicable    IMM Completed:   Not Indicated    Transportation:  Transportation PLAN for discharge: family   Mode of Transport: [address] ordered at discharge: Yes  [address]: Trent Gregg  Phone: [phone]  Fax: [phone]  Orders faxed: No - staff can pull from [address]:  Equipment obtained during hospitalization: NA    Home Oxygen and Respiratory Equipment:  Oxygen needed at discharge?: No    Dialysis:  Dialysis patient: No      Referrals made at [facility] for outpatient continued care:  Not Applicable    Additional CM Notes:  Pt will DC back to [address] today, Jose escobar Cap will transport. CM spoke with Trent Gregg to make aware of DC today with new PT/OT evals, and that pt will likely need aide services. Ina will coordinate with them to get aide services started after pt returns home. CM informed Jose escobar Cap of this; he is in agreement and appreciative of this. No other needs at this time. The Plan for Transition of Care is related to the following treatment goals of AJ (acute kidney injury) ([facility] [address] [N17.9]  HCAP (healthcare-associated pneumonia) [J18.9]  Pneumonia of left lower lobe due to infectious organism [J18.9]  Chest pain, unspecified type [R07.9]    The Patient and/or patient representative Philly Cosby and his family were provided with a choice of provider and agrees with the discharge plan Yes    Freedom of choice list was provided with basic dialogue that supports the patient's individualized plan of care/goals and shares the quality data associated with the providers.  Yes    Care Transitions patient: Yes    MARGI Newell, [facility]  Case Management Department  Ph: [phone]  Fax: [phone]

## 2022-05-18 NOTE — PROGRESS NOTES
Hospitalist Progress Note      PCP: Jeremiah Cabello MD    Date of Admission: 5/12/2022    Chief Complaint on Admission: left sided chest pain        80 y.o. male who presents from his assisted living facility with complaints of left-sided chest pain. Pain is located in left upper chest with no radiation. Pain gets worse with deep breathing and cough. Patient is unable to describe his symptoms fully. States that pain got better when EMS gave him sublingual nitroglycerin. Denies associated shortness of breath, nausea, vomiting, diaphoresis.     Patient is a poor historian given some cognitive impairment at baseline as well as hearing impairment.     According to the patient's son he has been somewhat confused over the past 2 days and had a nonproductive mild cough. SUBJECTIVE. OBJECTIVE:     Admitted from assisted living with chest pain. His troponins was negative serially. Found to have DVT in lower extremities, elevated D-dimer, started on heparin drip, antibiotics for possible pneumonia. Had a sitter due to intermittent confusion several days ago. Nil further    Denies new complaints apart from chronic low back pain      Allergies  Sulfamethoxazole-trimethoprim    Medications : Reviewed      Exam:   Vitals BP (!) 180/92   Pulse 69   Temp 97.7 °F (36.5 °C) (Oral)   Resp 16   Ht 5' 8\" (1.727 m)   Wt 191 lb 5.8 oz (86.8 kg)   SpO2 98%   BMI 29.10 kg/m²     General appearance: No apparent distress, appears stated age and cooperative. Lungs: diminished breath sounds  Heart: Regular rate and rhythm with Normal S1/S2 without  murmurs, rubs or gallops, point of maximum impulse non-displaced  Abdomen: Soft, non-tender or non-distended without rigidity or guarding and positive bowel sounds all four quadrants. Extremities: No clubbing, cyanosis, 1+ pitting leg edema bilaterally.     Skin: LE venous stasis dermatitis  Neurologic: Alert and oriented X 3,   grossly non-focal.  Mental status: Alert, oriented, thought content appropriate. Data    Recent Labs     05/17/22  0748   WBC 6.2   HGB 12.5*   HCT 37.3*         Recent Labs     05/16/22  0656 05/17/22  0748 05/18/22  0747    139 139   K 4.7 4.5 4.7    105 103   CO2 26 25 25   BUN 28* 28* 29*   CREATININE 1.8* 1.7* 1.7*     No results for input(s): AST, ALT, ALB, BILIDIR, BILITOT, ALKPHOS in the last 72 hours. No results for input(s): INR in the last 72 hours. No results for input(s): CKTOTAL, CKMB, CKMBINDEX, TROPONINI in the last 72 hours. Consults:     IP CONSULT TO HOSPITALIST  IP CONSULT TO PALLIATIVE CARE  IP CONSULT TO NEPHROLOGY  IP CONSULT TO HOME CARE NEEDS    Active Hospital Problems    Diagnosis Date Noted    HCAP (healthcare-associated pneumonia) [J18.9] 05/13/2022     Priority: Medium         ASSESSMENT AND PLAN        #Left-sided chest pain  Probable PE  Ruled out for Acute MI  EKG is nonischemic. Has bilateral proximal DVT  Appear did not get a CTA thorax done, as likely was felt will not change mgt  ECHO looks fairly ok  Risk factor for VTE include hx of COVID  History of prostate cancer: F/u with urology  Hearin gtt--> [address] [place] [address]       #Acute DVT  Subacute DVT  - Has bilateral proximal DVTs, acute and subacute  - Started on heparin drip; transitioned to [address] [place] [address] [employer]      #Pulmonary airspace disease  Possibly sec to COVID hx  Had COVID in 1/2022  Chest x-ray with left lower lobe consolidation  CT chest:   chronic left lower lobe airspace disease with subpulmonic small effusion and basilar atelectasis residing in the same location of previous lower lobe airspace disease in the anterobasal segment, lateral basal segment posterior basal    segment.    - Was treated with cefepime, azithromycin for possible pneumonia  - MRSA screen negative  - PRoCal: was 0.04  - Transitioned to PO antibx to complete empiric course, although suspicion for bacterial pneumonia remains low        #CKD stage 3 of unilateral kidney: POA  - Cr appear baseline around 2 from outside labs: AJ; ruled out  - Avoid nephrotoxins  - O/p F/u      #History of prostate cancer  O/P F/u      #Chronic back pain: POA  - X ray:   Severe multilevel degenerative disc disease and facet arthropathy   No osteoblastic metastatic lesions identified   - Multi-modal pain control strategy  - PT/OT  - O/p F/u          DVT Prophylaxis: TapMyBack  Diet: ADULT DIET; Regular  Code Status: DNR-CCA    PT/OT Eval Status: noted    Disposition - inpt  Transition to [facility]  PT/OT re-eval to guide dipo.  May need placement  Called son to discuss care and discharge plan: Lulu Lopes MD

## 2022-05-18 NOTE — PROGRESS NOTES
Pt was discharged with son to go back to [facility]. Pt was able to  prescriptions from pharmacy and was given a lidocaine paper prescription. Pt questions were all answered. Pt took all belongings with him. Pt was educated on side effects of blood thinners and to come to the hospital if there is a fall or unusual bruising. IV was removed which was intact with no bleeding at the site. Tele box was removed. negative

## 2022-05-18 NOTE — PROGRESS NOTES
Physical Therapy  Facility/Department: [address]  Physical Therapy Initial Assessment and Discharge    Name: Denys Hung  : 5/3/1930  MRN: [id number]  Date of Service: 2022    Discharge Recommendations:  Denys Hung scored a 20/24 on the AM-PAC short mobility form. Current research shows that an AM-PAC score of 18 or greater is typically associated with a discharge to the patient's home setting. Based on the patient's AM-PAC score and their current functional mobility deficits, it is recommended that the patient have 2-3 sessions per week of Physical Therapy at d/c to increase the patient's independence. At this time, this patient demonstrates the endurance and safety to discharge home . Please see assessment section for further patient specific details. PT Equipment Recommendations  Equipment Needed: No        Assessment   Assessment: Pt re-assessed for DC needs. Pt continues to demonstrate transfers and gait at/near functional baseline. Gait is slow, but fairly steady with use of rolling walker. Pt would benefit from continued home PT to increase strength and endurance back to baseline. Recommend increased assist from staff as needed upon return to [facility]. Decision Making: Medium Complexity  Requires PT Follow-Up: No     Plan   Plan: Discharge with evaluation only    Safety Devices  Type of Devices: Chair alarm in place,Nurse notified,Telesitter in use,Call light within reach,Left in chair    Restraints  Restraints Initially in Place: No     Restrictions  Position Activity Restriction  Other position/activity restrictions: Up as tolerated     Subjective   Chart Reviewed: Yes  Additional Pertinent Hx: 81 yo admitted 22 for AJ/PNA/DVT. CXR/chest CT positive PNA; B LE doppler positive acute on chronic L DVT; echo pending.  Pmhx: Anxiety, Arthritis, Cancer ([facility] [address] Cancer ([facility] [address] History of blood clots  Diagnosis: AJ/PNA/DVT    Subjective  Subjective: Pt found sitting up in chair eating breakfast.  Pt had a shower this morning with assist from PCAs. Pt agreeable for PT. Pt reports he has not had much activity by nursing staff since last PT eval on 5/15. Pt denies pain. Social/Functional History  Lives With: Alone  Type of Home:  (Independent Living at [facility])  Home Layout: One level  Home Access: Level entry  Bathroom Shower/Tub: Walk-in shower  Bathroom Toilet: Handicap height  Bathroom Equipment: Grab bars in shower,Grab bars around toilet,Built-in shower seat,Hand-held shower  Bathroom Accessibility: \[facility]\""  Has the patient had two or more falls in the past year or any fall with injury in the past year?: Yes (\"I seem to be subject to fall\")  ADL Assistance: [address]: Needs assistance (facility does cooking and cleaning)  Ambulation Assistance: Independent (uses a rollator)  Transfer Assistance: Independent  Active : No  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[address].: enjoys spending his time with his dog, [place]  IADL Comments: Has a dog - lets his dog in/out of his patio door (fenced area)  Additional Comments: makes breakfast/lunch in room (micro meals) - goes to dining room for 1 meal per day (\"not too far\")    Vision/Hearing  Vision Exceptions: Wears glasses for reading  Hearing Exceptions: Hard of hearing/hearing concerns      Cognition   Orientation  Orientation Level: Oriented to person;Disoriented to time;Oriented to situation;Disoriented to situation     Objective   Gross Assessment  AROM: Within functional limits  Strength: Within functional limits      Bed mobility  Supine to Sit: Independent (bed flat, no rail. Increased effort)  Sit to Supine: Independent (bed flat, no rail.   Increased effort)     Transfers  Sit to Stand: Stand by assistance (from chair and EOB to walker)  Stand to sit: Stand by assistance     Ambulation  Device: Rolling Walker  Assistance: Stand by assistance  Gait Deviations: Slow Regina;Decreased step length;Decreased step height  Distance: 250ft     Balance  Sitting - Static: Good  Sitting - Dynamic: Good  Standing - Static: Fair  Standing - Dynamic: Fair (using rolling walker for mobility)     AM-PAC Score  AM-PAC Inpatient Mobility Raw Score : 20 (05/18/22 1055)  AM-PAC Inpatient T-Scale Score : 47.67 (05/18/22 1055)  Mobility Inpatient CMS 0-100% Score: 35.83 (05/18/22 1055)  Mobility Inpatient CMS G-Code Modifier : CJ (05/18/22 1055)    Education  Patient Education  Education Provided: Role of Therapy  Education Method: Verbal  Barriers to Learning: Cognition  Education Outcome: Unable to verbalize      Therapy Time   Individual Concurrent Group Co-treatment   Time In 1010         Time Out 1050         Minutes 40         Timed Code Treatment Minutes:  25  Total Treatment Minutes:  Justyn Tejada PT

## 2022-05-18 NOTE — PROGRESS NOTES
Out patient pharmacy called about pt's medications costing twelve dollars. Pt stated he did not bring his wallet. Son, Tamela Bonilla was called, but he did not answer. I left a vm with the outpatient pharmacy number.

## 2022-05-18 NOTE — PROGRESS NOTES
Occupational Therapy  Facility/Department: [address]  Occupational Therapy Re-Evaluation and Treatment      Name: Gopi Mohr  : 5/3/1930  MRN: [id number]  Date of Service: 2022    Discharge Recommendations:  Gopi Mohr scored a 20/24 on the AM-PAC ADL Inpatient form. Current research shows that an AM-PAC score of 18 or greater is typically associated with a discharge to the patient's home setting. Based on the patient's AM-PAC score, and their current ADL deficits, it is recommended that the patient have 2-3 sessions per week of Occupational Therapy at d/c to increase the patient's independence. At this time, this patient demonstrates the endurance and safety to discharge home with home services and a follow up treatment frequency of 2-3x/wk. Please see assessment section for further patient specific details. HOME HEALTH CARE: LEVEL 1 STANDARD    - Initial home health evaluation to occur within 24-48 hours, in patient home   - Therapy to evaluate with goal of regaining prior level of functioning   - Therapy to evaluate if patient has [id number] [facility] needs for personal care      If patient discharges prior to next session this note will serve as a discharge summary. Please see below for the latest assessment towards goals. Patient Diagnosis(es): The primary encounter diagnosis was Pneumonia of left lower lobe due to infectious organism. Diagnoses of Chest pain, unspecified type and AJ (acute kidney injury) ([facility] [address] were also pertinent to this visit. Past Medical History:  has a past medical history of Anxiety, Arthritis, Cancer ([address] Cancer ([address] History of blood clots, and MRSA infection. Past Surgical History:  has a past surgical history that includes Prostate surgery; joint replacement (Right, 2005); and Kidney surgery (2019).     Treatment Diagnosis: impaired ADLs and functional mobility/transfers      Assessment   Performance deficits / Impairments: Decreased functional mobility ; Decreased ADL status  Assessment: Pt is a 79 y/o [place]. Pt is functioning at SBA for functional transfers/mobility (normally uses 4 wh walker; using 2 wh walker during hospital admission). Pt is a questionable historian - initial evaluation, pt reports he receives supervision w/ showers; today reports he showers independently (while standing). Pt is SBA for functional transfers/mobility using wh walker, and SBA for ADLs in standing. Pt did require some assist to thread feet into depends (may benefit from AE for LB ADLs). Pt is likely functioning near his baseline at this time. Recommend pt return home w/ increased assist prn (discussed recommendation for supervision w/ showers and recommend use of shower chair for increased safety). Pt will benefit from [facility] and will continue to follow during admission. Discussed w/ SW. Treatment Diagnosis: impaired ADLs and functional mobility/transfers  Prognosis: Good  Decision Making: Low Complexity  REQUIRES OT FOLLOW-UP: Yes  Activity Tolerance  Activity Tolerance: Patient Tolerated treatment well  Activity Tolerance Comments: tolerated ~5 minutes of standing for mobility in room/hallway        Plan   2-5 time per week    Restrictions  Position Activity Restriction  Other position/activity restrictions: Up as tolerated    Subjective   General  Chart Reviewed: Yes  Patient assessed for rehabilitation services?: Yes  Additional Pertinent Hx: 80 y.o. male presenting from assisted living with left-sided chest pain.  PMH of anxiety, arthritis, prostate cancer, cognitive impairment  Family / Caregiver Present: No  Referring Practitioner: CHIDI Acevedo CNP  Diagnosis: AJ    Subjective  Subjective: Pt supine in bed upon arrival and agreeable to OT/Pt treatment        Social/Functional History  Social/Functional History  Lives With: Alone  Type of Home:  (Independent Living at [facility])  Home Layout: One level  Home Access: Level entry  Bathroom Shower/Tub: Walk-in shower  Bathroom Toilet: Handicap height  Bathroom Equipment: Grab bars in shower,Grab bars around toilet,Built-in shower seat,Hand-held shower  Bathroom Accessibility: Accessible  Home Equipment: Alert Button  Has the patient had two or more falls in the past year or any fall with injury in the past year?: Yes (\"I seem to be subject to fall\")  ADL Assistance: [address]: Needs assistance (facility does cooking and cleaning)  Homemaking Responsibilities: No  Ambulation Assistance: Independent (uses a rollator)  Transfer Assistance: Independent  Active : No  Occupation: Retired  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, Shyam Bustos  IADL Comments: Has a dog - lets his dog in/out of his patio door (fenced area)  Additional Comments: makes breakfast/lunch in room (micro meals) - goes to dining room for 1 meal per day (\"not too far\")       Objective       Vision Exceptions: Wears glasses for reading  Hearing: Exceptions to [facility]  Hearing Exceptions: Hard of hearing/hearing concerns (pt requests mask be pulled down to read lips)            Safety Devices  Type of Devices: Chair alarm in place;Nurse notified;Telesitter in use;Call light within reach; Left in chair     Balance  Sitting: Intact (Mod I static; Supervision dynamic)  Standing: Intact (SBA)    Functional Mobility  Overall Level of Assistance: Stand-by assistance  Interventions: Verbal cues  Assistive Device: Walker, rolling  Stairs - Level of Assistance: Stand-by assistance  NOTE: typically uses 4 wh walker, which would be easier for navigation           ADL  LE Dressing: Minimal assistance (assist to thread R foot, SBA pullup in standing; increased time/effort; may benefit from AE for LB ADLs)  Additional Comments: per discussion w/ PCA, pt did shower seated this morning and required supervision           Bed mobility  Supine to Sit: Independent (bed flat, no rail. Increased effort)  Sit to Supine: Independent (bed flat, no rail. Increased effort)     Transfers  Sit to stand: Stand by assistance  Stand to sit: Stand by assistance        Cognition  Following Commands: Follows one step commands with increased time; Follows one step commands with repetition  Attention Span: Attends with cues to redirect  Memory: Decreased short term memory;Decreased recall of recent events  Cognition Comment: questionable historian - initial evaluation (5/15) reports he receives supervision w/ showers; today, pt states he does not require supervision. Pt seen by OT for eval and treat.  Treatment included: bed mobility, functional transfers/mobility, ADL, pt education                                                                                           AM-PAC Score        AM-PAC Inpatient Daily Activity Raw Score: 20 (05/18/22 1119)  AM-PAC Inpatient ADL T-Scale Score : 42.03 (05/18/22 1119)  ADL Inpatient CMS 0-100% Score: 38.32 (05/18/22 1119)  ADL Inpatient CMS G-Code Modifier : CJ (05/18/22 1119)    Goals  Short Term Goals  Time Frame for Short term goals: Discharge  Short Term Goal 1: toilet transfer w/ Supervision  Short Term Goal 2: LB dressing using AE prn, Supervision  Short Term Goal 3: functional mobility for item retrieval, Supervision  Patient Goals   Patient goals : wants to return to apartment w/ dog, [address] [facility] Time   Individual Concurrent Group Co-treatment   Time In 1015         Time Out 1108         Minutes 53           Timed Code Treatment Minutes:   38    Total Treatment Minutes:  [facility] OTR/L #1121

## 2022-05-18 NOTE — CARE COORDINATION
CM spoke with PT/OT to request new evals today, per son's request yesterday (due to concerns about pt struggling to ambulate). New PT/OT orders placed.           Electronically signed by MARGI Pringle LSW on 5/18/2022 at 9:39 AM

## 2023-01-27 ENCOUNTER — APPOINTMENT (OUTPATIENT)
Dept: CT IMAGING | Age: 88
DRG: 551 | End: 2023-01-27
Payer: MEDICARE

## 2023-01-27 ENCOUNTER — HOSPITAL ENCOUNTER (INPATIENT)
Age: 88
LOS: 9 days | Discharge: NURSING, REHAB OR LONG-TERM CARE | DRG: 551 | End: 2023-02-07
Attending: STUDENT IN AN ORGANIZED HEALTH CARE EDUCATION/TRAINING PROGRAM | Admitting: INTERNAL MEDICINE
Payer: MEDICARE

## 2023-01-27 ENCOUNTER — APPOINTMENT (OUTPATIENT)
Dept: MRI IMAGING | Age: 88
DRG: 551 | End: 2023-01-27
Payer: MEDICARE

## 2023-01-27 ENCOUNTER — APPOINTMENT (OUTPATIENT)
Dept: GENERAL RADIOLOGY | Age: 88
DRG: 551 | End: 2023-01-27
Payer: MEDICARE

## 2023-01-27 DIAGNOSIS — L03.115 CELLULITIS OF RIGHT LOWER EXTREMITY: Primary | ICD-10-CM

## 2023-01-27 PROBLEM — M51.9 ACUTE LOW BACK PAIN WITH DISC SYMPTOMS, DURATION LESS THAN 6 WEEKS: Status: ACTIVE | Noted: 2023-01-27

## 2023-01-27 PROBLEM — I87.2 VENOUS STASIS DERMATITIS OF BOTH LOWER EXTREMITIES: Status: ACTIVE | Noted: 2023-01-27

## 2023-01-27 PROBLEM — R26.2 AMBULATORY DYSFUNCTION: Status: ACTIVE | Noted: 2023-01-27

## 2023-01-27 PROBLEM — M54.50 ACUTE LOW BACK PAIN WITH DISC SYMPTOMS, DURATION LESS THAN 6 WEEKS: Status: ACTIVE | Noted: 2023-01-27

## 2023-01-27 PROBLEM — Z79.01 CHRONIC ANTICOAGULATION: Status: ACTIVE | Noted: 2023-01-27

## 2023-01-27 PROBLEM — L03.116 LEFT LEG CELLULITIS: Status: ACTIVE | Noted: 2023-01-27

## 2023-01-27 PROBLEM — M54.50 ACUTE LOW BACK PAIN: Status: ACTIVE | Noted: 2023-01-27

## 2023-01-27 LAB
ANION GAP SERPL CALCULATED.3IONS-SCNC: 12 MMOL/L (ref 3–16)
BASOPHILS ABSOLUTE: 0 K/UL (ref 0–0.2)
BASOPHILS RELATIVE PERCENT: 0.8 %
BILIRUBIN URINE: NEGATIVE
BLOOD, URINE: NEGATIVE
BUN BLDV-MCNC: 33 MG/DL (ref 7–20)
CALCIUM SERPL-MCNC: 9.7 MG/DL (ref 8.3–10.6)
CHLORIDE BLD-SCNC: 104 MMOL/L (ref 99–110)
CLARITY: CLEAR
CO2: 23 MMOL/L (ref 21–32)
COLOR: YELLOW
CREAT SERPL-MCNC: 1.8 MG/DL (ref 0.8–1.3)
EKG ATRIAL RATE: 79 BPM
EKG DIAGNOSIS: NORMAL
EKG P AXIS: 84 DEGREES
EKG P-R INTERVAL: 152 MS
EKG Q-T INTERVAL: 388 MS
EKG QRS DURATION: 84 MS
EKG QTC CALCULATION (BAZETT): 444 MS
EKG R AXIS: 24 DEGREES
EKG T AXIS: 62 DEGREES
EKG VENTRICULAR RATE: 79 BPM
EOSINOPHILS ABSOLUTE: 0.2 K/UL (ref 0–0.6)
EOSINOPHILS RELATIVE PERCENT: 3 %
GFR SERPL CREATININE-BSD FRML MDRD: 35 ML/MIN/{1.73_M2}
GLUCOSE BLD-MCNC: 110 MG/DL (ref 70–99)
GLUCOSE URINE: NEGATIVE MG/DL
HCT VFR BLD CALC: 37.2 % (ref 40.5–52.5)
HEMOGLOBIN: 12.3 G/DL (ref 13.5–17.5)
KETONES, URINE: ABNORMAL MG/DL
LACTIC ACID: 0.7 MMOL/L (ref 0.4–2)
LEUKOCYTE ESTERASE, URINE: NEGATIVE
LYMPHOCYTES ABSOLUTE: 1.3 K/UL (ref 1–5.1)
LYMPHOCYTES RELATIVE PERCENT: 23.3 %
MCH RBC QN AUTO: 31.8 PG (ref 26–34)
MCHC RBC AUTO-ENTMCNC: 32.9 G/DL (ref 31–36)
MCV RBC AUTO: 96.7 FL (ref 80–100)
MICROSCOPIC EXAMINATION: ABNORMAL
MONOCYTES ABSOLUTE: 0.7 K/UL (ref 0–1.3)
MONOCYTES RELATIVE PERCENT: 13.7 %
NEUTROPHILS ABSOLUTE: 3.2 K/UL (ref 1.7–7.7)
NEUTROPHILS RELATIVE PERCENT: 59.2 %
NITRITE, URINE: NEGATIVE
PDW BLD-RTO: 14.3 % (ref 12.4–15.4)
PH UA: 6 (ref 5–8)
PLATELET # BLD: 226 K/UL (ref 135–450)
PMV BLD AUTO: 7.8 FL (ref 5–10.5)
POTASSIUM REFLEX MAGNESIUM: 4.6 MMOL/L (ref 3.5–5.1)
PROTEIN UA: NEGATIVE MG/DL
RBC # BLD: 3.85 M/UL (ref 4.2–5.9)
SODIUM BLD-SCNC: 139 MMOL/L (ref 136–145)
SPECIFIC GRAVITY UA: >=1.03 (ref 1–1.03)
URINE REFLEX TO CULTURE: ABNORMAL
URINE TYPE: ABNORMAL
UROBILINOGEN, URINE: 0.2 E.U./DL
WBC # BLD: 5.5 K/UL (ref 4–11)

## 2023-01-27 PROCEDURE — 6360000002 HC RX W HCPCS: Performed by: STUDENT IN AN ORGANIZED HEALTH CARE EDUCATION/TRAINING PROGRAM

## 2023-01-27 PROCEDURE — 96375 TX/PRO/DX INJ NEW DRUG ADDON: CPT

## 2023-01-27 PROCEDURE — 73590 X-RAY EXAM OF LOWER LEG: CPT

## 2023-01-27 PROCEDURE — 99285 EMERGENCY DEPT VISIT HI MDM: CPT

## 2023-01-27 PROCEDURE — G0378 HOSPITAL OBSERVATION PER HR: HCPCS

## 2023-01-27 PROCEDURE — 2580000003 HC RX 258: Performed by: STUDENT IN AN ORGANIZED HEALTH CARE EDUCATION/TRAINING PROGRAM

## 2023-01-27 PROCEDURE — 80048 BASIC METABOLIC PNL TOTAL CA: CPT

## 2023-01-27 PROCEDURE — 72148 MRI LUMBAR SPINE W/O DYE: CPT

## 2023-01-27 PROCEDURE — 96367 TX/PROPH/DG ADDL SEQ IV INF: CPT

## 2023-01-27 PROCEDURE — 2580000003 HC RX 258: Performed by: INTERNAL MEDICINE

## 2023-01-27 PROCEDURE — 96366 THER/PROPH/DIAG IV INF ADDON: CPT

## 2023-01-27 PROCEDURE — 96365 THER/PROPH/DIAG IV INF INIT: CPT

## 2023-01-27 PROCEDURE — 70450 CT HEAD/BRAIN W/O DYE: CPT

## 2023-01-27 PROCEDURE — 72131 CT LUMBAR SPINE W/O DYE: CPT

## 2023-01-27 PROCEDURE — 87040 BLOOD CULTURE FOR BACTERIA: CPT

## 2023-01-27 PROCEDURE — 93005 ELECTROCARDIOGRAM TRACING: CPT | Performed by: STUDENT IN AN ORGANIZED HEALTH CARE EDUCATION/TRAINING PROGRAM

## 2023-01-27 PROCEDURE — 96376 TX/PRO/DX INJ SAME DRUG ADON: CPT

## 2023-01-27 PROCEDURE — 6360000002 HC RX W HCPCS: Performed by: INTERNAL MEDICINE

## 2023-01-27 PROCEDURE — 83605 ASSAY OF LACTIC ACID: CPT

## 2023-01-27 PROCEDURE — 6370000000 HC RX 637 (ALT 250 FOR IP): Performed by: STUDENT IN AN ORGANIZED HEALTH CARE EDUCATION/TRAINING PROGRAM

## 2023-01-27 PROCEDURE — 85025 COMPLETE CBC W/AUTO DIFF WBC: CPT

## 2023-01-27 PROCEDURE — 81003 URINALYSIS AUTO W/O SCOPE: CPT

## 2023-01-27 RX ORDER — ACETAMINOPHEN 325 MG/1
650 TABLET ORAL EVERY 6 HOURS PRN
Status: DISCONTINUED | OUTPATIENT
Start: 2023-01-27 | End: 2023-02-07 | Stop reason: HOSPADM

## 2023-01-27 RX ORDER — POTASSIUM CHLORIDE 7.45 MG/ML
10 INJECTION INTRAVENOUS PRN
Status: DISCONTINUED | OUTPATIENT
Start: 2023-01-27 | End: 2023-02-07 | Stop reason: HOSPADM

## 2023-01-27 RX ORDER — ACETAMINOPHEN 325 MG/1
650 TABLET ORAL EVERY 6 HOURS
Status: DISCONTINUED | OUTPATIENT
Start: 2023-01-27 | End: 2023-02-02

## 2023-01-27 RX ORDER — ONDANSETRON 2 MG/ML
4 INJECTION INTRAMUSCULAR; INTRAVENOUS EVERY 6 HOURS PRN
Status: DISCONTINUED | OUTPATIENT
Start: 2023-01-27 | End: 2023-02-07 | Stop reason: HOSPADM

## 2023-01-27 RX ORDER — SODIUM CHLORIDE 0.9 % (FLUSH) 0.9 %
5-40 SYRINGE (ML) INJECTION PRN
Status: DISCONTINUED | OUTPATIENT
Start: 2023-01-27 | End: 2023-02-07 | Stop reason: HOSPADM

## 2023-01-27 RX ORDER — M-VIT,TX,IRON,MINS/CALC/FOLIC 27MG-0.4MG
TABLET ORAL DAILY
Status: DISCONTINUED | OUTPATIENT
Start: 2023-01-28 | End: 2023-02-07 | Stop reason: HOSPADM

## 2023-01-27 RX ORDER — TIZANIDINE 4 MG/1
4 TABLET ORAL EVERY 8 HOURS PRN
Status: DISCONTINUED | OUTPATIENT
Start: 2023-01-27 | End: 2023-01-30

## 2023-01-27 RX ORDER — ACETAMINOPHEN 500 MG
1000 TABLET ORAL ONCE
Status: COMPLETED | OUTPATIENT
Start: 2023-01-27 | End: 2023-01-27

## 2023-01-27 RX ORDER — SODIUM CHLORIDE 0.9 % (FLUSH) 0.9 %
5-40 SYRINGE (ML) INJECTION EVERY 12 HOURS SCHEDULED
Status: DISCONTINUED | OUTPATIENT
Start: 2023-01-27 | End: 2023-02-07 | Stop reason: HOSPADM

## 2023-01-27 RX ORDER — LIDOCAINE 4 G/G
1 PATCH TOPICAL DAILY
Status: DISCONTINUED | OUTPATIENT
Start: 2023-01-27 | End: 2023-02-07 | Stop reason: HOSPADM

## 2023-01-27 RX ORDER — POLYETHYLENE GLYCOL 3350 17 G/17G
17 POWDER, FOR SOLUTION ORAL DAILY PRN
Status: DISCONTINUED | OUTPATIENT
Start: 2023-01-27 | End: 2023-02-07 | Stop reason: HOSPADM

## 2023-01-27 RX ORDER — MAGNESIUM HYDROXIDE/ALUMINUM HYDROXICE/SIMETHICONE 120; 1200; 1200 MG/30ML; MG/30ML; MG/30ML
30 SUSPENSION ORAL EVERY 6 HOURS PRN
Status: DISCONTINUED | OUTPATIENT
Start: 2023-01-27 | End: 2023-02-07 | Stop reason: HOSPADM

## 2023-01-27 RX ORDER — TRAMADOL HYDROCHLORIDE 50 MG/1
50 TABLET ORAL EVERY 6 HOURS PRN
Status: DISCONTINUED | OUTPATIENT
Start: 2023-01-27 | End: 2023-01-29

## 2023-01-27 RX ORDER — MORPHINE SULFATE 2 MG/ML
2 INJECTION, SOLUTION INTRAMUSCULAR; INTRAVENOUS ONCE
Status: COMPLETED | OUTPATIENT
Start: 2023-01-27 | End: 2023-01-27

## 2023-01-27 RX ORDER — ACETAMINOPHEN 650 MG/1
650 SUPPOSITORY RECTAL EVERY 6 HOURS PRN
Status: DISCONTINUED | OUTPATIENT
Start: 2023-01-27 | End: 2023-02-07 | Stop reason: HOSPADM

## 2023-01-27 RX ORDER — TRAMADOL HYDROCHLORIDE 50 MG/1
100 TABLET ORAL EVERY 6 HOURS PRN
Status: DISCONTINUED | OUTPATIENT
Start: 2023-01-27 | End: 2023-01-29

## 2023-01-27 RX ORDER — SODIUM CHLORIDE 9 MG/ML
INJECTION, SOLUTION INTRAVENOUS CONTINUOUS
Status: ACTIVE | OUTPATIENT
Start: 2023-01-27 | End: 2023-01-28

## 2023-01-27 RX ORDER — SODIUM CHLORIDE 9 MG/ML
INJECTION, SOLUTION INTRAVENOUS PRN
Status: DISCONTINUED | OUTPATIENT
Start: 2023-01-27 | End: 2023-02-07 | Stop reason: HOSPADM

## 2023-01-27 RX ORDER — POTASSIUM CHLORIDE 20 MEQ/1
40 TABLET, EXTENDED RELEASE ORAL PRN
Status: DISCONTINUED | OUTPATIENT
Start: 2023-01-27 | End: 2023-02-07 | Stop reason: HOSPADM

## 2023-01-27 RX ORDER — SODIUM PHOSPHATE, DIBASIC AND SODIUM PHOSPHATE, MONOBASIC 7; 19 G/133ML; G/133ML
1 ENEMA RECTAL DAILY PRN
Status: DISCONTINUED | OUTPATIENT
Start: 2023-01-27 | End: 2023-01-29

## 2023-01-27 RX ORDER — MECOBALAMIN 5000 MCG
5 TABLET,DISINTEGRATING ORAL NIGHTLY PRN
Status: DISCONTINUED | OUTPATIENT
Start: 2023-01-27 | End: 2023-02-01

## 2023-01-27 RX ORDER — MAGNESIUM SULFATE IN WATER 40 MG/ML
2000 INJECTION, SOLUTION INTRAVENOUS PRN
Status: DISCONTINUED | OUTPATIENT
Start: 2023-01-27 | End: 2023-02-07 | Stop reason: HOSPADM

## 2023-01-27 RX ORDER — SENNA AND DOCUSATE SODIUM 50; 8.6 MG/1; MG/1
1 TABLET, FILM COATED ORAL 2 TIMES DAILY
Status: DISCONTINUED | OUTPATIENT
Start: 2023-01-27 | End: 2023-02-07 | Stop reason: HOSPADM

## 2023-01-27 RX ORDER — ONDANSETRON 4 MG/1
4 TABLET, ORALLY DISINTEGRATING ORAL EVERY 8 HOURS PRN
Status: DISCONTINUED | OUTPATIENT
Start: 2023-01-27 | End: 2023-02-07 | Stop reason: HOSPADM

## 2023-01-27 RX ADMIN — CEFAZOLIN 2000 MG: 2 INJECTION, POWDER, FOR SOLUTION INTRAMUSCULAR; INTRAVENOUS at 21:57

## 2023-01-27 RX ADMIN — CEFEPIME 2000 MG: 2 INJECTION, POWDER, FOR SOLUTION INTRAVENOUS at 19:14

## 2023-01-27 RX ADMIN — VANCOMYCIN HYDROCHLORIDE 1750 MG: 10 INJECTION, POWDER, LYOPHILIZED, FOR SOLUTION INTRAVENOUS at 19:46

## 2023-01-27 RX ADMIN — MORPHINE SULFATE 2 MG: 2 INJECTION, SOLUTION INTRAMUSCULAR; INTRAVENOUS at 18:09

## 2023-01-27 RX ADMIN — MORPHINE SULFATE 2 MG: 2 INJECTION, SOLUTION INTRAMUSCULAR; INTRAVENOUS at 16:56

## 2023-01-27 RX ADMIN — ACETAMINOPHEN 1000 MG: 500 TABLET ORAL at 18:02

## 2023-01-27 ASSESSMENT — PAIN DESCRIPTION - ORIENTATION
ORIENTATION: MID

## 2023-01-27 ASSESSMENT — PAIN DESCRIPTION - LOCATION
LOCATION: BACK

## 2023-01-27 ASSESSMENT — PAIN DESCRIPTION - DESCRIPTORS
DESCRIPTORS: SHARP
DESCRIPTORS: ACHING
DESCRIPTORS: SHARP

## 2023-01-27 ASSESSMENT — LIFESTYLE VARIABLES: HOW OFTEN DO YOU HAVE A DRINK CONTAINING ALCOHOL: MONTHLY OR LESS

## 2023-01-27 ASSESSMENT — PAIN SCALES - GENERAL
PAINLEVEL_OUTOF10: 9
PAINLEVEL_OUTOF10: 5
PAINLEVEL_OUTOF10: 10
PAINLEVEL_OUTOF10: 10
PAINLEVEL_OUTOF10: 8
PAINLEVEL_OUTOF10: 5

## 2023-01-27 ASSESSMENT — PAIN DESCRIPTION - PAIN TYPE: TYPE: ACUTE PAIN

## 2023-01-27 ASSESSMENT — PAIN - FUNCTIONAL ASSESSMENT: PAIN_FUNCTIONAL_ASSESSMENT: 0-10

## 2023-01-27 NOTE — ED TRIAGE NOTES
Pt reports to ED from [facility]. C/o back pain x several days. Mechanical fall 3 days ago. Denies hitting head. Per ems staff report increased swelling of LLE with weeping edema.

## 2023-01-27 NOTE — ED PROVIDER NOTES
ED Attending Attestation Note     Date of evaluation: 1/27/2023    This patient was seen by the resident. I have seen and examined the patient, agree with the workup, evaluation, management and diagnosis. The care plan has been discussed. I have reviewed the ECG and concur with the resident's interpretation. My assessment reveals gentleman complaining of back pain. He is anticoagulated. He endorses a fall 3 to 4 days ago. He also reports leg swelling and leg pain. On exam, he is awake alert conversant. He is GCS 15 although very hard of hearing which limits some of his replies to questions makes it very difficult to hold on more than a superficial conversation with him. He does follow commands in all 4 extremities and has no pronator drift. In the lower extremities he is barely able to be antigravity and certainly not more than 5 seconds in the right lower extremity hip flexion. In the left lower extremity he is able to sustain for at least 10 seconds at least antigravity. He has bilateral intact plantar flexion and dorsiflexion at the ankles for strength testing. He has intact sensation throughout both lower extremities. He has faintly palpable DP pulses bilaterally which are symmetric. The cap refill is intact in the legs appear warm and well perfused. There is bilateral swelling with some superficial overlying change which appears chronic and brawny in appearance to me although there is weeping from the right but not the left lower extremity. Plan for laboratory studies. I suspect that the patient is actually just volume overloaded and this is limiting his leg strength exam but he also endorses back pain which is of some concern. Plan for initial imaging with CT to evaluate for fragility fracture given his advanced age and complaint of back pain.      Lemuel Escalera MD  01/27/23 Ruth Ly

## 2023-01-27 NOTE — ED PROVIDER NOTES
[id number]          EM RESIDENT NOTE       Date of evaluation: 1/27/2023    Chief Complaint     Back Pain and Leg Swelling (Pt reports to ED from [facility]. C/o back pain x 2 days. Per ems staff also report LLE has increased swelling with weeping edema )      History of Present Illness     Carlin Randall is a 80 y.o. male who presents back pain and leg swelling. Patient reports 2 days of worsening lower back pain. States it is made difficult for him to get around as he normally would. Per report from EMS, SNF was concerned with lower extremity swelling with some weeping edema. Patient denies any chest pain or shortness of breath. He is currently anticoagulated. Initially, patient denies any falls, but on further questioning states he did have a recent fall. He does report a head strike. Denies loss of. He is unclear why he fell. .  No fevers. Nothing is made the back pain better or worse. Patient does report pain to his leg but is more concerned about his back pain. MEDICAL DECISION MAKING / ASSESSMENT / PLAN     INITIAL VITALS: BP: (!) 157/86, Temp: 97.5 °F (36.4 °C), Heart Rate: 77, Resp: 16, SpO2: 100 %    Carlin Randall is a 80 y.o. male presenting with lower back pain and lower extremity swelling. He is hemodynamically stable, in no acute distress, afebrile. He has tenderness to palpation of the lumbar spine as well as paraspinal muscles. His lower extremity strength exam is limited due to pain. He has erythema and swelling of the right lower extremity worse than the left. He has bilateral pitting edema. We will pursue broad work-up for etiology of fall and lower extremity swelling. Concern for cellulitis of the right lower extremity. We will also get CT head given fall and being on anticoagulation. Given that patient is still on anticoagulation, lower concern for possible clot. Will get CT lumbar spine. Will give morphine for pain.     Lab work unremarkable. EKG is nonischemic. CT lumbar spine with some spinal stenosis. CT head with no acute abnormality. Will cover with antibiotics for cellulitis. XR right lower extremity with no gas. Patient has been unable to void. With this and CT findings concern for possible cauda equina. Will get MRI lumbar spine. Etienne catheter placed for urine. MRI with no signs of cord compression. Discussed patient with hospitalist who accepts patient for admission. Patient is admitted in stable condition to internal medicine for cellulitis. .      Medical Decision Making  Amount and/or Complexity of Data Reviewed  Independent Historian: EMS  Labs: ordered. Radiology: ordered. ECG/medicine tests: ordered. Risk  OTC drugs. Prescription drug management. Decision regarding hospitalization. This patient was also evaluated by the attending physician. All care plans werediscussed and agreed upon. Clinical Impression     1. Cellulitis of right lower extremity        Disposition     PATIENT REFERRED TO:  MD Yumiko Muir . [address] [place] [address]  [phone]    Schedule an appointment as soon as possible for a visit in 2 week(s)       2 [address]  [phone]        DISCHARGE MEDICATIONS:  Current Discharge Medication List        START taking these medications    Details   apixaban (ELIQUIS) 2.5 MG TABS tablet Take 1 tablet by mouth in the morning and 1 tablet in the evening.   Qty: 60 tablet      amLODIPine (NORVASC) 5 MG tablet Take 1 tablet by mouth daily  Qty: 30 tablet, Refills: 3      amoxicillin-clavulanate (AUGMENTIN) 500-125 MG per tablet Take 1 tablet by mouth 2 times daily for 2 days  Qty: 4 tablet, Refills: 0      furosemide (LASIX) 20 MG tablet Take 1 tablet by mouth daily  Qty: 60 tablet, Refills: 3             DISPOSITION Admitted 01/27/2023 08:07:28 PM        Diagnostic Results and Other Data     RADIOLOGY:  FL MODIFIED BARIUM SWALLOW W VIDEO   Final Result   1. Oral and pharyngeal dysphagia without significant aspiration identified. XR CHEST PORTABLE   Final Result      Patchy consolidation in the left lung base. Correlate for pneumonia. Right lung is clear. Normal cardiac mediastinal silhouette. Scoliosis. VL Extremity Venous Bilateral   Final Result      CT THORACIC SPINE WO CONTRAST   Final Result      1. Mild anterior wedging of T6, T7, and T8 without acute fracture plane. The findings are consistent with remote compression fractures and are unchanged from the prior study. No evidence of an acute fracture or traumatic subluxation. 2.  Moderate multilevel degenerative disc disease without bony spinal stenosis. 3.  The shape scoliosis. 4.  Small bilateral pleural effusions. CT ABDOMEN PELVIS WO CONTRAST Additional Contrast? None   Final Result      1. Limited without IV contrast but no noncontrast CT evidence of malignancy in the abdomen or pelvis. 2.  Trace bilateral pleural effusions. 3.  Status post left nephrectomy. 4.  Small focus of gas in the bladder is likely iatrogenic. 5.  Small fat-containing epigastric hernia. 6.  Advanced vascular calcifications. MRI LUMBAR SPINE WO CONTRAST   Final Result   1. Moderate acquired stenosis at L4-5 related to combined discopathy and significant ligament flavum hypertrophy. This narrows the AP dimension of the thecal sac to approximately 6.7 mm.   2. Mild acquired narrowing of the central canal at L2-3 related to discopathy and ligament flavum hypertrophy   3. Mild left neural exit foraminal narrowing at L2-3, L4-5 related to discopathy and hyperostosis from facet arthrosis   4. Mild narrowing of the right neural extra foramina at L1-2 related to mostly posteriorly oriented endplate osteophyte at L1      CT Head W/O Contrast   Final Result   1. Mild atrophy. 2. Patchy areas of decreased white matter attenuation.   The findings are nonspecific, but most likely represent chronic small vessel ischemic change. 3.  No evidence of an acute intracranial process. CT LUMBAR SPINE WO CONTRAST   Final Result      1. No acute osseous abnormality. 2.  Degenerative spondylosis/disc disease resulting in severe central canal stenosis at L2-L3 and mild central canal stenosis at other lumbar spine levels. XR TIBIA FIBULA RIGHT (2 VIEWS)   Final Result   1. No acute fracture or dislocation. LABS:   Results for orders placed or performed during the hospital encounter of 01/27/23   Blood Culture 1    Specimen: Blood   Result Value Ref Range    Blood Culture, Routine No Growth after 4 days of incubation. Culture, Blood 2    Specimen: Blood   Result Value Ref Range    Culture, Blood 2 No Growth after 4 days of incubation. MRSA DNA Probe, Nasal    Specimen: Nares   Result Value Ref Range    MRSA SCREEN RT-PCR       Negative  MRSA DNA not detected.   Normal Range: Not detected     CBC with Auto Differential   Result Value Ref Range    WBC 5.5 4.0 - 11.0 K/uL    RBC 3.85 (L) 4.20 - 5.90 M/uL    Hemoglobin 12.3 (L) 13.5 - 17.5 g/dL    Hematocrit 37.2 (L) 40.5 - 52.5 %    MCV 96.7 80.0 - 100.0 fL    MCH 31.8 26.0 - 34.0 pg    MCHC 32.9 31.0 - 36.0 g/dL    RDW 14.3 12.4 - 15.4 %    Platelets 330 468 - 107 K/uL    MPV 7.8 5.0 - 10.5 fL    Neutrophils % 59.2 %    Lymphocytes % 23.3 %    Monocytes % 13.7 %    Eosinophils % 3.0 %    Basophils % 0.8 %    Neutrophils Absolute 3.2 1.7 - 7.7 K/uL    Lymphocytes Absolute 1.3 1.0 - 5.1 K/uL    Monocytes Absolute 0.7 0.0 - 1.3 K/uL    Eosinophils Absolute 0.2 0.0 - 0.6 K/uL    Basophils Absolute 0.0 0.0 - 0.2 K/uL   BMP w/ Reflex to MG   Result Value Ref Range    Sodium 139 136 - 145 mmol/L    Potassium reflex Magnesium 4.6 3.5 - 5.1 mmol/L    Chloride 104 99 - 110 mmol/L    CO2 23 21 - 32 mmol/L    Anion Gap 12 3 - 16    Glucose 110 (H) 70 - 99 mg/dL    BUN 33 (H) 7 - 20 mg/dL    Creatinine 1.8 (H) 0.8 - 1.3 mg/dL    Est, Glom Filt Rate 35 (A) >60    Calcium 9.7 8.3 - 10.6 mg/dL   Urinalysis with Reflex to Culture    Specimen: Urine   Result Value Ref Range    Color, UA Yellow Straw/Yellow    Clarity, UA Clear Clear    Glucose, Ur Negative Negative mg/dL    Bilirubin Urine Negative Negative    Ketones, Urine TRACE (A) Negative mg/dL    Specific Gravity, UA >=1.030 1.005 - 1.030    Blood, Urine Negative Negative    pH, UA 6.0 5.0 - 8.0    Protein, UA Negative Negative mg/dL    Urobilinogen, Urine 0.2 <2.0 E.U./dL    Nitrite, Urine Negative Negative    Leukocyte Esterase, Urine Negative Negative    Microscopic Examination Not Indicated     Urine Type Voided     Urine Reflex to Culture Not Indicated    Basic Metabolic Panel w/ Reflex to MG   Result Value Ref Range    Sodium 144 136 - 145 mmol/L    Potassium reflex Magnesium 4.6 3.5 - 5.1 mmol/L    Chloride 109 99 - 110 mmol/L    CO2 24 21 - 32 mmol/L    Anion Gap 11 3 - 16    Glucose 106 (H) 70 - 99 mg/dL    BUN 27 (H) 7 - 20 mg/dL    Creatinine 1.7 (H) 0.8 - 1.3 mg/dL    Est, Glom Filt Rate 37 (A) >60    Calcium 9.5 8.3 - 10.6 mg/dL   CBC with Auto Differential   Result Value Ref Range    WBC 5.8 4.0 - 11.0 K/uL    RBC 3.78 (L) 4.20 - 5.90 M/uL    Hemoglobin 12.2 (L) 13.5 - 17.5 g/dL    Hematocrit 36.8 (L) 40.5 - 52.5 %    MCV 97.4 80.0 - 100.0 fL    MCH 32.2 26.0 - 34.0 pg    MCHC 33.1 31.0 - 36.0 g/dL    RDW 14.1 12.4 - 15.4 %    Platelets 187 784 - 672 K/uL    MPV 7.7 5.0 - 10.5 fL    Neutrophils % 69.3 %    Lymphocytes % 17.9 %    Monocytes % 11.0 %    Eosinophils % 1.4 %    Basophils % 0.4 %    Neutrophils Absolute 4.1 1.7 - 7.7 K/uL    Lymphocytes Absolute 1.0 1.0 - 5.1 K/uL    Monocytes Absolute 0.6 0.0 - 1.3 K/uL    Eosinophils Absolute 0.1 0.0 - 0.6 K/uL    Basophils Absolute 0.0 0.0 - 0.2 K/uL   Lactic Acid   Result Value Ref Range    Lactic Acid 0.7 0.4 - 2.0 mmol/L   CBC with Auto Differential   Result Value Ref Range    WBC 7.2 4.0 - 11.0 K/uL RBC 4.09 (L) 4.20 - 5.90 M/uL    Hemoglobin 13.5 13.5 - 17.5 g/dL    Hematocrit 40.2 (L) 40.5 - 52.5 %    MCV 98.3 80.0 - 100.0 fL    MCH 32.9 26.0 - 34.0 pg    MCHC 33.5 31.0 - 36.0 g/dL    RDW 14.4 12.4 - 15.4 %    Platelets 188 956 - 102 K/uL    MPV 7.6 5.0 - 10.5 fL    Neutrophils % 76.4 %    Lymphocytes % 11.4 %    Monocytes % 11.1 %    Eosinophils % 0.7 %    Basophils % 0.4 %    Neutrophils Absolute 5.5 1.7 - 7.7 K/uL    Lymphocytes Absolute 0.8 (L) 1.0 - 5.1 K/uL    Monocytes Absolute 0.8 0.0 - 1.3 K/uL    Eosinophils Absolute 0.0 0.0 - 0.6 K/uL    Basophils Absolute 0.0 0.0 - 0.2 K/uL   Comprehensive Metabolic Panel w/ Reflex to MG   Result Value Ref Range    Sodium 140 136 - 145 mmol/L    Potassium reflex Magnesium 4.0 3.5 - 5.1 mmol/L    Chloride 104 99 - 110 mmol/L    CO2 19 (L) 21 - 32 mmol/L    Anion Gap 17 (H) 3 - 16    Glucose 107 (H) 70 - 99 mg/dL    BUN 23 (H) 7 - 20 mg/dL    Creatinine 1.8 (H) 0.8 - 1.3 mg/dL    Est, Glom Filt Rate 35 (A) >60    Calcium 9.9 8.3 - 10.6 mg/dL    Total Protein 7.5 6.4 - 8.2 g/dL    Albumin 4.2 3.4 - 5.0 g/dL    Albumin/Globulin Ratio 1.3 1.1 - 2.2    Total Bilirubin 0.5 0.0 - 1.0 mg/dL    Alkaline Phosphatase 81 40 - 129 U/L    ALT 17 10 - 40 U/L    AST 37 15 - 37 U/L   Brain Natriuretic Peptide   Result Value Ref Range    Pro-BNP 4,153 (H) 0 - 449 pg/mL   Renal Function Panel   Result Value Ref Range    Sodium 140 136 - 145 mmol/L    Potassium 4.2 3.5 - 5.1 mmol/L    Chloride 103 99 - 110 mmol/L    CO2 21 21 - 32 mmol/L    Anion Gap 16 3 - 16    Glucose 113 (H) 70 - 99 mg/dL    BUN 24 (H) 7 - 20 mg/dL    Creatinine 2.0 (H) 0.8 - 1.3 mg/dL    Est, Glom Filt Rate 31 (A) >60    Calcium 9.5 8.3 - 10.6 mg/dL    Phosphorus 2.6 2.5 - 4.9 mg/dL    Albumin 3.6 3.4 - 5.0 g/dL   CBC with Auto Differential   Result Value Ref Range    WBC 7.4 4.0 - 11.0 K/uL    RBC 3.81 (L) 4.20 - 5.90 M/uL    Hemoglobin 12.2 (L) 13.5 - 17.5 g/dL    Hematocrit 36.9 (L) 40.5 - 52.5 %    MCV 96.6 80.0 - 100.0 fL    MCH 32.0 26.0 - 34.0 pg    MCHC 33.2 31.0 - 36.0 g/dL    RDW 13.9 12.4 - 15.4 %    Platelets 975 067 - 118 K/uL    MPV 7.9 5.0 - 10.5 fL    Neutrophils % 78.6 %    Lymphocytes % 8.6 %    Monocytes % 12.3 %    Eosinophils % 0.2 %    Basophils % 0.3 %    Neutrophils Absolute 5.8 1.7 - 7.7 K/uL    Lymphocytes Absolute 0.6 (L) 1.0 - 5.1 K/uL    Monocytes Absolute 0.9 0.0 - 1.3 K/uL    Eosinophils Absolute 0.0 0.0 - 0.6 K/uL    Basophils Absolute 0.0 0.0 - 0.2 K/uL   Urinalysis with Microscopic   Result Value Ref Range    Color, UA Yellow Straw/Yellow    Clarity, UA Clear Clear    Glucose, Ur Negative Negative mg/dL    Bilirubin Urine Negative Negative    Ketones, Urine 40 (A) Negative mg/dL    Specific Gravity, UA 1.025 1.005 - 1.030    Blood, Urine LARGE (A) Negative    pH, UA 6.0 5.0 - 8.0    Protein, UA 30 (A) Negative mg/dL    Urobilinogen, Urine 0.2 <2.0 E.U./dL    Nitrite, Urine Negative Negative    Leukocyte Esterase, Urine Negative Negative    Microscopic Examination YES     Urine Type NotGiven     Mucus, UA Rare (A) None Seen /LPF    WBC, UA 0-2 0 - 5 /HPF    RBC, UA 21-50 (A) 0 - 4 /HPF    Bacteria, UA 1+ (A) None Seen /HPF   PTH, Intact   Result Value Ref Range    PTH 51.5 14.0 - 72.0 pg/mL   Vitamin D 25 Hydroxy   Result Value Ref Range    Vit D, 25-Hydroxy 25.8 (L) >=30 ng/mL   Electrophoresis Protein, Serum   Result Value Ref Range    Total Protein 6.5 6.4 - 8.2 g/dL    Albumin 3.0 (L) 3.1 - 4.9 g/dL    Alpha-1-Globulin 0.4 0.2 - 0.4 g/dL    Alpha-2-Globulin 0.9 0.4 - 1.1 g/dL    Beta Globulin 1.6 0.9 - 1.6 g/dL    Gamma Globulin 0.6 0.6 - 1.8 g/dL   Protein electrophoresis, urine   Result Value Ref Range    Protein, Ur 17.00 (H) <12 mg/dL    Protein, Ur 0.017 <0.012 g/dL   Protein / Creatinine Ratio, Urine   Result Value Ref Range    Protein, Ur 19.00 (H) <12 mg/dL    Creatinine, Ur 47.0 39.0 - 259.0 mg/dL    Protein/Creat Ratio 0.4 mg/dL   Renal Function Panel   Result Value Ref Range Sodium 140 136 - 145 mmol/L    Potassium 3.8 3.5 - 5.1 mmol/L    Chloride 104 99 - 110 mmol/L    CO2 23 21 - 32 mmol/L    Anion Gap 13 3 - 16    Glucose 128 (H) 70 - 99 mg/dL    BUN 35 (H) 7 - 20 mg/dL    Creatinine 2.3 (H) 0.8 - 1.3 mg/dL    Est, Glom Filt Rate 26 (A) >60    Calcium 9.6 8.3 - 10.6 mg/dL    Phosphorus 3.3 2.5 - 4.9 mg/dL    Albumin 3.4 3.4 - 5.0 g/dL   CBC with Auto Differential   Result Value Ref Range    WBC 7.6 4.0 - 11.0 K/uL    RBC 3.45 (L) 4.20 - 5.90 M/uL    Hemoglobin 11.2 (L) 13.5 - 17.5 g/dL    Hematocrit 33.4 (L) 40.5 - 52.5 %    MCV 96.7 80.0 - 100.0 fL    MCH 32.4 26.0 - 34.0 pg    MCHC 33.5 31.0 - 36.0 g/dL    RDW 14.0 12.4 - 15.4 %    Platelets 025 887 - 168 K/uL    MPV 7.9 5.0 - 10.5 fL    Neutrophils % 76.7 %    Lymphocytes % 9.0 %    Monocytes % 14.2 %    Eosinophils % 0.0 %    Basophils % 0.1 %    Neutrophils Absolute 5.9 1.7 - 7.7 K/uL    Lymphocytes Absolute 0.7 (L) 1.0 - 5.1 K/uL    Monocytes Absolute 1.1 0.0 - 1.3 K/uL    Eosinophils Absolute 0.0 0.0 - 0.6 K/uL    Basophils Absolute 0.0 0.0 - 0.2 K/uL   Renal Function Panel   Result Value Ref Range    Sodium 139 136 - 145 mmol/L    Potassium 3.5 3.5 - 5.1 mmol/L    Chloride 101 99 - 110 mmol/L    CO2 26 21 - 32 mmol/L    Anion Gap 12 3 - 16    Glucose 139 (H) 70 - 99 mg/dL    BUN 39 (H) 7 - 20 mg/dL    Creatinine 2.2 (H) 0.8 - 1.3 mg/dL    Est, Glom Filt Rate 27 (A) >60    Calcium 9.3 8.3 - 10.6 mg/dL    Phosphorus 2.4 (L) 2.5 - 4.9 mg/dL    Albumin 3.2 (L) 3.4 - 5.0 g/dL   CBC with Auto Differential   Result Value Ref Range    WBC 7.6 4.0 - 11.0 K/uL    RBC 3.79 (L) 4.20 - 5.90 M/uL    Hemoglobin 12.2 (L) 13.5 - 17.5 g/dL    Hematocrit 36.2 (L) 40.5 - 52.5 %    MCV 95.6 80.0 - 100.0 fL    MCH 32.1 26.0 - 34.0 pg    MCHC 33.6 31.0 - 36.0 g/dL    RDW 13.8 12.4 - 15.4 %    Platelets 111 368 - 549 K/uL    MPV 7.9 5.0 - 10.5 fL    Neutrophils % 80.9 %    Lymphocytes % 8.0 %    Monocytes % 10.6 %    Eosinophils % 0.3 % Basophils % 0.2 %    Neutrophils Absolute 6.1 1.7 - 7.7 K/uL    Lymphocytes Absolute 0.6 (L) 1.0 - 5.1 K/uL    Monocytes Absolute 0.8 0.0 - 1.3 K/uL    Eosinophils Absolute 0.0 0.0 - 0.6 K/uL    Basophils Absolute 0.0 0.0 - 0.2 K/uL   Renal Function Panel   Result Value Ref Range    Sodium 140 136 - 145 mmol/L    Potassium 3.7 3.5 - 5.1 mmol/L    Chloride 103 99 - 110 mmol/L    CO2 26 21 - 32 mmol/L    Anion Gap 11 3 - 16    Glucose 113 (H) 70 - 99 mg/dL    BUN 44 (H) 7 - 20 mg/dL    Creatinine 2.2 (H) 0.8 - 1.3 mg/dL    Est, Glom Filt Rate 27 (A) >60    Calcium 9.2 8.3 - 10.6 mg/dL    Phosphorus 2.6 2.5 - 4.9 mg/dL    Albumin 3.0 (L) 3.4 - 5.0 g/dL   Pathology Interpretation   Result Value Ref Range    SPE/THOMAS Interpretation REVIEWED    Path Interp Elec Urine   Result Value Ref Range    UR Electrophoresis Int REVIEWED    Renal Function Panel   Result Value Ref Range    Sodium 138 136 - 145 mmol/L    Potassium 3.4 (L) 3.5 - 5.1 mmol/L    Chloride 99 99 - 110 mmol/L    CO2 30 21 - 32 mmol/L    Anion Gap 9 3 - 16    Glucose 122 (H) 70 - 99 mg/dL    BUN 43 (H) 7 - 20 mg/dL    Creatinine 1.9 (H) 0.8 - 1.3 mg/dL    Est, Glom Filt Rate 33 (A) >60    Calcium 9.2 8.3 - 10.6 mg/dL    Phosphorus 2.4 (L) 2.5 - 4.9 mg/dL    Albumin 3.2 (L) 3.4 - 5.0 g/dL   Renal Function Panel   Result Value Ref Range    Sodium 135 (L) 136 - 145 mmol/L    Potassium 4.2 3.5 - 5.1 mmol/L    Chloride 102 99 - 110 mmol/L    CO2 20 (L) 21 - 32 mmol/L    Anion Gap 13 3 - 16    Glucose 129 (H) 70 - 99 mg/dL    BUN 38 (H) 7 - 20 mg/dL    Creatinine 1.7 (H) 0.8 - 1.3 mg/dL    Est, Glom Filt Rate 37 (A) >60    Calcium 8.8 8.3 - 10.6 mg/dL    Phosphorus 2.1 (L) 2.5 - 4.9 mg/dL    Albumin 2.7 (L) 3.4 - 5.0 g/dL   EKG 12 Lead   Result Value Ref Range    Ventricular Rate 79 BPM    Atrial Rate 79 BPM    P-R Interval 152 ms    QRS Duration 84 ms    Q-T Interval 388 ms    QTc Calculation (Bazett) 444 ms    P Axis 84 degrees    R Axis 24 degrees    T Axis 62 degrees    Diagnosis       EKG performed in ER and to be interpreted by ER physician. Confirmed by MD, ER ([id number]),  Chantel Tafoya ([id number]) on 1/27/2023 2:40:17 PM   Echo Complete   Result Value Ref Range    Left Ventricular Ejection Fraction 50     LVEF MODALITY ECHO      EKG   Interpreted in conjunction with emergencydepartment physician No att. providers found  Rhythm: normal sinus   Rate: normal  Axis: normal  Ectopy: none  Conduction: normal  ST Segments: no acute change  T Waves:no acute change  Q Waves: none  Clinical Impression: no acute changes  Comparison: When compared to previous EKG 5/13/2022, no significant change present    ED BEDSIDE ULTRASOUND:  No results found. RECENT VITALS:  BP: 126/76, Temp: 98 °F (36.7 °C), Heart Rate: 83,Resp: 16, SpO2: 94 %     Procedures     None    ED Course     Nursing Notes, Past Medical Hx, Past Surgical Hx, Social Hx, Allergies, and Family Hx were reviewed.          The patient was given the following medications:  Orders Placed This Encounter   Medications    morphine (PF) injection 2 mg    DISCONTD: vancomycin (VANCOCIN) 1,750 mg in sodium chloride 0.9 % 500 mL IVPB     Order Specific Question:   Antimicrobial Indications     Answer:   Skin and Soft Tissue Infection    cefepime (MAXIPIME) 2,000 mg in sodium chloride 0.9 % 50 mL IVPB (mini-bag)     Order Specific Question:   Antimicrobial Indications     Answer:   Skin and Soft Tissue Infection    lidocaine 4 % external patch 1 patch    acetaminophen (TYLENOL) tablet 1,000 mg    morphine (PF) injection 2 mg    sodium chloride flush 0.9 % injection 5-40 mL    sodium chloride flush 0.9 % injection 5-40 mL    0.9 % sodium chloride infusion    OR Linked Order Group     ondansetron (ZOFRAN-ODT) disintegrating tablet 4 mg     ondansetron (ZOFRAN) injection 4 mg    polyethylene glycol (GLYCOLAX) packet 17 g    OR Linked Order Group     acetaminophen (TYLENOL) tablet 650 mg     acetaminophen (TYLENOL) suppository 650 mg magnesium sulfate 2000 mg in 50 mL IVPB premix    OR Linked Order Group     potassium chloride (KLOR-CON M) extended release tablet 40 mEq     potassium bicarb-citric acid (EFFER-K) effervescent tablet 40 mEq     potassium chloride 10 mEq/100 mL IVPB (Peripheral Line)    sennosides-docusate sodium (SENOKOT-S) 8.6-50 MG tablet 1 tablet    DISCONTD: sodium phosphate (FLEET) rectal enema 1 enema    aluminum & magnesium hydroxide-simethicone (MAALOX) 200-200-20 MG/5ML suspension 30 mL    DISCONTD: ceFAZolin (ANCEF) 2,000 mg in sodium chloride 0.9 % 50 mL IVPB (mini-bag)     Order Specific Question:   Antimicrobial Indications     Answer:   Skin and Soft Tissue Infection     Order Specific Question:   Skin duration of therapy     Answer:   7 days    DISCONTD: acetaminophen (TYLENOL) tablet 650 mg    DISCONTD: traMADol (ULTRAM) tablet 50 mg    DISCONTD: traMADol (ULTRAM) tablet 100 mg    DISCONTD: tiZANidine (ZANAFLEX) tablet 4 mg    apixaban (ELIQUIS) tablet 2.5 mg     Order Specific Question:   Indication of Use     Answer:   Treatment-DVT/PE    DISCONTD: melatonin disintegrating tablet 5 mg    therapeutic multivitamin-minerals    0.9 % sodium chloride infusion    morphine (PF) injection 1 mg    morphine (PF) injection 1 mg    OLANZapine (ZYPREXA) injection 5 mg    DISCONTD: haloperidol lactate (HALDOL) injection 5 mg    DISCONTD: morphine (PF) injection 2 mg    enoxaparin (LOVENOX) injection 90 mg     Order Specific Question:   Indication of Use     Answer:   Treatment-DVT/PE    DISCONTD: HYDROmorphone (DILAUDID) injection 0.25 mg    perflutren lipid microspheres (DEFINITY) injection 1.5 mL    DISCONTD: oxyCODONE (ROXICODONE) immediate release tablet 5 mg    furosemide (LASIX) injection 20 mg    DISCONTD: furosemide (LASIX) injection 20 mg    DISCONTD: OLANZapine zydis (ZYPREXA) disintegrating tablet 5 mg    DISCONTD: OLANZapine zydis (ZYPREXA) disintegrating tablet 5 mg    DISCONTD: OLANZapine zydis (Thea [place]) disintegrating tablet 2.5 mg    DISCONTD: OLANZapine zydis (ZYPREXA) disintegrating tablet 5 mg    DISCONTD: piperacillin-tazobactam (ZOSYN) 3,375 mg in sodium chloride 0.9 % 50 mL extended IVPB (mini-bag)     Order Specific Question:   Antimicrobial Indications     Answer:   Pneumonia (HAP)     Order Specific Question:   Antimicrobial Indications     Answer:   Skin and Soft Tissue Infection     Order Specific Question:   Skin duration of therapy     Answer:   7 days     Order Specific Question:   HAP duration of therapy     Answer:   7 days    DISCONTD: linezolid (ZYVOX) IVPB 600 mg     Order Specific Question:   Antimicrobial Indications     Answer:   Pneumonia (HAP)     Order Specific Question:   HAP duration of therapy     Answer:   7 days    FOLLOWED BY Linked Order Group     piperacillin-tazobactam (ZOSYN) 4,500 mg in sodium chloride 0.9 % 100 mL IVPB (mini-bag)      Order Specific Question:   Antimicrobial Indications      Answer:   Skin and Soft Tissue Infection      Order Specific Question:   Antimicrobial Indications      Answer:   Pneumonia (HAP)      Order Specific Question:   Skin duration of therapy      Answer:   7 days      Order Specific Question:   HAP duration of therapy      Answer:   7 days     piperacillin-tazobactam (ZOSYN) 3,375 mg in sodium chloride 0.9 % 50 mL IVPB (mini-bag)      Order Specific Question:   Antimicrobial Indications      Answer:   Skin and Soft Tissue Infection      Order Specific Question:   Antimicrobial Indications      Answer:   Pneumonia (HAP)      Order Specific Question:   Skin duration of therapy      Answer:   7 days      Order Specific Question:   HAP duration of therapy      Answer:   7 days    DISCONTD: OLANZapine zydis (ZYPREXA) disintegrating tablet 2.5 mg    acetaminophen (TYLENOL) tablet 650 mg    potassium chloride (KLOR-CON M) extended release tablet 40 mEq    amLODIPine (NORVASC) tablet 5 mg    apixaban (ELIQUIS) 2.5 MG TABS tablet     Sig: Take 1 tablet by mouth in the morning and 1 tablet in the evening. Dispense:  60 tablet    amLODIPine (NORVASC) 5 MG tablet     Sig: Take 1 tablet by mouth daily     Dispense:  30 tablet     Refill:  3    amoxicillin-clavulanate (AUGMENTIN) 500-125 MG per tablet     Sig: Take 1 tablet by mouth 2 times daily for 2 days     Dispense:  4 tablet     Refill:  0    furosemide (LASIX) tablet 20 mg    furosemide (LASIX) 20 MG tablet     Sig: Take 1 tablet by mouth daily     Dispense:  60 tablet     Refill:  3       CONSULTS:  IP CONSULT TO HOSPITALIST  IP CONSULT TO NEUROSURGERY  IP CONSULT TO NEPHROLOGY  IP CONSULT TO PALLIATIVE CARE  IP CONSULT TO UROLOGY    Review of Systems     Review of Systems   Constitutional:  Negative for chills and fever. Respiratory:  Negative for shortness of breath. Cardiovascular:  Positive for leg swelling. Negative for chest pain. Gastrointestinal:  Negative for diarrhea, nausea and vomiting. Musculoskeletal:  Positive for back pain. 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 He has a past surgical history that includes Prostate surgery; joint replacement (Right, 2005); and Kidney removal (Left). His family history is not on file. He reports that he quit smoking about 42 years ago. He has never used smokeless tobacco. He reports current alcohol use. He reports that he does not use drugs.     Medications     Current Discharge Medication List        CONTINUE these medications which have NOT CHANGED    Details   lidocaine 4 % external patch Place 1 patch onto the skin daily  Qty: 1 box, Refills: 1      melatonin 5 MG TBDP disintegrating tablet Take 1 tablet by mouth nightly as needed (insomnia)  Qty: 30 tablet, Refills: 1      clobetasol (TEMOVATE) 0.05 % cream Apply topically 2 times daily Apply topically 2 times daily Monday-Friday to R Lower Leg Wound      diclofenac sodium (VOLTAREN) 1 % GEL Apply 4 g topically 2 times daily      Multiple Vitamins-Minerals (MULTI VITAMIN/MINERALS) TABS Take  by mouth. Allergies     He is allergic to morphine and sulfamethoxazole-trimethoprim. Physical Exam     INITIAL VITALS: BP: (!) 157/86, Temp: 97.5 °F (36.4 °C), Heart Rate: 77, Resp: 16, SpO2: 100 %   Physical Exam  Vitals and nursing note reviewed. Constitutional:       General: He is not in acute distress. Appearance: Normal appearance. He is not toxic-appearing. HENT:      Head: Normocephalic and atraumatic. Right Ear: External ear normal.      Left Ear: External ear normal.      Nose: Nose normal. No congestion or rhinorrhea. Mouth/Throat:      Mouth: Mucous membranes are moist.   Eyes:      General: No scleral icterus. Right eye: No discharge. Left eye: No discharge. Extraocular Movements: Extraocular movements intact. Conjunctiva/sclera: Conjunctivae normal.      Pupils: Pupils are equal, round, and reactive to light. Cardiovascular:      Rate and Rhythm: Normal rate and regular rhythm. Pulses: Normal pulses. Heart sounds: Normal heart sounds. No murmur heard. No friction rub. No gallop. Pulmonary:      Effort: Pulmonary effort is normal. No respiratory distress. Breath sounds: Normal breath sounds. No wheezing, rhonchi or rales. Abdominal:      General: Abdomen is flat. There is no distension. Palpations: Abdomen is soft. Tenderness: There is no abdominal tenderness. There is no guarding or rebound. Musculoskeletal:         General: Normal range of motion. Cervical back: Normal range of motion and neck supple. Right lower leg: Edema present. Left lower leg: Edema present. Comments: Bilateral lower extremity edema worse on the right with erythema and blistering on the right. No crepitus. He also has midline L-spine tenderness to palpation without bony step-off and bilateral paraspinal tenderness to palpation   Skin:     General: Skin is warm and dry. Neurological:      General: No focal deficit present. Mental Status: He is alert and oriented to person, place, and time. Comments: Sensation to light touch intact throughout, CN III through XII intact, strength exam bilateral lower extremity somewhat limited due to pain.    Psychiatric:         Mood and Affect: Mood normal.         Behavior: Behavior normal.             Jp Ulloa MD  Resident  02/06/23 7600

## 2023-01-28 LAB
ANION GAP SERPL CALCULATED.3IONS-SCNC: 11 MMOL/L (ref 3–16)
BASOPHILS ABSOLUTE: 0 K/UL (ref 0–0.2)
BASOPHILS RELATIVE PERCENT: 0.4 %
BUN BLDV-MCNC: 27 MG/DL (ref 7–20)
CALCIUM SERPL-MCNC: 9.5 MG/DL (ref 8.3–10.6)
CHLORIDE BLD-SCNC: 109 MMOL/L (ref 99–110)
CO2: 24 MMOL/L (ref 21–32)
CREAT SERPL-MCNC: 1.7 MG/DL (ref 0.8–1.3)
EOSINOPHILS ABSOLUTE: 0.1 K/UL (ref 0–0.6)
EOSINOPHILS RELATIVE PERCENT: 1.4 %
GFR SERPL CREATININE-BSD FRML MDRD: 37 ML/MIN/{1.73_M2}
GLUCOSE BLD-MCNC: 106 MG/DL (ref 70–99)
HCT VFR BLD CALC: 36.8 % (ref 40.5–52.5)
HEMOGLOBIN: 12.2 G/DL (ref 13.5–17.5)
LYMPHOCYTES ABSOLUTE: 1 K/UL (ref 1–5.1)
LYMPHOCYTES RELATIVE PERCENT: 17.9 %
MCH RBC QN AUTO: 32.2 PG (ref 26–34)
MCHC RBC AUTO-ENTMCNC: 33.1 G/DL (ref 31–36)
MCV RBC AUTO: 97.4 FL (ref 80–100)
MONOCYTES ABSOLUTE: 0.6 K/UL (ref 0–1.3)
MONOCYTES RELATIVE PERCENT: 11 %
NEUTROPHILS ABSOLUTE: 4.1 K/UL (ref 1.7–7.7)
NEUTROPHILS RELATIVE PERCENT: 69.3 %
PDW BLD-RTO: 14.1 % (ref 12.4–15.4)
PLATELET # BLD: 229 K/UL (ref 135–450)
PMV BLD AUTO: 7.7 FL (ref 5–10.5)
POTASSIUM REFLEX MAGNESIUM: 4.6 MMOL/L (ref 3.5–5.1)
RBC # BLD: 3.78 M/UL (ref 4.2–5.9)
SODIUM BLD-SCNC: 144 MMOL/L (ref 136–145)
WBC # BLD: 5.8 K/UL (ref 4–11)

## 2023-01-28 PROCEDURE — 6360000002 HC RX W HCPCS: Performed by: INTERNAL MEDICINE

## 2023-01-28 PROCEDURE — 2580000003 HC RX 258: Performed by: INTERNAL MEDICINE

## 2023-01-28 PROCEDURE — 97166 OT EVAL MOD COMPLEX 45 MIN: CPT

## 2023-01-28 PROCEDURE — 96376 TX/PRO/DX INJ SAME DRUG ADON: CPT

## 2023-01-28 PROCEDURE — 85025 COMPLETE CBC W/AUTO DIFF WBC: CPT

## 2023-01-28 PROCEDURE — 96366 THER/PROPH/DIAG IV INF ADDON: CPT

## 2023-01-28 PROCEDURE — 80048 BASIC METABOLIC PNL TOTAL CA: CPT

## 2023-01-28 PROCEDURE — 97530 THERAPEUTIC ACTIVITIES: CPT

## 2023-01-28 PROCEDURE — G0378 HOSPITAL OBSERVATION PER HR: HCPCS

## 2023-01-28 PROCEDURE — 6370000000 HC RX 637 (ALT 250 FOR IP): Performed by: INTERNAL MEDICINE

## 2023-01-28 PROCEDURE — 6370000000 HC RX 637 (ALT 250 FOR IP): Performed by: STUDENT IN AN ORGANIZED HEALTH CARE EDUCATION/TRAINING PROGRAM

## 2023-01-28 PROCEDURE — 97535 SELF CARE MNGMENT TRAINING: CPT

## 2023-01-28 PROCEDURE — 36415 COLL VENOUS BLD VENIPUNCTURE: CPT

## 2023-01-28 RX ORDER — MORPHINE SULFATE 2 MG/ML
1 INJECTION, SOLUTION INTRAMUSCULAR; INTRAVENOUS
Status: COMPLETED | OUTPATIENT
Start: 2023-01-28 | End: 2023-01-29

## 2023-01-28 RX ORDER — MORPHINE SULFATE 2 MG/ML
1 INJECTION, SOLUTION INTRAMUSCULAR; INTRAVENOUS
Status: COMPLETED | OUTPATIENT
Start: 2023-01-28 | End: 2023-01-28

## 2023-01-28 RX ADMIN — CEFAZOLIN 2000 MG: 2 INJECTION, POWDER, FOR SOLUTION INTRAMUSCULAR; INTRAVENOUS at 12:42

## 2023-01-28 RX ADMIN — MULTIPLE VITAMINS W/ MINERALS TAB 1 TABLET: TAB at 09:05

## 2023-01-28 RX ADMIN — SENNOSIDES AND DOCUSATE SODIUM 1 TABLET: 50; 8.6 TABLET ORAL at 21:35

## 2023-01-28 RX ADMIN — ACETAMINOPHEN 650 MG: 325 TABLET, FILM COATED ORAL at 15:03

## 2023-01-28 RX ADMIN — TIZANIDINE 4 MG: 4 TABLET ORAL at 07:56

## 2023-01-28 RX ADMIN — MORPHINE SULFATE 1 MG: 2 INJECTION, SOLUTION INTRAMUSCULAR; INTRAVENOUS at 07:32

## 2023-01-28 RX ADMIN — SENNOSIDES AND DOCUSATE SODIUM 1 TABLET: 50; 8.6 TABLET ORAL at 09:04

## 2023-01-28 RX ADMIN — SODIUM CHLORIDE: 9 INJECTION, SOLUTION INTRAVENOUS at 04:51

## 2023-01-28 RX ADMIN — TRAMADOL HYDROCHLORIDE 100 MG: 50 TABLET, COATED ORAL at 07:57

## 2023-01-28 RX ADMIN — APIXABAN 2.5 MG: 2.5 TABLET, FILM COATED ORAL at 09:05

## 2023-01-28 RX ADMIN — APIXABAN 2.5 MG: 2.5 TABLET, FILM COATED ORAL at 00:31

## 2023-01-28 RX ADMIN — SODIUM CHLORIDE: 9 INJECTION, SOLUTION INTRAVENOUS at 12:41

## 2023-01-28 RX ADMIN — CEFAZOLIN 2000 MG: 2 INJECTION, POWDER, FOR SOLUTION INTRAMUSCULAR; INTRAVENOUS at 04:52

## 2023-01-28 RX ADMIN — SODIUM CHLORIDE, PRESERVATIVE FREE 10 ML: 5 INJECTION INTRAVENOUS at 09:06

## 2023-01-28 RX ADMIN — ACETAMINOPHEN 650 MG: 325 TABLET, FILM COATED ORAL at 09:04

## 2023-01-28 RX ADMIN — Medication 5 MG: at 07:56

## 2023-01-28 ASSESSMENT — PAIN SCALES - GENERAL
PAINLEVEL_OUTOF10: 6
PAINLEVEL_OUTOF10: 0
PAINLEVEL_OUTOF10: 0

## 2023-01-28 ASSESSMENT — PAIN DESCRIPTION - LOCATION: LOCATION: BACK

## 2023-01-28 ASSESSMENT — PAIN DESCRIPTION - ORIENTATION: ORIENTATION: MID

## 2023-01-28 NOTE — PROGRESS NOTES
Pt resting in bed, eyes closed, breathing easily, and in no signs of distress. Call light in reach. Bed alarm on.   Tura Olszewski, RN

## 2023-01-28 NOTE — PROGRESS NOTES
Hospitalist Progress Note      Name:  Leigh Ann Cazares /Age/Sex: 5/3/1930  (80 y.o. male)   MRN & CSN:  [id number] & [id number] Admission Date/Time: 2023 12:57 PM   Location:  [id number] PCP: Gallo Bonilla Day: 2    Assessment and Plan:       R leg cellulitis ? Present with more swelling , erythema and pain of r leg  On iv cefazolin  Follow venous doppler   Elevate legs  Pt/ot     2- acute lower back pain : MRI of back show moderate stenosis of L3-4   Consult neurosurgery  Fall precaution    3- CKD : serum creatinine 1.7 his baseline     Venous stasis dermatitis : elevate leg  R leg looks more erythematous and swollen than left   On iv AB   Venous doppler   He is on eliquis        Diet ADULT DIET; Regular   DVT Prophylaxis [] Lovenox, []  Heparin, [] SCDs, [] Ambulation   GI Prophylaxis [] PPI,  [] H2 Blocker,  [] Carafate,  [] Diet/Tube Feeds   Code Status Full Code   Disposition Patient requires continued admission due to    MDM [] Low, [] Moderate,[]  High  Patient's risk as above due to      History of Present Illness: The patient was seen and examined   Denies CP or SOB  Complain of lower back pain  Swelling of r leg with erythema     Objective: Intake/Output Summary (Last 24 hours) at 2023 1004  Last data filed at 2023 0906  Gross per 24 hour   Intake 10 ml   Output 1100 ml   Net -1090 ml      Vitals:   Vitals:    23 0930   BP: (!) 164/90   Pulse:    Resp:    Temp:    SpO2:      Physical Exam:   GEN Awake.  Alert , not in respiratory distress, not in pain  HEENT: PEERLA, , supple neck,   Chest: air entry equal bilaterally, no wheezing or crepitation  Heart: S1 and S2 heard, no murmur, no gallop or rub, regular rate  Abdomen: soft, ND , Nt, +BS  Extremities: no cyanosis, there is erythema , swelling and tenderness of right leg more than left leg ,  peripheral pulses audible  Neurology: alert, oriented x3, able to move 4 limbs    Medications:   Medications: lidocaine  1 patch TransDERmal Daily    sodium chloride flush  5-40 mL IntraVENous 2 times per day    sennosides-docusate sodium  1 tablet Oral BID    ceFAZolin  2,000 mg IntraVENous Q8H    acetaminophen  650 mg Oral Q6H    apixaban  2.5 mg Oral Q12H    therapeutic multivitamin-minerals   Oral Daily      Infusions:    sodium chloride       PRN Meds: sodium chloride flush, 5-40 mL, PRN  sodium chloride, , PRN  ondansetron, 4 mg, Q8H PRN   Or  ondansetron, 4 mg, Q6H PRN  polyethylene glycol, 17 g, Daily PRN  acetaminophen, 650 mg, Q6H PRN   Or  acetaminophen, 650 mg, Q6H PRN  magnesium sulfate, 2,000 mg, PRN  potassium chloride, 40 mEq, PRN   Or  potassium alternative oral replacement, 40 mEq, PRN   Or  potassium chloride, 10 mEq, PRN  sodium phosphate, 1 enema, Daily PRN  aluminum & magnesium hydroxide-simethicone, 30 mL, Q6H PRN  traMADol, 50 mg, Q6H PRN   Or  traMADol, 100 mg, Q6H PRN  tiZANidine, 4 mg, Q8H PRN  melatonin, 5 mg, Nightly PRN          Electronically signed by Juanpablo Cedillo MD on 1/28/2023 at 10:04 AM

## 2023-01-28 NOTE — PROGRESS NOTES
4 Eyes Admission Assessment     I agree as the admission nurse that 2 RN's have performed a thorough Head to Toe Skin Assessment on the patient. ALL assessment sites listed below have been assessed on admission. Areas assessed by both nurses:   [x]   Head, Face, and Ears   [x]   Shoulders, Back, and Chest  [x]   Arms, Elbows, and Hands   [x]   Coccyx, Sacrum, and Ischium  [x]   Legs, Feet, and Heels        Does the Patient have Skin Breakdown? No       Scattered bruising, redness on back and bottom. scrotum swollen and red. redness and bleeding from guzman site. Abrasion on right knee.    Lionel Prevention initiated:  No   Wound Care Orders initiated:  No      [facility] nurse consulted for Pressure Injury (Stage 3,4, Unstageable, DTI, NWPT, and Complex wounds) or Lionel score 18 or lower:  No      Nurse 1 eSignature: Electronically signed by Renata Easley RN on 1/27/23 at 10:58 PM EST    **SHARE this note so that the co-signing nurse is able to place an eSignature**    Nurse 2 eSignature: Electronically signed by Risa Baxter RN on 1/28/23 at 5:56 AM EST

## 2023-01-28 NOTE — PROGRESS NOTES
Perfect serve message was sent to the on call MD at this time about his elevated bp of 178/89, waiting on new orders and/or response. The pt currently has no complaints of any pain. I will continue to follow throughout the shift.   Earl Swanson RN

## 2023-01-28 NOTE — PROGRESS NOTES
Patient admitted to 18. VSS with an exception of elevated BP. Did guzman care with soap and water. Oriented to room, call system and staff. All fall precaution in place. Will continue to monitor.

## 2023-01-28 NOTE — PROGRESS NOTES
Pt had guzman in from the ER, I spoke to the MD (Dr. Irving Zhang) and he said to take the guzman out and do a voiding trial.  Guzman removed per MD/protocol. Pt tolerated well.  Sridhar Damon RN

## 2023-01-28 NOTE — PROGRESS NOTES
Regarding elevated bp: MD wanted a manual bp done, manual bp was done by charge RN, MD then made aware of that result, no new orders at this time, will continue to follow throughout the shift.   Jyotsna Washington RN

## 2023-01-28 NOTE — PROGRESS NOTES
Occupational Therapy  Facility/Department: [facility] 5T ORTHO/NEURO  Occupational Therapy Initial Assessment/ treatment    Name: Tomeka Rodriguez  : 5/3/1930  MRN: [id number]  Date of Service: 2023    Discharge Recommendations:     OT Equipment Recommendations  Equipment Needed: No  Other: defer   Tomeka Rodriguez scored a 14/24 on the AM-PAC ADL Inpatient form. Current research shows that an AM-PAC score of 17 or less is typically not associated with a discharge to the patient's home setting. Based on the patient's AM-PAC score and their current ADL deficits, it is recommended that the patient have 3-5 sessions per week of Occupational Therapy at d/c to increase the patient's independence. Please see assessment section for further patient specific details. If patient discharges prior to next session this note will serve as a discharge summary. Please see below for the latest assessment towards goals. Patient Diagnosis(es): The encounter diagnosis was Cellulitis of right lower extremity. 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 Past Surgical History:  has a past surgical history that includes Prostate surgery; joint replacement (Right, ); and Kidney removal (Left). Treatment Diagnosis: decreased ADLs and transfers secondary to cellulitis      Assessment   Performance deficits / Impairments: Decreased functional mobility ; Decreased endurance;Decreased coordination;Decreased ADL status; Decreased balance;Decreased strength  Assessment: Prior to admission pt was living in [address] [place] [address], was independent with ADLs. Pt now presents s/p multiple falls and cellulitis in B LEs.  At this point pt is below his baseline, demonstrating min A for sit to stand transfers and for funcitonal mobiltiy in room. Pt would benefit from IP OT prior to returning to [address] [place] [address]. Continue OT POC. Treatment Diagnosis: decreased ADLs and transfers secondary to cellulitis  Prognosis: Fair  Decision Making: Medium Complexity  REQUIRES OT FOLLOW-UP: Yes  Activity Tolerance  Activity Tolerance: Patient Tolerated treatment well;Patient limited by fatigue  Activity Tolerance Comments: Pt demonstrated min fatigue after short distance mobility in room        Plan   Occupational Therapy Plan  Times Per Week: 2-5  Times Per Day: Once a day  Current Treatment Recommendations: Strengthening, Balance training, Self-Care / ADL, Coordination training, Functional mobility training, Endurance training, Patient/Caregiver education & training, Safety education & training     Restrictions       Subjective   General  Chart Reviewed: Yes  Additional Pertinent Hx: Pt admitted to ED with c/o frequent falls and weeping LEs. Found with B LE cellulitis. PMHx includes: anxiety, arthritis, CA, CKD, dementia, MRSA, hearing loss, venous thromboembolism. Family / Caregiver Present: No (daughter in law arrived at end of session)  Referring Practitioner: Brooke Laws MD  Diagnosis: R LE cellulitis  Subjective  Subjective: Pt semi supine in bed upon arrival, agreeable to OT eval and treat.      Social/Functional History  Social/Functional History  Lives With: Alone  Type of Home: Facility ([address] [place] [address])  Home Layout: One level  Home Access: Ramped entrance, Elevator  Bathroom Shower/Tub: Walk-in shower  Bathroom Toilet: Handicap height  Bathroom Equipment: Grab bars in shower, Built-in shower seat, Hand-held shower  Home Equipment: Donnice Lightning, 4 wheeled  Has the patient had two or more falls in the past year or any fall with injury in the past year?: Yes  ADL Assistance: Independent  Homemaking Responsibilities: No (facility staff)  Ambulation Assistance: Independent (with rollator down to dining room)  Transfer Assistance: Independent  Active : No  Patient's  Info: family  Additional Comments: Daughter in law arrived during session, corroborated above information       Objective              Safety Devices  Type of Devices: Left in chair;Call light within reach;Chair alarm in place           ADL  Feeding: Beverage management;Setup  LE Dressing: Dependent/Total  LE Dressing Skilled Clinical Factors: donning B socks  Toileting: Dependent/Total  Toileting Skilled Clinical Factors: guzman catheter           Transfers  Sit to stand: Minimal assistance  Stand to sit: Minimal assistance  Transfer Comments: ++ cueing for hand placement. Pt completed stand pivot transfer from bed to chair, max cueing to correctly align to chair. Pt completed mobility short distance from chair to end of bed and back. Assist to manage walker and cueing to sequence.     Cognition  Overall Cognitive Status: Exceptions  Following Commands: Follows one step commands with increased time;Follows one step commands with repetition;Follows one step commands consistently  Attention Span: Difficulty dividing attention  Safety Judgement: Decreased awareness of need for safety  Insights: Decreased awareness of deficits  Initiation: Requires cues for some  Sequencing: Requires cues for some  Cognition Comment: pt has dementia at baseline  Orientation  Overall Orientation Status: Impaired  Orientation Level: Oriented to person;Disoriented to place;Disoriented to time;Disoriented to situation                  Education Given To: Patient;Family  Education Provided: Role of Therapy;Plan of Care  Education Method: Verbal  Barriers to Learning: Cognition  Education Outcome: Verbalized understanding      Treatment included functional transfer training, ADLs, and patient education.       AM-PAC Score        AM-PAC Inpatient Daily Activity Raw Score: 14 (01/28/23 1504)  AM-PAC Inpatient ADL T-Scale Score : 33.39 (01/28/23 1504)  ADL Inpatient CMS 0-100% Score:  59.67 (01/28/23 1504)  ADL Inpatient CMS G-Code Modifier : CK (01/28/23 1504)           Goals  Short Term Goals  Time Frame for Short Term Goals: by dc  Short Term Goal 1: Pt will complete LB dressing with min A  Short Term Goal 2: Pt will complete toileting with min A  Short Term Goal 3: Pt will complete standing level grooming with min A  Patient Goals   Patient goals : for my knee to feel better       Therapy Time   Individual Concurrent Group Co-treatment   Time In 1416         Time Out 1455         Minutes 39         Timed Code Treatment Minutes: 24 Minutes (+15 min eval)     Ludmila JESUS  [address] [facility], OTR/L [id number]

## 2023-01-28 NOTE — H&P
Hospital Medicine History & Physical      PCP: Ashley Colon DO    Date of Admission: 1/27/2023    Date of Service: Pt seen/examined on 1/27/2023 and placed in Observation. Chief Complaint: \"I am confused\"      History Of Present Illness:   80 y.o. male very hard of hearing with history of dementia and therefore poor historian with a significant past medical history of bilateral lower extremity DVTs, chronic venous stasis who presented to [facility] ED with acute low back pain and difficulty ambulating. At baseline he walks with a walker. 2 days ago he had a fall and since he has been complaining of low back pain and right lower extremity pain and has had difficulty getting around. It is unclear whether his fall was witnessed or not. His baseline cognitive status is also unknown at this time. Patient cannot contribute to history much and there is no one available from the facility to supply any more information. Emergency room his temperature was 97.5, blood pressure 157/86, pulse 77, respirations 16, sats 100% on room air. Past Medical History:          Diagnosis Date    Adenocarcinoma of [address] ([facility]     Anxiety     Arthritis     Cancer ([facility]     skin    Cataract     CKD (chronic kidney disease) stage 3, GFR 30-59 ml/min ([facility])     Colon polyps     Dementia ([facility]     MRSA infection 03/13/2014    leg wound    Retroperitoneal sarcoma ([facility])     Severe hearing loss     Venous thromboembolism (VTE)        Past Surgical History:          Procedure Laterality Date    JOINT REPLACEMENT Right 2005    hip    KIDNEY REMOVAL Left     PROSTATE SURGERY         Medications Prior to Admission:      Prior to Admission medications    Medication Sig Start Date End Date Taking?  Authorizing Provider   lidocaine 4 % external patch Place 1 patch onto the skin daily 5/18/22   Anish Carreon MD   melatonin 5 MG TBDP disintegrating tablet Take 1 tablet by mouth nightly as needed (insomnia) 5/17/22   Anish Carreon MD   apixaban starter pack (ELIQUIS DVT/PE STARTER PACK) 5 MG TBPK tablet Take 1 tablet by mouth See Admin Instructions 5/17/22   Candance Dare, MD   clobetasol (TEMOVATE) 0.05 % cream Apply topically 2 times daily Apply topically 2 times daily Monday-Friday to R Lower Leg Wound    Historical Provider, MD   diclofenac sodium (VOLTAREN) 1 % GEL Apply 4 g topically 2 times daily    Historical Provider, MD   Multiple Vitamins-Minerals (MULTI VITAMIN/MINERALS) TABS Take  by mouth. Historical Provider, MD       Allergies:  Sulfamethoxazole-trimethoprim    Social History:      The patient currently lives in long-term care. TOBACCO:   reports that he quit smoking about 42 years ago. He has never used smokeless tobacco.  ETOH:   reports current alcohol use. E-cigarette/Vaping       Questions Responses    E-cigarette/Vaping Use     Start Date     Passive Exposure     Quit Date     Counseling Given     Comments               Family History:      Unable to obtain due to patient's dementia. REVIEW OF SYSTEMS COMPLETED:     Unable to obtain due to patient's dementia. PHYSICAL EXAM PERFORMED:    BP 98/80   Pulse 93   Temp 97.5 °F (36.4 °C) (Oral)   Resp 17   Ht 5' 8\" (1.727 m)   Wt 206 lb (93.4 kg)   SpO2 95%   BMI 31.32 kg/m²     General appearance: Well-developed/obese, nontoxic appearing in no apparent distress. HEENT:  Normal cephalic, atraumatic without obvious deformity. Pupils equal, round, and reactive to light. Extra ocular muscles intact. Conjunctivae/corneas clear. Dry oral mucosa  Neck: Supple, with full range of motion. No jugular venous distention. No bruits. No lymphadenopathy. No thyromegaly. Trachea midline. Respiratory:  Normal respiratory effort. Clear to auscultation, bilaterally without Rales/Wheezes/Rhonchi. Cardiovascular:  Regular rate and rhythm with normal S1/S2. Abdomen: Soft, non-tender, non-distended with normal bowel sounds. Musculoskeletal:  No clubbing, cyanosis. Bilateral lower extremity edema, nonpitting. No gross joint deformities. Normal muscle tone and bulk. Skin: Normal color, dry. Cobblestoning and thickening of the skin as well as hemosiderin deposition over anterior shins bilaterally increased erythema and warmth over the right lower extremity with some oozing. No petechiae, rashes. Scattered ecchymosis over forearms. Neurologic: AAOx1 CN II-XII grossly intact. Nonfocal exam.  Psychiatric: Normal mood and affect, poor insight. Capillary Refill: Brisk,3 seconds, normal  Peripheral Pulses: +2 palpable, equal bilaterally       Labs:     Recent Labs     01/27/23  1329   WBC 5.5   HGB 12.3*   HCT 37.2*        Recent Labs     01/27/23  1329      K 4.6      CO2 23   BUN 33*   CREATININE 1.8*   CALCIUM 9.7     No results for input(s): AST, ALT, BILIDIR, BILITOT, ALKPHOS in the last 72 hours. No results for input(s): INR in the last 72 hours. No results for input(s): Hazeline Balk in the last 72 hours. Urinalysis:      Lab Results   Component Value Date/Time    NITRU Negative 01/27/2023 03:53 PM    BLOODU Negative 01/27/2023 03:53 PM    SPECGRAV >=1.030 01/27/2023 03:53 PM    GLUCOSEU Negative 01/27/2023 03:53 PM       Radiology:     CXR: I have reviewed the CXR with the following interpretation: None done  EKG:  I have reviewed the EKG with the following interpretation: Normal    MRI LUMBAR SPINE WO CONTRAST   Final Result   1. Moderate acquired stenosis at L4-5 related to combined discopathy and significant ligament flavum hypertrophy. This narrows the AP dimension of the thecal sac to approximately 6.7 mm.   2. Mild acquired narrowing of the central canal at L2-3 related to discopathy and ligament flavum hypertrophy   3. Mild left neural exit foraminal narrowing at L2-3, L4-5 related to discopathy and hyperostosis from facet arthrosis   4.  Mild narrowing of the right neural extra foramina at L1-2 related to mostly posteriorly oriented endplate osteophyte at L1      CT Head W/O Contrast   Final Result   1. Mild atrophy. 2. Patchy areas of decreased white matter attenuation. The findings are nonspecific, but most likely represent chronic small vessel ischemic change. 3.  No evidence of an acute intracranial process. CT LUMBAR SPINE WO CONTRAST   Final Result      1. No acute osseous abnormality. 2.  Degenerative spondylosis/disc disease resulting in severe central canal stenosis at L2-L3 and mild central canal stenosis at other lumbar spine levels. XR TIBIA FIBULA RIGHT (2 VIEWS)   Final Result   1. No acute fracture or dislocation. Consults:    IP CONSULT TO HOSPITALIST    ASSESSMENT:    Active Hospital Problems    Diagnosis Date Noted    Acute low back pain [M54.50] 01/27/2023     Priority: Medium    Left leg cellulitis [O36.802] 01/27/2023     Priority: Medium    Ambulatory dysfunction [R26.2] 01/27/2023     Priority: Medium    Acute low back pain with disc symptoms, duration less than 6 weeks [M54.50, M51.9] 01/27/2023     Priority: Medium    Chronic anticoagulation [Z79.01] 01/27/2023     Priority: Low   Venous stasis dermatitis      PLAN:  -Pain management  -PT/OT eval for discharge planning  -Fall precautions  -Cefazolin for the right lower extremity cellulitis  -Elevate legs  -Continue home regimen for chronic stable conditions. DVT Prophylaxis: Eliquis  Diet: ADULT DIET; Regular  Code Status: Full Code    PT/OT Eval Status: Pending    Dispo -to be determined       Chris Donald MD    Thank you Corinne Adhikari DO for the opportunity to be involved in this patient's care. If you have any questions or concerns please feel free to contact me at [phone].

## 2023-01-28 NOTE — ED NOTES
Initial contact with patient. Report was received from [facility]. Patient states pain 4/10 in back. Family at bedside. Updated on plan of care. Call light in reach.       El Maya RN  01/27/23 [id number]

## 2023-01-29 ENCOUNTER — APPOINTMENT (OUTPATIENT)
Dept: CT IMAGING | Age: 88
DRG: 551 | End: 2023-01-29
Payer: MEDICARE

## 2023-01-29 PROBLEM — M54.10 ACUTE BACK PAIN WITH RADICULOPATHY: Status: ACTIVE | Noted: 2023-01-29

## 2023-01-29 LAB
A/G RATIO: 1.3 (ref 1.1–2.2)
ALBUMIN SERPL-MCNC: 4.2 G/DL (ref 3.4–5)
ALP BLD-CCNC: 81 U/L (ref 40–129)
ALT SERPL-CCNC: 17 U/L (ref 10–40)
ANION GAP SERPL CALCULATED.3IONS-SCNC: 17 MMOL/L (ref 3–16)
AST SERPL-CCNC: 37 U/L (ref 15–37)
BASOPHILS ABSOLUTE: 0 K/UL (ref 0–0.2)
BASOPHILS RELATIVE PERCENT: 0.4 %
BILIRUB SERPL-MCNC: 0.5 MG/DL (ref 0–1)
BUN BLDV-MCNC: 23 MG/DL (ref 7–20)
CALCIUM SERPL-MCNC: 9.9 MG/DL (ref 8.3–10.6)
CHLORIDE BLD-SCNC: 104 MMOL/L (ref 99–110)
CO2: 19 MMOL/L (ref 21–32)
CREAT SERPL-MCNC: 1.8 MG/DL (ref 0.8–1.3)
EOSINOPHILS ABSOLUTE: 0 K/UL (ref 0–0.6)
EOSINOPHILS RELATIVE PERCENT: 0.7 %
GFR SERPL CREATININE-BSD FRML MDRD: 35 ML/MIN/{1.73_M2}
GLUCOSE BLD-MCNC: 107 MG/DL (ref 70–99)
HCT VFR BLD CALC: 40.2 % (ref 40.5–52.5)
HEMOGLOBIN: 13.5 G/DL (ref 13.5–17.5)
LYMPHOCYTES ABSOLUTE: 0.8 K/UL (ref 1–5.1)
LYMPHOCYTES RELATIVE PERCENT: 11.4 %
MCH RBC QN AUTO: 32.9 PG (ref 26–34)
MCHC RBC AUTO-ENTMCNC: 33.5 G/DL (ref 31–36)
MCV RBC AUTO: 98.3 FL (ref 80–100)
MONOCYTES ABSOLUTE: 0.8 K/UL (ref 0–1.3)
MONOCYTES RELATIVE PERCENT: 11.1 %
NEUTROPHILS ABSOLUTE: 5.5 K/UL (ref 1.7–7.7)
NEUTROPHILS RELATIVE PERCENT: 76.4 %
PDW BLD-RTO: 14.4 % (ref 12.4–15.4)
PLATELET # BLD: 206 K/UL (ref 135–450)
PMV BLD AUTO: 7.6 FL (ref 5–10.5)
POTASSIUM REFLEX MAGNESIUM: 4 MMOL/L (ref 3.5–5.1)
PRO-BNP: 4153 PG/ML (ref 0–449)
RBC # BLD: 4.09 M/UL (ref 4.2–5.9)
SODIUM BLD-SCNC: 140 MMOL/L (ref 136–145)
TOTAL PROTEIN: 7.5 G/DL (ref 6.4–8.2)
WBC # BLD: 7.2 K/UL (ref 4–11)

## 2023-01-29 PROCEDURE — 74176 CT ABD & PELVIS W/O CONTRAST: CPT

## 2023-01-29 PROCEDURE — G0378 HOSPITAL OBSERVATION PER HR: HCPCS

## 2023-01-29 PROCEDURE — 1200000000 HC SEMI PRIVATE

## 2023-01-29 PROCEDURE — 80053 COMPREHEN METABOLIC PANEL: CPT

## 2023-01-29 PROCEDURE — 85025 COMPLETE CBC W/AUTO DIFF WBC: CPT

## 2023-01-29 PROCEDURE — 2580000003 HC RX 258: Performed by: INTERNAL MEDICINE

## 2023-01-29 PROCEDURE — 72128 CT CHEST SPINE W/O DYE: CPT

## 2023-01-29 PROCEDURE — 51701 INSERT BLADDER CATHETER: CPT

## 2023-01-29 PROCEDURE — 96372 THER/PROPH/DIAG INJ SC/IM: CPT

## 2023-01-29 PROCEDURE — 6370000000 HC RX 637 (ALT 250 FOR IP): Performed by: INTERNAL MEDICINE

## 2023-01-29 PROCEDURE — 51798 US URINE CAPACITY MEASURE: CPT

## 2023-01-29 PROCEDURE — 96376 TX/PRO/DX INJ SAME DRUG ADON: CPT

## 2023-01-29 PROCEDURE — 6360000002 HC RX W HCPCS: Performed by: INTERNAL MEDICINE

## 2023-01-29 PROCEDURE — 2500000003 HC RX 250 WO HCPCS: Performed by: INTERNAL MEDICINE

## 2023-01-29 PROCEDURE — 6360000002 HC RX W HCPCS: Performed by: STUDENT IN AN ORGANIZED HEALTH CARE EDUCATION/TRAINING PROGRAM

## 2023-01-29 PROCEDURE — 83880 ASSAY OF NATRIURETIC PEPTIDE: CPT

## 2023-01-29 PROCEDURE — 36415 COLL VENOUS BLD VENIPUNCTURE: CPT

## 2023-01-29 PROCEDURE — 96366 THER/PROPH/DIAG IV INF ADDON: CPT

## 2023-01-29 RX ORDER — OXYCODONE HYDROCHLORIDE 5 MG/1
5 TABLET ORAL EVERY 4 HOURS PRN
Status: DISCONTINUED | OUTPATIENT
Start: 2023-01-29 | End: 2023-01-30

## 2023-01-29 RX ORDER — HALOPERIDOL 5 MG/ML
5 INJECTION INTRAMUSCULAR EVERY 6 HOURS PRN
Status: DISCONTINUED | OUTPATIENT
Start: 2023-01-29 | End: 2023-01-30

## 2023-01-29 RX ORDER — ENOXAPARIN SODIUM 100 MG/ML
1 INJECTION SUBCUTANEOUS DAILY
Status: DISCONTINUED | OUTPATIENT
Start: 2023-01-29 | End: 2023-02-07 | Stop reason: HOSPADM

## 2023-01-29 RX ORDER — OLANZAPINE 10 MG/1
5 INJECTION, POWDER, LYOPHILIZED, FOR SOLUTION INTRAMUSCULAR ONCE
Status: COMPLETED | OUTPATIENT
Start: 2023-01-29 | End: 2023-01-29

## 2023-01-29 RX ORDER — FUROSEMIDE 10 MG/ML
20 INJECTION INTRAMUSCULAR; INTRAVENOUS ONCE
Status: COMPLETED | OUTPATIENT
Start: 2023-01-29 | End: 2023-01-29

## 2023-01-29 RX ORDER — MORPHINE SULFATE 2 MG/ML
2 INJECTION, SOLUTION INTRAMUSCULAR; INTRAVENOUS EVERY 4 HOURS PRN
Status: DISCONTINUED | OUTPATIENT
Start: 2023-01-29 | End: 2023-01-30

## 2023-01-29 RX ADMIN — CEFAZOLIN 2000 MG: 2 INJECTION, POWDER, FOR SOLUTION INTRAMUSCULAR; INTRAVENOUS at 02:33

## 2023-01-29 RX ADMIN — ENOXAPARIN SODIUM 90 MG: 100 INJECTION SUBCUTANEOUS at 10:10

## 2023-01-29 RX ADMIN — MORPHINE SULFATE 2 MG: 2 INJECTION, SOLUTION INTRAMUSCULAR; INTRAVENOUS at 09:54

## 2023-01-29 RX ADMIN — MORPHINE SULFATE 1 MG: 2 INJECTION, SOLUTION INTRAMUSCULAR; INTRAVENOUS at 01:16

## 2023-01-29 RX ADMIN — SODIUM CHLORIDE, PRESERVATIVE FREE 10 ML: 5 INJECTION INTRAVENOUS at 09:54

## 2023-01-29 RX ADMIN — OLANZAPINE 5 MG: 10 INJECTION, POWDER, FOR SOLUTION INTRAMUSCULAR at 03:19

## 2023-01-29 RX ADMIN — HALOPERIDOL LACTATE 5 MG: 5 INJECTION, SOLUTION INTRAMUSCULAR at 23:29

## 2023-01-29 RX ADMIN — CEFAZOLIN 2000 MG: 2 INJECTION, POWDER, FOR SOLUTION INTRAMUSCULAR; INTRAVENOUS at 20:38

## 2023-01-29 RX ADMIN — MORPHINE SULFATE 2 MG: 2 INJECTION, SOLUTION INTRAMUSCULAR; INTRAVENOUS at 05:39

## 2023-01-29 RX ADMIN — FUROSEMIDE 20 MG: 10 INJECTION, SOLUTION INTRAMUSCULAR; INTRAVENOUS at 17:41

## 2023-01-29 RX ADMIN — SODIUM CHLORIDE: 9 INJECTION, SOLUTION INTRAVENOUS at 12:51

## 2023-01-29 RX ADMIN — HALOPERIDOL LACTATE 5 MG: 5 INJECTION, SOLUTION INTRAMUSCULAR at 04:04

## 2023-01-29 RX ADMIN — CEFAZOLIN 2000 MG: 2 INJECTION, POWDER, FOR SOLUTION INTRAMUSCULAR; INTRAVENOUS at 12:53

## 2023-01-29 RX ADMIN — HALOPERIDOL LACTATE 5 MG: 5 INJECTION, SOLUTION INTRAMUSCULAR at 10:36

## 2023-01-29 ASSESSMENT — PAIN DESCRIPTION - ORIENTATION
ORIENTATION: RIGHT;LEFT
ORIENTATION: LEFT;RIGHT

## 2023-01-29 ASSESSMENT — PAIN SCALES - GENERAL
PAINLEVEL_OUTOF10: 10
PAINLEVEL_OUTOF10: 10

## 2023-01-29 ASSESSMENT — PAIN DESCRIPTION - LOCATION
LOCATION: BACK;LEG
LOCATION: BACK;LEG

## 2023-01-29 ASSESSMENT — PAIN DESCRIPTION - DESCRIPTORS
DESCRIPTORS: ACHING
DESCRIPTORS: ACHING

## 2023-01-29 NOTE — PROGRESS NOTES
A perfect serve message was sent to Dr. Cirilo Hairston about the pt refusing his medications, and to see if she would like to change his anticoagulant to subQ since he is refusing to take his Eliquis. Also mentioned this pt's elevated bp, but the pt is in pain and also agitated, I will continue to follow throughout the shift.   Humza Junior RN

## 2023-01-29 NOTE — PROGRESS NOTES
Pt fell asleep, able to give IV abx and took vitals.     0310 - pt woke up confused, agitated, and combative. Zyprexa IM per order was given.

## 2023-01-29 NOTE — PROGRESS NOTES
2300 - Assumed care. Pt sleeping, no needs at this time. Camera in place for safety, bed rails up, bed locked and bed alarm on. No needs at this time, will continue to monitor.

## 2023-01-29 NOTE — PROGRESS NOTES
Paged patient complaining of severe pain, has confusion and refusing pills. Reordered morphine 1 mg x 1.     DO Primitivo Fontanez cover hospitalist

## 2023-01-29 NOTE — PROGRESS NOTES
Pt continued to be agitated and combative, MD made aware. Haldol was ordered and given. Pt was still combative, yelling, and agitated. MD made aware and bilateral non-violent restraints were ordered. [id number] - pt now on soft restraints and continually yelling for help. RN Red contacting the family members to make them aware about the changes of care for safety. Will continue to monitor.

## 2023-01-29 NOTE — CONSULTS
Consult received  Patient follow with Dr Alize Alvarado with nurse  On room air  BP is on elevated side. CKD 4  Cr stable  Patient also retaining urine and edematous  Albumin is normal 4.2  Refusing oral medications. Can start low dose IV Lasix 20 mg x 1 now and will further assess in AM.   Check bladder scan q 12 hour and straight cath as needed  Follow I/O  Monitor lytes    Full note to follow. D/W nurse.      Nhan Santillan MD

## 2023-01-29 NOTE — PROGRESS NOTES
Pt agitated, gave ordered IM Haldol, see MAR. Pt going down for his ordered CT scans, techs aware that the pt is in restraints but would like to attempt the scans. I will continue to follow.   Melanie Richardson RN

## 2023-01-29 NOTE — PROGRESS NOTES
Physical Therapy      PT attempted to see patient this AM for initial evaluation however upon discussion with bedside RN & chart review patient with inc'd agitation & refusing care. PT will hold evaluation at this time & f/u as patient is medically appropriate & schedule permits. Thank you. Alfredo Tariq.  Kaitlynn Flaherty, PT DPT

## 2023-01-29 NOTE — PROGRESS NOTES
Pt hasn't voided urine this shift, bladder scan was done and was showing retention, messaged MD and received order for a SC. SC was done and 675 mls of urine came out. MD states if he has retention later to place a guzman next time. Orders placed. I will continue to follow throughout the shift.   Melanie Richardson RN

## 2023-01-29 NOTE — PROGRESS NOTES
Pt is refusing to take any oral medication, despite the pt c/o back and leg pain he will not even take pain pills. This nurse called his son Jonathan Wisdom to update him, and his son spoke to him on the phone again telling him that he needs to take his pills but the pt is still refusing. This RN will message the MD about the pt refusing his medications in case the MD would like to switch some medications over to either IV or SubQ/IM injections. The pt does have IV Morphine order for pain, this nurse can give him that for pain control, see CHANEL Kelsey, RN

## 2023-01-29 NOTE — PROGRESS NOTES
Pt's son Ashley Echevarria is now at the bedside visiting all questions answered, notified Dr Scotty Robins that the pt's son is here, she will be by later to update him as well.   Sridhar Damon RN

## 2023-01-29 NOTE — CONSULTS
Department of Neurosurgery  Attending Consult Note        Reason for Consult:  Back Pain      CHIEF COMPLAINT:  Back pain    History Obtained From:  electronic medical record, reason patient could not give history:  altered mental status    HISTORY OF PRESENT ILLNESS:                The patient is a 80 y.o. male w/ dementia who presents with back pain after a fall. Additionally, per report he has right lower extremity pain.     Past Medical History:        Diagnosis Date    Adenocarcinoma of prostate ([facility] [address]     Anxiety     Arthritis     Cancer ([facility] [address]     skin    Cataract     CKD (chronic kidney disease) stage 3, GFR 30-59 ml/min (HCC)     Colon polyps     Dementia ([facility] [address]     MRSA infection 03/13/2014    leg wound    Retroperitoneal sarcoma (HCC)     Severe hearing loss     Venous thromboembolism (VTE)      Past Surgical History:        Procedure Laterality Date    JOINT REPLACEMENT Right 2005    hip    KIDNEY REMOVAL Left     PROSTATE SURGERY       Current Medications:   Current Facility-Administered Medications: haloperidol lactate (HALDOL) injection 5 mg, 5 mg, IntraMUSCular, Q6H PRN  morphine (PF) injection 2 mg, 2 mg, IntraVENous, Q4H PRN  lidocaine 4 % external patch 1 patch, 1 patch, TransDERmal, Daily  sodium chloride flush 0.9 % injection 5-40 mL, 5-40 mL, IntraVENous, 2 times per day  sodium chloride flush 0.9 % injection 5-40 mL, 5-40 mL, IntraVENous, PRN  0.9 % sodium chloride infusion, , IntraVENous, PRN  ondansetron (ZOFRAN-ODT) disintegrating tablet 4 mg, 4 mg, Oral, Q8H PRN **OR** ondansetron (ZOFRAN) injection 4 mg, 4 mg, IntraVENous, Q6H PRN  polyethylene glycol (GLYCOLAX) packet 17 g, 17 g, Oral, Daily PRN  acetaminophen (TYLENOL) tablet 650 mg, 650 mg, Oral, Q6H PRN **OR** acetaminophen (TYLENOL) suppository 650 mg, 650 mg, Rectal, Q6H PRN  magnesium sulfate 2000 mg in 50 mL IVPB premix, 2,000 mg, IntraVENous, PRN  potassium chloride (KLOR-CON M) extended release tablet 40 mEq, 40 mEq, Oral, PRN **OR** potassium bicarb-citric acid (EFFER-K) effervescent tablet 40 mEq, 40 mEq, Oral, PRN **OR** potassium chloride 10 mEq/100 mL IVPB (Peripheral Line), 10 mEq, IntraVENous, PRN  sennosides-docusate sodium (SENOKOT-S) 8.6-50 MG tablet 1 tablet, 1 tablet, Oral, BID  sodium phosphate (FLEET) rectal enema 1 enema, 1 enema, Rectal, Daily PRN  aluminum & magnesium hydroxide-simethicone (MAALOX) 200-200-20 MG/5ML suspension 30 mL, 30 mL, Oral, Q6H PRN  ceFAZolin (ANCEF) 2,000 mg in sodium chloride 0.9 % 50 mL IVPB (mini-bag), 2,000 mg, IntraVENous, Q8H  acetaminophen (TYLENOL) tablet 650 mg, 650 mg, Oral, Q6H  traMADol (ULTRAM) tablet 50 mg, 50 mg, Oral, Q6H PRN **OR** traMADol (ULTRAM) tablet 100 mg, 100 mg, Oral, Q6H PRN  tiZANidine (ZANAFLEX) tablet 4 mg, 4 mg, Oral, Q8H PRN  apixaban (ELIQUIS) tablet 2.5 mg, 2.5 mg, Oral, Q12H  melatonin disintegrating tablet 5 mg, 5 mg, Oral, Nightly PRN  therapeutic multivitamin-minerals, , Oral, Daily  Allergies:  Sulfamethoxazole-trimethoprim    Social History:   TOBACCO:   reports that he quit smoking about 42 years ago. He has never used smokeless tobacco.  ETOH:   reports current alcohol use. Family History:   No family history on file. REVIEW OF SYSTEMS:  Review of systems not obtained due to patient factors - mental status    PHYSICAL EXAM:  VITALS:  BP (!) 179/93   Pulse 90   Temp 99 °F (37.2 °C) (Oral)   Resp 18   Ht 5' 8\" (1.727 m)   Wt 206 lb (93.4 kg)   SpO2 94%   BMI 31.32 kg/m²   TEMPERATURE:  Current - [unfilled]; Max - Temp (24hrs), Av.3 °F (36.8 °C), Min:97.6 °F (36.4 °C), Max:99 °F (37.2 °C) ; Patient moving all extremities but does not participate in graded motor exam  GCS  E- 3, opens to voice  V - 3, says isolated words  M - 5, does not follow commands    DATA:  Radiology Review:  I personally reviewed the CT and MRI L-spine which demonstrates degenerative changes with multiple areas of stenosis that could contribute to radiculopathy.  No fracture    IMPRESSION/RECOMMENDATIONS:      79 y/o man w/ dementia at baseline presents after fall with back pain and no altered mental status  -symptoms seem out of proportion to Lumbar spine findings on imaging  -recommend CT T-spine to rule out acute fracture  -If negative recommend medical management for pain    Christina Petty MD  Neurosurgery  [facility]  [phone]

## 2023-01-29 NOTE — PROGRESS NOTES
Hospitalist Progress Note      Name:  Ana Laura Oliva /Age/Sex: 5/3/1930  (80 y.o. male)   MRN & CSN:  [id number] & [id number] Admission Date/Time: 2023 12:57 PM   Location:  5522/5522-01 PCP: Gallo Bonilla Day: 3    Assessment and Plan:       R leg cellulitis vs CHF  Present with more swelling , erythema and pain of r leg  On iv cefazolin  Follow venous doppler and ECHO, BNP  Elevate legs  Pt/ot     2- acute lower back pain : MRI of back show moderate stenosis of L3-4   Consulted neurosurgery, advised to get CT T spine  Fall precaution    3- CKD : serum creatinine 1.7 his baseline   Will ask nephrology to see if can tolerate some diuresis for edema    Venous stasis dermatitis : elevate leg  R leg looks more erythematous and swollen than left   On iv AB   Venous doppler pending  He is on eliquis adjusted dose as per outpatient hematology  On lovenox for now as declines meds at times       Diet ADULT DIET; Regular   DVT Prophylaxis [] Lovenox, []  Heparin, [] SCDs, [] Ambulation   GI Prophylaxis [] PPI,  [] H2 Blocker,  [] Carafate,  [] Diet/Tube Feeds   Code Status Full Code   Disposition Patient requires continued admission due to    MDM [] Low, [] Moderate,[]  High  Patient's risk as above due to      History of Present Illness:   Seen with family at bedside, was restless, c/o back pain today and did not take his meds      Objective: Intake/Output Summary (Last 24 hours) at 2023 1130  Last data filed at 2023 1006  Gross per 24 hour   Intake 0 ml   Output 625 ml   Net -625 ml        Vitals:   Vitals:    23 1024   BP: (!) 170/92   Pulse: 89   Resp: 18   SpO2:      Physical Exam:   GEN Awake.  Alert , not in respiratory distress, not in pain  HEENT: PEERLA, , supple neck,   Chest: air entry equal bilaterally, no wheezing or crepitation  Heart: S1 and S2 heard, no murmur, no gallop or rub, regular rate  Abdomen: soft, ND , Nt, +BS  Extremities: no cyanosis, there is erythema , swelling and tenderness of right leg more than left leg ,  peripheral pulses audible  Neurology: alert, oriented x2, able to move 4 limbs    Medications:   Medications:    enoxaparin  1 mg/kg SubCUTAneous Daily    lidocaine  1 patch TransDERmal Daily    sodium chloride flush  5-40 mL IntraVENous 2 times per day    sennosides-docusate sodium  1 tablet Oral BID    ceFAZolin  2,000 mg IntraVENous Q8H    acetaminophen  650 mg Oral Q6H    [Held by provider] apixaban  2.5 mg Oral Q12H    therapeutic multivitamin-minerals   Oral Daily      Infusions:    sodium chloride 25 mL/hr at 01/28/23 1241     PRN Meds: haloperidol lactate, 5 mg, Q6H PRN  morphine, 2 mg, Q4H PRN  HYDROmorphone, 0.25 mg, Q4H PRN  sodium chloride flush, 5-40 mL, PRN  sodium chloride, , PRN  ondansetron, 4 mg, Q8H PRN   Or  ondansetron, 4 mg, Q6H PRN  polyethylene glycol, 17 g, Daily PRN  acetaminophen, 650 mg, Q6H PRN   Or  acetaminophen, 650 mg, Q6H PRN  magnesium sulfate, 2,000 mg, PRN  potassium chloride, 40 mEq, PRN   Or  potassium alternative oral replacement, 40 mEq, PRN   Or  potassium chloride, 10 mEq, PRN  sodium phosphate, 1 enema, Daily PRN  aluminum & magnesium hydroxide-simethicone, 30 mL, Q6H PRN  traMADol, 50 mg, Q6H PRN   Or  traMADol, 100 mg, Q6H PRN  tiZANidine, 4 mg, Q8H PRN  melatonin, 5 mg, Nightly PRN        Electronically signed by Jess Olmos MD on 1/29/2023 at 11:30 AM

## 2023-01-29 NOTE — PROGRESS NOTES
Attempted to to oral care on the pt but the pt refuses and spat at this time nurse and bit down on the oral sponge toothlet (he did let go), This RN also attached the pt to a purewick in case he does void on his own. I will continue to follow.   Humza Junior, JAYLIN

## 2023-01-29 NOTE — PROGRESS NOTES
Assessment complete, see flow sheet. I spoke to the pt's son Sowmya Caraballo who has been updated about his fathers confusion and the need for bilateral wrist restraints, the son Sowmya Caraballo understood the need for restraints and did talk to her dad on the phone with this nurses assistance. This nurse also completed mumtaz care and placed a new brief on the pt, did ROM exercises and assessed for any injury due to the restraints which none were noted at this time. The pt continues to be confused, he is oriented to the year and his name only. Both the PCA and this nurse offered the pt food for breakfast and drink but currently the pt has declined both times this morning. I will continue to follow throughout the shift. Bed alarm is on and call light in reach, pt also on camera for added safety.   Glendy Baer RN

## 2023-01-29 NOTE — PLAN OF CARE
Problem: Discharge Planning  Goal: Discharge to home or other facility with appropriate resources  Outcome: Progressing     Problem: Pain  Goal: Verbalizes/displays adequate comfort level or baseline comfort level  Outcome: Progressing     Problem: Safety - Adult  Goal: Free from fall injury  Outcome: Progressing     Problem: Safety - Medical Restraint  Goal: Remains free of injury from restraints (Restraint for Interference with Medical Device)  Description: INTERVENTIONS:  1. Determine that other, less restrictive measures have been tried or would not be effective before applying the restraint  2. Evaluate the patient's condition at the time of restraint application  3. Inform patient/family regarding the reason for restraint  4.  Q2H: Monitor safety, psychosocial status, comfort, nutrition and hydration  Outcome: Progressing  Flowsheets (Taken 1/29/2023 0426 by Rashard Washington RN)  Remains free of injury from restraints (restraint for interference with medical device):   Determine that other, less restrictive measures have been tried or would not be effective before applying the restraint   Evaluate the patient's condition at the time of restraint application   Every 2 hours: Monitor safety, psychosocial status, comfort, nutrition and hydration

## 2023-01-30 ENCOUNTER — APPOINTMENT (OUTPATIENT)
Dept: VASCULAR LAB | Age: 88
DRG: 551 | End: 2023-01-30
Payer: MEDICARE

## 2023-01-30 LAB
ALBUMIN SERPL-MCNC: 3.6 G/DL (ref 3.4–5)
ANION GAP SERPL CALCULATED.3IONS-SCNC: 16 MMOL/L (ref 3–16)
BACTERIA: ABNORMAL /HPF
BASOPHILS ABSOLUTE: 0 K/UL (ref 0–0.2)
BASOPHILS RELATIVE PERCENT: 0.3 %
BILIRUBIN URINE: NEGATIVE
BLOOD, URINE: ABNORMAL
BUN BLDV-MCNC: 24 MG/DL (ref 7–20)
CALCIUM SERPL-MCNC: 9.5 MG/DL (ref 8.3–10.6)
CHLORIDE BLD-SCNC: 103 MMOL/L (ref 99–110)
CLARITY: CLEAR
CO2: 21 MMOL/L (ref 21–32)
COLOR: YELLOW
CREAT SERPL-MCNC: 2 MG/DL (ref 0.8–1.3)
EOSINOPHILS ABSOLUTE: 0 K/UL (ref 0–0.6)
EOSINOPHILS RELATIVE PERCENT: 0.2 %
GFR SERPL CREATININE-BSD FRML MDRD: 31 ML/MIN/{1.73_M2}
GLUCOSE BLD-MCNC: 113 MG/DL (ref 70–99)
GLUCOSE URINE: NEGATIVE MG/DL
HCT VFR BLD CALC: 36.9 % (ref 40.5–52.5)
HEMOGLOBIN: 12.2 G/DL (ref 13.5–17.5)
KETONES, URINE: 40 MG/DL
LEUKOCYTE ESTERASE, URINE: NEGATIVE
LYMPHOCYTES ABSOLUTE: 0.6 K/UL (ref 1–5.1)
LYMPHOCYTES RELATIVE PERCENT: 8.6 %
MCH RBC QN AUTO: 32 PG (ref 26–34)
MCHC RBC AUTO-ENTMCNC: 33.2 G/DL (ref 31–36)
MCV RBC AUTO: 96.6 FL (ref 80–100)
MICROSCOPIC EXAMINATION: YES
MONOCYTES ABSOLUTE: 0.9 K/UL (ref 0–1.3)
MONOCYTES RELATIVE PERCENT: 12.3 %
MUCUS: ABNORMAL /LPF
NEUTROPHILS ABSOLUTE: 5.8 K/UL (ref 1.7–7.7)
NEUTROPHILS RELATIVE PERCENT: 78.6 %
NITRITE, URINE: NEGATIVE
PDW BLD-RTO: 13.9 % (ref 12.4–15.4)
PH UA: 6 (ref 5–8)
PHOSPHORUS: 2.6 MG/DL (ref 2.5–4.9)
PLATELET # BLD: 210 K/UL (ref 135–450)
PMV BLD AUTO: 7.9 FL (ref 5–10.5)
POTASSIUM SERPL-SCNC: 4.2 MMOL/L (ref 3.5–5.1)
PROTEIN UA: 30 MG/DL
RBC # BLD: 3.81 M/UL (ref 4.2–5.9)
RBC UA: ABNORMAL /HPF (ref 0–4)
SODIUM BLD-SCNC: 140 MMOL/L (ref 136–145)
SPECIFIC GRAVITY UA: 1.02 (ref 1–1.03)
URINE TYPE: ABNORMAL
UROBILINOGEN, URINE: 0.2 E.U./DL
WBC # BLD: 7.4 K/UL (ref 4–11)
WBC UA: ABNORMAL /HPF (ref 0–5)

## 2023-01-30 PROCEDURE — 93970 EXTREMITY STUDY: CPT

## 2023-01-30 PROCEDURE — 84156 ASSAY OF PROTEIN URINE: CPT

## 2023-01-30 PROCEDURE — 1200000000 HC SEMI PRIVATE

## 2023-01-30 PROCEDURE — 82570 ASSAY OF URINE CREATININE: CPT

## 2023-01-30 PROCEDURE — 36415 COLL VENOUS BLD VENIPUNCTURE: CPT

## 2023-01-30 PROCEDURE — 6360000002 HC RX W HCPCS: Performed by: INTERNAL MEDICINE

## 2023-01-30 PROCEDURE — 84166 PROTEIN E-PHORESIS/URINE/CSF: CPT

## 2023-01-30 PROCEDURE — 83970 ASSAY OF PARATHORMONE: CPT

## 2023-01-30 PROCEDURE — 6360000002 HC RX W HCPCS

## 2023-01-30 PROCEDURE — 81001 URINALYSIS AUTO W/SCOPE: CPT

## 2023-01-30 PROCEDURE — 85025 COMPLETE CBC W/AUTO DIFF WBC: CPT

## 2023-01-30 PROCEDURE — 84155 ASSAY OF PROTEIN SERUM: CPT

## 2023-01-30 PROCEDURE — 51798 US URINE CAPACITY MEASURE: CPT

## 2023-01-30 PROCEDURE — 84165 PROTEIN E-PHORESIS SERUM: CPT

## 2023-01-30 PROCEDURE — 80069 RENAL FUNCTION PANEL: CPT

## 2023-01-30 PROCEDURE — 99232 SBSQ HOSP IP/OBS MODERATE 35: CPT | Performed by: NURSE PRACTITIONER

## 2023-01-30 PROCEDURE — 2580000003 HC RX 258: Performed by: INTERNAL MEDICINE

## 2023-01-30 PROCEDURE — 82306 VITAMIN D 25 HYDROXY: CPT

## 2023-01-30 PROCEDURE — 6370000000 HC RX 637 (ALT 250 FOR IP): Performed by: INTERNAL MEDICINE

## 2023-01-30 RX ORDER — FUROSEMIDE 10 MG/ML
20 INJECTION INTRAMUSCULAR; INTRAVENOUS DAILY
Status: DISCONTINUED | OUTPATIENT
Start: 2023-01-30 | End: 2023-02-04

## 2023-01-30 RX ORDER — OLANZAPINE 5 MG/1
5 TABLET, ORALLY DISINTEGRATING ORAL 2 TIMES DAILY
Status: DISCONTINUED | OUTPATIENT
Start: 2023-01-30 | End: 2023-01-31

## 2023-01-30 RX ADMIN — SODIUM CHLORIDE, PRESERVATIVE FREE 10 ML: 5 INJECTION INTRAVENOUS at 08:29

## 2023-01-30 RX ADMIN — CEFAZOLIN 2000 MG: 2 INJECTION, POWDER, FOR SOLUTION INTRAMUSCULAR; INTRAVENOUS at 20:24

## 2023-01-30 RX ADMIN — MORPHINE SULFATE 2 MG: 2 INJECTION, SOLUTION INTRAMUSCULAR; INTRAVENOUS at 05:29

## 2023-01-30 RX ADMIN — FUROSEMIDE 20 MG: 10 INJECTION, SOLUTION INTRAMUSCULAR; INTRAVENOUS at 12:44

## 2023-01-30 RX ADMIN — OLANZAPINE 5 MG: 5 TABLET, ORALLY DISINTEGRATING ORAL at 22:50

## 2023-01-30 RX ADMIN — OLANZAPINE 5 MG: 5 TABLET, ORALLY DISINTEGRATING ORAL at 13:47

## 2023-01-30 RX ADMIN — CEFAZOLIN 2000 MG: 2 INJECTION, POWDER, FOR SOLUTION INTRAMUSCULAR; INTRAVENOUS at 12:56

## 2023-01-30 RX ADMIN — MORPHINE SULFATE 2 MG: 2 INJECTION, SOLUTION INTRAMUSCULAR; INTRAVENOUS at 09:54

## 2023-01-30 RX ADMIN — CEFAZOLIN 2000 MG: 2 INJECTION, POWDER, FOR SOLUTION INTRAMUSCULAR; INTRAVENOUS at 05:27

## 2023-01-30 RX ADMIN — ENOXAPARIN SODIUM 90 MG: 100 INJECTION SUBCUTANEOUS at 08:29

## 2023-01-30 ASSESSMENT — PAIN SCALES - PAIN ASSESSMENT IN ADVANCED DEMENTIA (PAINAD)
FACIALEXPRESSION: 1
BODYLANGUAGE: 2
BREATHING: 2
TOTALSCORE: 7
TOTALSCORE: 7
NEGVOCALIZATION: 1
BODYLANGUAGE: 2
CONSOLABILITY: 1
NEGVOCALIZATION: 1
CONSOLABILITY: 1
FACIALEXPRESSION: 1
BREATHING: 2

## 2023-01-30 ASSESSMENT — PAIN SCALES - GENERAL: PAINLEVEL_OUTOF10: 7

## 2023-01-30 NOTE — PROGRESS NOTES
Hospitalist Progress Note      Name:  Tito Teixeira /Age/Sex: 5/3/1930  (80 y.o. male)   MRN & CSN:  [id number] & [id number] Admission Date/Time: 2023 12:57 PM   Location:  06/[id number] PCP: Gallo Bonilla Day: 4    Assessment and Plan:     Acute metabolic encephalopathy:  Head CT was non acute  Stop narcotics, place on low dose zyprexa  Encourage oral intake, family interaction    R leg cellulitis and/or CHF  Present with more swelling , erythema and pain of r leg  On iv cefazolin. Improving after IV lasix. Follow venous doppler and ECHO  BNP is elevated  Elevate legs  Pt/ot     Acute lower back pain : MRI of back show moderate stenosis of L3-4   Consulted neurosurgery, advised to get CT T spine. No acute pathology found on spinal imaging. Fall precaution    CKD 3: serum creatinine ~ 1.7 his baseline. Has solitary kidney   Nephrology is following for gentle diuresis    Recurrent DVT/PE:  At home on eliquis, declines home meds here  On lovenox as substitute    Dispo: 2-3 days of inpatient stay pending cardiac work up and improvement in mental status      Diet ADULT DIET; Regular   DVT Prophylaxis [x] Lovenox, []  Heparin, [] SCDs, [] Ambulation   GI Prophylaxis [] PPI,  [] H2 Blocker,  [] Carafate,  [] Diet/Tube Feeds   Code Status Full Code   Disposition Patient requires continued admission due to ongoing AMS   MDM [] Low, [] Moderate,  High  Patient's risk as above[x] due to      History of Present Illness:   Patient was seen and examined, son is present, discussed plan of care with son and nursing. Patient was taking only tylenol prior to admission. Not used to narcotics. Etienne was replaced yesterday due to retention. Edema of LE much improved. Objective:      Intake/Output Summary (Last 24 hours) at 2023 1234  Last data filed at 2023 0956  Gross per 24 hour   Intake 60 ml   Output 2400 ml   Net -2340 ml        Vitals:   Vitals:    23 1015   BP: (!) 169/88 Pulse: 90   Resp: 16   Temp: 98.1 °F (36.7 °C)   SpO2: 98%     Physical Exam:   GEN Awake.  Alert , not in respiratory distress, not in pain  HEENT: PEERLA, , supple neck,   Chest: air entry equal bilaterally, no wheezing or crepitation  Heart: S1 and S2 heard, no murmur, no gallop or rub, regular rate  Abdomen: soft, ND , Nt, +BS  Extremities: no cyanosis, there is erythema , swelling and tenderness of right leg more than left leg ,  overall much improved  Neurology: patient is fidgeting and not following commands, mumbles    Medications:   Medications:    furosemide  20 mg IntraVENous Daily    OLANZapine zydis  5 mg Oral BID    enoxaparin  1 mg/kg SubCUTAneous Daily    lidocaine  1 patch TransDERmal Daily    sodium chloride flush  5-40 mL IntraVENous 2 times per day    sennosides-docusate sodium  1 tablet Oral BID    ceFAZolin  2,000 mg IntraVENous Q8H    acetaminophen  650 mg Oral Q6H    [Held by provider] apixaban  2.5 mg Oral Q12H    therapeutic multivitamin-minerals   Oral Daily      Infusions:    sodium chloride 25 mL/hr at 01/29/23 1251     PRN Meds: perflutren lipid microspheres, 1.5 mL, ONCE PRN  sodium chloride flush, 5-40 mL, PRN  sodium chloride, , PRN  ondansetron, 4 mg, Q8H PRN   Or  ondansetron, 4 mg, Q6H PRN  polyethylene glycol, 17 g, Daily PRN  acetaminophen, 650 mg, Q6H PRN   Or  acetaminophen, 650 mg, Q6H PRN  magnesium sulfate, 2,000 mg, PRN  potassium chloride, 40 mEq, PRN   Or  potassium alternative oral replacement, 40 mEq, PRN   Or  potassium chloride, 10 mEq, PRN  aluminum & magnesium hydroxide-simethicone, 30 mL, Q6H PRN  melatonin, 5 mg, Nightly PRN        Electronically signed by Doc Stewart MD on 1/30/2023 at 12:34 PM

## 2023-01-30 NOTE — PROGRESS NOTES
Son, Chary Villarreal, at bedside and provided with update on patient. Other son, Amna Rojo, in [place] and Chary Villarreal prefers to be the first one called with updates and will then update William. Chary Villarreal states patient is from independent living at [facility] and is wondering if patient can return but to assisted living side. Social work made aware and will speak with Chary Villarreal.

## 2023-01-30 NOTE — PLAN OF CARE
Problem: Pain  Goal: Verbalizes/displays adequate comfort level or baseline comfort level  Outcome: Progressing  Note: Pain managed via medication per MAR, frequent repositioning, hourly rounding. Problem: Safety - Medical Restraint  Goal: Remains free of injury from restraints (Restraint for Interference with Medical Device)  Description: INTERVENTIONS:  1. Determine that other, less restrictive measures have been tried or would not be effective before applying the restraint  2. Evaluate the patient's condition at the time of restraint application  3. Inform patient/family regarding the reason for restraint  4.  Q2H: Monitor safety, psychosocial status, comfort, nutrition and hydration  Outcome: Progressing  Note: Checks q2h, ROM, no injury noted under restraints, pt's behavior meets criteria of restraints

## 2023-01-30 NOTE — CONSULTS
[facility]  Palliative Medicine Consultation Note      Date Of Admission:1/27/2023  Date of consult: 01/30/23  Seen by [facility] in the past:  Yes    Recommendations:        Pt moans and mumbles but can't answer any questions or follow commands. I called his son Shaw Ma and left a message. Called son/HCPOA Ansley French and there was no answer. Will try again tomorrow. Addendum: Received a call back from pt's sons Ansley French and Shaw Ma. We discussed code status and they say he is DNR-CCA. They hope he'll recover in the coming days, and we talked about what their wishes would be if he is not recovering, whether they would want an NG tube or if they would lean towards transitioning to comfort care. They would like to revisit goals of care if he is not recovering in the coming days. He's bounced back in the past and they hope he will again. If not, they would consider hospice. Will continue to follow. 1. Goals of Care/Advanced Care planning/Code status: changed to [facility] per sons' wishes. They hope he'll start doing better in the coming days. They'd like to revisit goals of care conversation if he's not recovering and if we get to the point of needing to discuss NG tube. 2. Pain: pt is unable to report pain/discomfort but appears uncomfortable. Facial grimace and moaning noted. He has received 2 doses of morphine in the past 24 hours (total 4 mg). 3. SOB: pt is breathing comfortably on room air. 4. Acute encephalopathy/delirium: pt p/w confusion with known dementia at baseline, was able to answer some questions on initial presentation. Today he is mostly nonverbal and cannot follow commands. He's being treated for LLE cellulitis and has delirium requiring haldol, zyprexa, and bilateral wrist restraints. He was agitated and pulled head board off the bed per RN.    5. Disposition: TBD, d/c to [facility] when able    Reason for Consult:         [x]  Goals of Care  []  Code Status Discussion/Advanced Care Planning   [x] Psychosocial/Family Support  []  Symptom Management  []  Other (Specify)    Requesting Physician: Dr. Bergman Neighbor:  Confusion    History Obtained From:  electronic medical record    History of Present Illness:         Carlin Randall is a 80 y.o. male with PMH of dementia, bilateral lower extremity DVTs, chronic venous stasis who presented with confusion. He was admitted with left leg cellulitis and lower back pain. He has been agitated at times, requiring zyprexa and bilateral wrist restraints. CT and MRI showed degenerative changes with multiple areas of stenosis which could contribute to radiculopathy. Neurosurgery recommended CT T-Spine to rule out acute fracture, which was completed and was negative for fracture. Subjective:         Past Medical History:        Diagnosis Date    Adenocarcinoma of prostate ([facility]     Anxiety     Arthritis     Cancer ([facility]     skin    Cataract     CKD (chronic kidney disease) stage 3, GFR 30-59 ml/min (HCC)     Colon polyps     Dementia ([facility]     MRSA infection 03/13/2014    leg wound    Retroperitoneal sarcoma (HCC)     Severe hearing loss     Venous thromboembolism (VTE)        Past Surgical History:        Procedure Laterality Date    JOINT REPLACEMENT Right 2005    hip    KIDNEY REMOVAL Left     PROSTATE SURGERY         Current Medications:    Medications Prior to Admission: lidocaine 4 % external patch, Place 1 patch onto the skin daily  melatonin 5 MG TBDP disintegrating tablet, Take 1 tablet by mouth nightly as needed (insomnia)  apixaban starter pack (ELIQUIS DVT/PE STARTER PACK) 5 MG TBPK tablet, Take 1 tablet by mouth See Admin Instructions  clobetasol (TEMOVATE) 0.05 % cream, Apply topically 2 times daily Apply topically 2 times daily Monday-Friday to R Lower Leg Wound  diclofenac sodium (VOLTAREN) 1 % GEL, Apply 4 g topically 2 times daily  Multiple Vitamins-Minerals (MULTI VITAMIN/MINERALS) TABS, Take  by mouth.     Allergies: Sulfamethoxazole-trimethoprim    Social History:    TOBACCO: reports that he quit smoking about 42 years ago. He has never used smokeless tobacco.  ETOH:   reports current alcohol use. Patient currently lives in independent living at [facility]. Review of Systems -   Review of Systems: Unable to obtain due to mental status    Objective:        Physical Exam  Constitutional:       Appearance: He is ill-appearing. HENT:      Head: Normocephalic and atraumatic. Cardiovascular:      Rate and Rhythm: Normal rate and regular rhythm. Pulmonary:      Effort: Pulmonary effort is normal.      Breath sounds: Normal breath sounds. Abdominal:      General: Bowel sounds are normal.      Palpations: Abdomen is soft. Musculoskeletal:      Right lower leg: Edema present. Left lower leg: Edema present. Comments: Chronic vascular changes to BLE   Skin:     General: Skin is warm and dry. Neurological:      Comments: Pt moans and mumbles to stimuli, goes back to sleep quickly. He's unable to answer questions or follow commands        Palliative Performance Scale:  [] 60% Ambulation reduced; Significant disease; Can't do hobbies/housework; intake normal or reduced; occasional assist; LOC full/confusion  [] 50% Mainly sit/lie; Extensive disease; Can't do any work; Considerable assist; intake normal  Or reduced; LOC full/confusion  [x] 40% Mainly in bed; Extensive disease; Mainly assist; intake normal or reduced; occasional assist; LOC full/confusion  [] 30% Bed Bound; Extensive disease; Total care; intake reduced; LOC full/confusion  [] 20% Bed Bound; Extensive disease; Total care; intake minimal; Drowsy/coma  [] 10% Bed Bound; Extensive disease;  Total care; Mouth care only; Drowsy/coma  [] 0% Death      Vitals:    /68   Pulse 90   Temp 98.4 °F (36.9 °C) (Oral)   Resp 16   Ht 5' 8\" (1.727 m)   Wt 206 lb (93.4 kg)   SpO2 97%   BMI 31.32 kg/m²     Labs:    BMP:   Recent Labs     01/28/23  0923 01/29/23  0846 01/30/23  0634    140 140   K 4.6 4.0 4.2    104 103   CO2 24 19* 21   BUN 27* 23* 24*   CREATININE 1.7* 1.8* 2.0*   GLUCOSE 106* 107* 113*     CBC:   Recent Labs     01/28/23  0923 01/29/23  0846 01/30/23  0634   WBC 5.8 7.2 7.4   HGB 12.2* 13.5 12.2*   HCT 36.8* 40.2* 36.9*    206 210       LFT's:   Recent Labs     01/29/23  0846   AST 37   ALT 17   BILITOT 0.5   ALKPHOS 81     Troponin: No results for input(s): TROPONINI in the last 72 hours. BNP: No results for input(s): BNP in the last 72 hours. ABGs: No results for input(s): PHART, RDV5BRF, PO2ART in the last 72 hours. INR: No results for input(s): INR in the last 72 hours. U/A:  Recent Labs     01/30/23  1026   COLORU Yellow   PHUR 6.0   WBCUA 0-2   RBCUA 21-50*   MUCUS Rare*   BACTERIA 1+*   CLARITYU Clear   SPECGRAV 1.025   LEUKOCYTESUR Negative   UROBILINOGEN 0.2   BILIRUBINUR Negative   BLOODU LARGE*   GLUCOSEU Negative       VL Extremity Venous Bilateral         CT THORACIC SPINE WO CONTRAST   Final Result      1. Mild anterior wedging of T6, T7, and T8 without acute fracture plane. The findings are consistent with remote compression fractures and are unchanged from the prior study. No evidence of an acute fracture or traumatic subluxation. 2.  Moderate multilevel degenerative disc disease without bony spinal stenosis. 3.  The shape scoliosis. 4.  Small bilateral pleural effusions. CT ABDOMEN PELVIS WO CONTRAST Additional Contrast? None   Final Result      1. Limited without IV contrast but no noncontrast CT evidence of malignancy in the abdomen or pelvis. 2.  Trace bilateral pleural effusions. 3.  Status post left nephrectomy. 4.  Small focus of gas in the bladder is likely iatrogenic. 5.  Small fat-containing epigastric hernia. 6.  Advanced vascular calcifications. MRI LUMBAR SPINE WO CONTRAST   Final Result   1.  Moderate acquired stenosis at L4-5 related to combined discopathy and significant ligament flavum hypertrophy. This narrows the AP dimension of the thecal sac to approximately 6.7 mm.   2. Mild acquired narrowing of the central canal at L2-3 related to discopathy and ligament flavum hypertrophy   3. Mild left neural exit foraminal narrowing at L2-3, L4-5 related to discopathy and hyperostosis from facet arthrosis   4. Mild narrowing of the right neural extra foramina at L1-2 related to mostly posteriorly oriented endplate osteophyte at L1      CT Head W/O Contrast   Final Result   1. Mild atrophy. 2. Patchy areas of decreased white matter attenuation. The findings are nonspecific, but most likely represent chronic small vessel ischemic change. 3.  No evidence of an acute intracranial process. CT LUMBAR SPINE WO CONTRAST   Final Result      1. No acute osseous abnormality. 2.  Degenerative spondylosis/disc disease resulting in severe central canal stenosis at L2-L3 and mild central canal stenosis at other lumbar spine levels. XR TIBIA FIBULA RIGHT (2 VIEWS)   Final Result   1. No acute fracture or dislocation. Conclusion/Time spent:         Recommendations see above    Pt [phone], Family [phone]  Time spent with patient and/or family: 20 min  Time reviewing records: 10 min   Time communicating with staff: 5 min     A total of 35 minutes spent with the patient and family on unit greater than 50% in counseling regarding palliative care and in goals of care for the patient. Thank you to Dr. Juan Carlos Stockton for this consultation. We will continue to follow Mr. Saritha Tejada care as needed.     Henna Lamas, VIJAY  [address]

## 2023-01-30 NOTE — PROGRESS NOTES
Patient remains confused and combative, unable to obtain vitals at this time, will retry later, male purewick replaced, patient voided small amount before pulling off previous purewick.  Bilateral wrist restraints checked, patient repositioned, will continue to monitor

## 2023-01-30 NOTE — CARE COORDINATION
Case Management Assessment  Initial Evaluation    Date/Time of Evaluation: 1/30/2023 1:03 PM  Assessment Completed by: Vidhi Epps RN    If patient is discharged prior to next notation, then this note serves as note for discharge by case management. Patient Name: Stephany Mercer                   YOB: 1930  Diagnosis: Cellulitis of right lower extremity [T66.358]  Acute low back pain with disc symptoms, duration less than 6 weeks [M54.50, M51.9]  Acute back pain with radiculopathy [M54.10]                   Date / Time: 1/27/2023 12:57 PM    Patient Admission Status: Inpatient   Readmission Risk (Low < 19, Mod (19-27), High > 27): Readmission Risk Score: 13.6    Current PCP: Jesse Blunt, DO  PCP verified by CM? No    Chart Reviewed: Yes      History Provided by: Child/Family Deshaun Kwan)  Patient Orientation: Unable to Assess    Patient Cognition: Severely Impaired    Hospitalization in the last 30 days (Readmission):  No    If yes, Readmission Assessment in  Navigator will be completed.     Advance Directives:      Code Status: Full Code   Patient's Primary Decision Maker is: Named in [address] [place] [address]    Primary Decision Maker: Nasreen Licona - Child - [phone]    Discharge Planning:    Patient lives with: Alone Type of Home: Independent Living Shannon Michael)  Primary Care Giver: Self Deshaun Kwan)  Patient Support Systems include: Children   Current Financial resources: Medicare  Current community resources: ECF/Home Care  Current services prior to admission: Durable Medical Equipment            Current DME: Walker            Type of Home Care services:  OT, PT, Nursing Services    ADLS  Prior functional level: Assistance with the following:, Bathing, Dressing, Housework  Current functional level:      PT AM-PAC:   /24  OT AM-PAC: 14 /24    Family can provide assistance at DC: No  Would you like Case Management to discuss the discharge plan with any other family members/significant others, and if so, who? Plans to Return to Present Housing: Unknown at present  Other Identified Issues/Barriers to RETURNING to current housing: may need SNF  Potential Assistance needed at discharge: Ryan Ocampo            Potential DME:    Patient expects to discharge to: [facility] [address] for transportation at discharge: Family    Financial    Payor: Daphne Lundy / Plan: [address] HMO / Product Type: Medicare /     Does insurance require precert for SNF: Yes    Potential assistance Purchasing Medications: No  Meds-to-Beds request: Yes    No Pharmacies Listed    Notes:    Factors facilitating achievement of predicted outcomes: Family support    Barriers to discharge: in restraints, confused    Additional Case Management Notes:   CM spoke with patient's son Jesse Trujillo at bedside. Patient not oriented at this time, currently in restraints. Patient is from [address] [place] [address] at [facility] with [facility]. Jesse Trujillo states they have been talking with [facility] about moving pt to assisted living or SNF. CM left a voicemail for Marcelina at [facility] and faxed a referral to [address] [place] [address]. The Plan for Transition of Care is related to the following treatment goals of Cellulitis of right lower extremity [L03.115]  Acute low back pain with disc symptoms, duration less than 6 weeks [M54.50, M51.9]  Acute back pain with radiculopathy [J44.13]    IF APPLICABLE: The Patient and/or patient representative Noemi Talley and his family were provided with a choice of provider and agrees with the discharge plan. Freedom of choice list with basic dialogue that supports the patient's individualized plan of care/goals and shares the quality data associated with the providers was provided to: Patient Representative   Patient Representative Name: Jamie Dewittholly     The Patient and/or Patient Representative Agree with the Discharge Plan?  Yes    Roddy Beal RN  Case Management Department  Ph: [phone] Fax: [phone]

## 2023-01-30 NOTE — PROGRESS NOTES
Pt refusing morning medications and anything PO. Lovenox given. Night shift RN placed guzman d/t retention. Remains confused and in restraints. MD made aware of the above. Will continue to monitor.

## 2023-01-30 NOTE — PROGRESS NOTES
01/30/23 1434   Encounter Summary   Encounter Overview/Reason  Attempted Encounter   Service Provided For: Patient not available   Referral/Consult From: Allison   Last Encounter  01/30/23  (bhaskar  sleep)   Complexity of Encounter Low   Begin Time 1455   End Time  1400   Total Time Calculated 1385 min   Encounter    Type Initial Screen/Assessment   Assessment/Intervention/Outcome   Assessment Unable to assess  (sleeping?)   Staff Unknown Grounds, MA, [facility]

## 2023-01-30 NOTE — PROGRESS NOTES
Patient responding to voice but remains disoriented x4. VSS, on room air. Not able to follow commands. Moving all extremities spontaneously. Restraints remain in place d/t combative behavior and picking at lines. BLE elevated on pillows to reduce swelling. Not taking anything PO. Fluids and snacks encouraged throughout shift. No signs of nausea. Etienne in place with good output. Fall precautions in place. Bed locked in lowest position with alarm on. Hourly rounding. Will continue to monitor.

## 2023-01-30 NOTE — CONSULTS
[address] [place] [address] Nephrology   [facility]  [phone]  Nephrology Consult Note          Patient ID: Stephany Mercer  Referring/ Physician: Jessee Calles MD      HPI/Summary:   Stephany Mercer is being seen by nephrology for CKD and DIruesis. Patient is a 80 y.o. M who had a left nephrectomy in 2018. Before then, baseline creatinine was ~1.3. Current baseline is ~1.8. Patient is not far from baseline. Likely increased due to one-kidney function. 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 Please call with any questions or concerns. Mary Thomas MD  [facility]  \[facility]\""[facility]  [place], [address]  Fax: [phone]  Office: [phone]         CC/Reason for consult:   Reason for consult: CKD, Diuresis   Chief Complaint   Patient presents with    Back Pain    Leg Swelling     Pt reports to ED from [facility]. C/o back pain x 2 days. Per ems staff also report LLE has increased swelling with weeping edema            Review of Systems:   [address] All other remaining systems are negative. Constitutional:  fever, chills, weakness, weight change, fatigue,      Skin:  rash, pruritus, hair loss, bruising, dry skin, petechiae. Head, Face, Neck   headaches, swelling,  cervical adenopathy. Respiratory: shortness of breath, cough, or wheezing  Cardiovascular: chest pain, palpitations, dizzy, edema  Gastrointestinal: nausea, vomiting, diarrhea, constipation,belly pain    Yellow skin, blood in stool  Musculoskeletal:  back pain, muscle weakness, gait problems,       joint pain or swelling.   Genitourinary:  dysuria, poor urine flow, flank pain, blood in urine  Neurologic:  vertigo, TIA'S, syncope, seizures, focal weakness  Psychosocial:  insomnia, anxiety, or depression. Additional positive findings: -     PMH/SH/FH:    Medical Hx: reviewed and updated as appropriate  Past Medical History:   Diagnosis Date    Adenocarcinoma of prostate ([facility]     Anxiety     Arthritis     [facility]     skin    Cataract     CKD (chronic kidney disease) stage 3, GFR 30-59 ml/min (HCC)     Colon polyps     Dementia ([facility]     MRSA infection 2014    leg wound    Retroperitoneal sarcoma (HCC)     Severe hearing loss     Venous thromboembolism (VTE)          Surgical Hx: reviewed and updated as appropriate   has a past surgical history that includes Prostate surgery; joint replacement (Right, 2005); and Kidney removal (Left). Social Hx: reviewed and updated as appropriate  Social History     Tobacco Use    Smoking status: Former     Types: Cigarettes     Quit date: 3/13/1980     Years since quittin.9    Smokeless tobacco: Never    Tobacco comments:     pipe smoker    Substance Use Topics    Alcohol use: Yes     Comment: beer occasional        Family hx: reviewed and updated as appropriate  family history is not on file. Medications:   furosemide, 20 mg, Daily  OLANZapine zydis, 5 mg, BID  enoxaparin, 1 mg/kg, Daily  lidocaine, 1 patch, Daily  sodium chloride flush, 5-40 mL, 2 times per day  sennosides-docusate sodium, 1 tablet, BID  ceFAZolin, 2,000 mg, Q8H  acetaminophen, 650 mg, Q6H  [Held by provider] apixaban, 2.5 mg, Q12H  therapeutic multivitamin-minerals, , Daily       Sulfamethoxazole-trimethoprim    Allergies:    Allergies   Allergen Reactions    Sulfamethoxazole-Trimethoprim Rash     He claims he doesn't have allergies         Physical Exam/Objective:   Vitals:    23 1244   BP: (!) 161/80   Pulse:    Resp:    Temp:    SpO2:        Intake/Output Summary (Last 24 hours) at 2023 1250  Last data filed at 2023 0956  Gross per 24 hour   Intake 60 ml Output 2400 ml   Net -2340 ml         General appearance: in no acute distress, comfortable, disorientated  HEENT: no icterus, EOM intact, trachea midline. Neck : no masses, appears symmetrical and no JVD appreciated. Respiratory: Respiratory effort normal, bilateral equal chest rise. No wheeze, no crackles   Cardiovascular: Ausculation shows RRR and  Some edema   Abdomen: abdomen is soft, non distended, no masses, no pain with palpation. Musculoskeletal:  no joint swelling, no deformity, strength grossly normal. Venous stasis changes BL LE  Skin: no rashes, no induration, no tightening, no jaundice   Neuro:  Disorientated, moves all extremities spontaneously. Data:   CBC:   Recent Labs     01/28/23  0923 01/29/23  0846 01/30/23  0634   WBC 5.8 7.2 7.4   HGB 12.2* 13.5 12.2*   HCT 36.8* 40.2* 36.9*    206 210     BMP:    Recent Labs     01/28/23  0923 01/29/23  0846 01/30/23  0634    140 140   K 4.6 4.0 4.2    104 103   CO2 24 19* 21   BUN 27* 23* 24*   CREATININE 1.7* 1.8* 2.0*   GLUCOSE 106* 107* 113*   PHOS  --   --  2.6     Patient was seen and examined and the case was discussed with the resident. He acted as my scribe. I agree with the assessment and plan.     Thanks  Nephrology  [address]  Office: [phone]  Cell: [phone]  Fax: [phone]

## 2023-01-30 NOTE — PROGRESS NOTES
Consulted for Bilateral Lower Extremities. Pt has history of venous stasis. Bilateral lower extremities dry and flaky, scattered scabs. Applied moisturizing lotion to lower legs. Wound Care to sign off. Please re-consult for changes or deterioration.

## 2023-01-30 NOTE — PLAN OF CARE
Problem: Discharge Planning  Goal: Discharge to home or other facility with appropriate resources  1/29/2023 2238 by Dwight Fermin RN  Outcome: Progressing  1/29/2023 1554 by Kevyn Beebe RN  Outcome: Progressing     Problem: Pain  Goal: Verbalizes/displays adequate comfort level or baseline comfort level  1/29/2023 2238 by Dwight Fermin RN  Outcome: Progressing  1/29/2023 1554 by Kevyn Beebe RN  Outcome: Progressing     Problem: Safety - Adult  Goal: Free from fall injury  1/29/2023 2238 by Dwight Fermin RN  Outcome: Progressing  1/29/2023 1554 by Kevyn Beebe RN  Outcome: Progressing     Problem: Safety - Medical Restraint  Goal: Remains free of injury from restraints (Restraint for Interference with Medical Device)  Description: INTERVENTIONS:  1. Determine that other, less restrictive measures have been tried or would not be effective before applying the restraint  2. Evaluate the patient's condition at the time of restraint application  3. Inform patient/family regarding the reason for restraint  4. Q2H: Monitor safety, psychosocial status, comfort, nutrition and hydration  1/29/2023 2238 by Dwight Fermin RN  Outcome: Progressing  1/29/2023 1554 by Kevyn Beebe RN  Outcome: Progressing  Flowsheets (Taken 1/29/2023 0426 by Jaylyn Martell RN)  Remains free of injury from restraints (restraint for interference with medical device):   Determine that other, less restrictive measures have been tried or would not be effective before applying the restraint   Evaluate the patient's condition at the time of restraint application   Every 2 hours: Monitor safety, psychosocial status, comfort, nutrition and hydration     Problem: Confusion  Goal: Confusion, delirium, dementia, or psychosis is improved or at baseline  Description: INTERVENTIONS:  1.  Assess for possible contributors to thought disturbance, including medications, impaired vision or hearing, underlying metabolic abnormalities, dehydration, psychiatric diagnoses, and notify attending LIP  2. [place] high risk fall precautions, as indicated  3. Provide frequent short contacts to provide reality reorientation, refocusing and direction  4. Decrease environmental stimuli, including noise as appropriate  5. Monitor and intervene to maintain adequate nutrition, hydration, elimination, sleep and activity  6. If unable to ensure safety without constant attention obtain sitter and review sitter guidelines with assigned personnel  7. Initiate Psychosocial CNS and Spiritual Care consult, as indicated  Outcome: Progressing     Problem: Skin/Tissue Integrity  Goal: Absence of new skin breakdown  Description: 1. Monitor for areas of redness and/or skin breakdown  2. Assess vascular access sites hourly  3. Every 4-6 hours minimum:  Change oxygen saturation probe site  4. Every 4-6 hours:  If on nasal continuous positive airway pressure, respiratory therapy assess nares and determine need for appliance change or resting period.   Outcome: Progressing

## 2023-01-30 NOTE — PROGRESS NOTES
Physical Therapy/Occupational Therapy  Hold  Orders received and chart reviewed. Per d/w RN pt has been combative and is currently in restraints and very tired from medication, overall inappropriate for therapy at this time. Will attempt to evaluate pt at later time vs later date as appropriate and as schedule permits. RN aware.      Pj Sosa, PT, DPT   Geovanna Hampton OTR/L #[id number]

## 2023-01-31 ENCOUNTER — APPOINTMENT (OUTPATIENT)
Dept: GENERAL RADIOLOGY | Age: 88
DRG: 551 | End: 2023-01-31
Payer: MEDICARE

## 2023-01-31 LAB
ALBUMIN SERPL-MCNC: 3.4 G/DL (ref 3.4–5)
ANION GAP SERPL CALCULATED.3IONS-SCNC: 13 MMOL/L (ref 3–16)
BACTERIA BLD CULT: NORMAL
BASOPHILS ABSOLUTE: 0 K/UL (ref 0–0.2)
BASOPHILS RELATIVE PERCENT: 0.1 %
BUN BLDV-MCNC: 35 MG/DL (ref 7–20)
CALCIUM SERPL-MCNC: 9.6 MG/DL (ref 8.3–10.6)
CHLORIDE BLD-SCNC: 104 MMOL/L (ref 99–110)
CO2: 23 MMOL/L (ref 21–32)
CREAT SERPL-MCNC: 2.3 MG/DL (ref 0.8–1.3)
CREATININE URINE: 47 MG/DL (ref 39–259)
CULTURE, BLOOD 2: NORMAL
EOSINOPHILS ABSOLUTE: 0 K/UL (ref 0–0.6)
EOSINOPHILS RELATIVE PERCENT: 0 %
GFR SERPL CREATININE-BSD FRML MDRD: 26 ML/MIN/{1.73_M2}
GLUCOSE BLD-MCNC: 128 MG/DL (ref 70–99)
HCT VFR BLD CALC: 33.4 % (ref 40.5–52.5)
HEMOGLOBIN: 11.2 G/DL (ref 13.5–17.5)
LV EF: 50 %
LVEF MODALITY: NORMAL
LYMPHOCYTES ABSOLUTE: 0.7 K/UL (ref 1–5.1)
LYMPHOCYTES RELATIVE PERCENT: 9 %
MCH RBC QN AUTO: 32.4 PG (ref 26–34)
MCHC RBC AUTO-ENTMCNC: 33.5 G/DL (ref 31–36)
MCV RBC AUTO: 96.7 FL (ref 80–100)
MONOCYTES ABSOLUTE: 1.1 K/UL (ref 0–1.3)
MONOCYTES RELATIVE PERCENT: 14.2 %
NEUTROPHILS ABSOLUTE: 5.9 K/UL (ref 1.7–7.7)
NEUTROPHILS RELATIVE PERCENT: 76.7 %
PARATHYROID HORMONE INTACT: 51.5 PG/ML (ref 14–72)
PDW BLD-RTO: 14 % (ref 12.4–15.4)
PHOSPHORUS: 3.3 MG/DL (ref 2.5–4.9)
PLATELET # BLD: 185 K/UL (ref 135–450)
PMV BLD AUTO: 7.9 FL (ref 5–10.5)
POTASSIUM SERPL-SCNC: 3.8 MMOL/L (ref 3.5–5.1)
PROTEIN PROTEIN: 0.02 G/DL
PROTEIN PROTEIN: 17 MG/DL
PROTEIN PROTEIN: 19 MG/DL
PROTEIN/CREAT RATIO: 0.4 MG/DL
RBC # BLD: 3.45 M/UL (ref 4.2–5.9)
SODIUM BLD-SCNC: 140 MMOL/L (ref 136–145)
VITAMIN D 25-HYDROXY: 25.8 NG/ML
WBC # BLD: 7.6 K/UL (ref 4–11)

## 2023-01-31 PROCEDURE — 6370000000 HC RX 637 (ALT 250 FOR IP): Performed by: INTERNAL MEDICINE

## 2023-01-31 PROCEDURE — 6360000002 HC RX W HCPCS: Performed by: INTERNAL MEDICINE

## 2023-01-31 PROCEDURE — 71045 X-RAY EXAM CHEST 1 VIEW: CPT

## 2023-01-31 PROCEDURE — C8929 TTE W OR WO FOL WCON,DOPPLER: HCPCS

## 2023-01-31 PROCEDURE — 1200000000 HC SEMI PRIVATE

## 2023-01-31 PROCEDURE — 36415 COLL VENOUS BLD VENIPUNCTURE: CPT

## 2023-01-31 PROCEDURE — 85025 COMPLETE CBC W/AUTO DIFF WBC: CPT

## 2023-01-31 PROCEDURE — 80069 RENAL FUNCTION PANEL: CPT

## 2023-01-31 PROCEDURE — 2580000003 HC RX 258: Performed by: INTERNAL MEDICINE

## 2023-01-31 PROCEDURE — 6360000002 HC RX W HCPCS

## 2023-01-31 RX ORDER — OLANZAPINE 5 MG/1
2.5 TABLET, ORALLY DISINTEGRATING ORAL EVERY 12 HOURS PRN
Status: DISCONTINUED | OUTPATIENT
Start: 2023-01-31 | End: 2023-02-01

## 2023-01-31 RX ORDER — OLANZAPINE 5 MG/1
5 TABLET, ORALLY DISINTEGRATING ORAL NIGHTLY
Status: DISCONTINUED | OUTPATIENT
Start: 2023-02-01 | End: 2023-01-31

## 2023-01-31 RX ORDER — LINEZOLID 2 MG/ML
600 INJECTION, SOLUTION INTRAVENOUS EVERY 12 HOURS
Status: DISCONTINUED | OUTPATIENT
Start: 2023-01-31 | End: 2023-02-05

## 2023-01-31 RX ORDER — OLANZAPINE 5 MG/1
5 TABLET, ORALLY DISINTEGRATING ORAL NIGHTLY
Status: DISCONTINUED | OUTPATIENT
Start: 2023-01-31 | End: 2023-02-01

## 2023-01-31 RX ADMIN — ACETAMINOPHEN 650 MG: 325 TABLET, FILM COATED ORAL at 13:07

## 2023-01-31 RX ADMIN — SODIUM CHLORIDE, PRESERVATIVE FREE 10 ML: 5 INJECTION INTRAVENOUS at 09:07

## 2023-01-31 RX ADMIN — PIPERACILLIN AND TAZOBACTAM 4500 MG: 4; .5 INJECTION, POWDER, LYOPHILIZED, FOR SOLUTION INTRAVENOUS at 16:17

## 2023-01-31 RX ADMIN — FUROSEMIDE 20 MG: 10 INJECTION, SOLUTION INTRAMUSCULAR; INTRAVENOUS at 09:07

## 2023-01-31 RX ADMIN — LINEZOLID 600 MG: 600 INJECTION, SOLUTION INTRAVENOUS at 17:05

## 2023-01-31 RX ADMIN — PIPERACILLIN AND TAZOBACTAM 3375 MG: 3; .375 INJECTION, POWDER, LYOPHILIZED, FOR SOLUTION INTRAVENOUS at 20:27

## 2023-01-31 RX ADMIN — CEFAZOLIN 2000 MG: 2 INJECTION, POWDER, FOR SOLUTION INTRAMUSCULAR; INTRAVENOUS at 12:58

## 2023-01-31 RX ADMIN — ENOXAPARIN SODIUM 90 MG: 100 INJECTION SUBCUTANEOUS at 09:07

## 2023-01-31 RX ADMIN — OLANZAPINE 5 MG: 5 TABLET, ORALLY DISINTEGRATING ORAL at 20:29

## 2023-01-31 RX ADMIN — CEFAZOLIN 2000 MG: 2 INJECTION, POWDER, FOR SOLUTION INTRAMUSCULAR; INTRAVENOUS at 04:52

## 2023-01-31 ASSESSMENT — PAIN DESCRIPTION - LOCATION: LOCATION: OTHER (COMMENT)

## 2023-01-31 ASSESSMENT — PAIN SCALES - PAIN ASSESSMENT IN ADVANCED DEMENTIA (PAINAD)
FACIALEXPRESSION: 0
BREATHING: 0
BODYLANGUAGE: 0
TOTALSCORE: 0
NEGVOCALIZATION: 0
CONSOLABILITY: 0

## 2023-01-31 ASSESSMENT — PAIN DESCRIPTION - DESCRIPTORS: DESCRIPTORS: ACHING

## 2023-01-31 NOTE — PROGRESS NOTES
Physical Therapy Hold    Orders received, chart reviewed. Attempted therapy evaluation, pt very somnolent and only briefly responding with garbled responses not intelligible. Upon attempting to raise HOB to increase level of arousal, pt becoming agitated and crying in pain. Pt not following commands and not appropriate for therapy evaluations at this time. Will follow up as treatment schedule allows.      Catherine Ar, PT, DPT

## 2023-01-31 NOTE — CARE COORDINATION
Case Management Assessment           Daily Note                 Date/ Time of Note: 1/31/2023 9:57 AM         Note completed by: Adelaida Lomas RN    Patient Name: Catrachita Foss  YOB: 1930    Diagnosis:Cellulitis of right lower extremity [L03.115]  Acute low back pain with disc symptoms, duration less than 6 weeks [M54.50, M51.9]  Acute back pain with radiculopathy [M54.10]  Patient Admission Status: Inpatient    Date of Admission:1/27/2023 12:57 PM Length of Stay: 2 GLOS: GMLOS: 2.8    Current Plan of Care: in restraints, IV abx, IV lasix  ________________________________________________________________________________________  PT AM-PAC:   / 24 per last evaluation on: pending    OT AM-PAC: 14 / 24 per last evaluation on:     DME Needs for discharge: defer  ________________________________________________________________________________________  Discharge Plan: SNF: [facility]    Tentative discharge date: tbd    Current barriers to discharge: medical clearance      ________________________________________________________________________________________  Case Management Notes:  [facility] [facility] [address]. CM spoke with Faye Ray at [facility], they are able to take pt for SNF at discharge, pending a pre-cert. Patient will need to be restraint free for 24 hrs and therapy notes for pre-cert. Tiana Astorga and his family were provided with choice of provider; he and his family are in agreement with the discharge plan.     Care Transition Patient: Katie Lomas RN  [facility]  Case Management Department  Ph: [phone]  Fax: [phone]

## 2023-01-31 NOTE — PROGRESS NOTES
Son, Jesse Trujillo, at bedside. Patient awake, recognizes son. Alert to self only. Tolerating jello and ginger ale.

## 2023-01-31 NOTE — PROGRESS NOTES
[address] [place] [address] Nephrology   [facility]  [phone]  Nephrology Consult Note          Patient ID: Catalina Ro  Referring/ Physician: Darrell Fierro MD      HPI/Summary:   Catalina Ro is being seen by nephrology for CKD and DIruesis. Patient is a 80 y.o. M who had a left nephrectomy in 2018. Before then, baseline creatinine was ~1.3. 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 Please call with any questions or concerns. Scott Reveles MD  [facility]  [address]  [place], [address]  Fax: [phone]  Office: [phone]         CC/Reason for consult:   Reason for consult: CKD, Diuresis   Chief Complaint   Patient presents with    Back Pain    Leg Swelling     Pt reports to ED from [facility]. C/o back pain x 2 days. Per ems staff also report LLE has increased swelling with weeping edema            Review of Systems:   [address]. All other remaining systems are negative. Constitutional:  fever, chills, weakness, weight change, fatigue,      Skin:  rash, pruritus, hair loss, bruising, dry skin, petechiae. Head, Face, Neck   headaches, swelling,  cervical adenopathy. Respiratory: shortness of breath, cough, or wheezing  Cardiovascular: chest pain, palpitations, dizzy, edema  Gastrointestinal: nausea, vomiting, diarrhea, constipation,belly pain    Yellow skin, blood in stool  Musculoskeletal:  back pain, muscle weakness, gait problems,       joint pain or swelling.   Genitourinary:  dysuria, poor urine flow, flank pain, blood in urine  Neurologic:  vertigo, TIA'S, syncope, seizures, focal weakness  Psychosocial:  insomnia, anxiety, or depression. Additional positive findings: -     PMH/SH/FH:    Medical Hx: reviewed and updated as appropriate  Past Medical History:   Diagnosis Date    Adenocarcinoma of prostate ([facility] [address]     Anxiety     Arthritis     Cancer ([facility]     skin    Cataract     CKD (chronic kidney disease) stage 3, GFR 30-59 ml/min (HCC)     Colon polyps     Dementia ([facility] [address] [facility]     MRSA infection 2014    leg wound    Retroperitoneal sarcoma (HCC)     Severe hearing loss     Venous thromboembolism (VTE)          Surgical Hx: reviewed and updated as appropriate   has a past surgical history that includes Prostate surgery; joint replacement (Right, 2005); and Kidney removal (Left). Social Hx: reviewed and updated as appropriate  Social History     Tobacco Use    Smoking status: Former     Types: Cigarettes     Quit date: 3/13/1980     Years since quittin.9    Smokeless tobacco: Never    Tobacco comments:     pipe smoker    Substance Use Topics    Alcohol use: Yes     Comment: beer occasional        Family hx: reviewed and updated as appropriate  family history is not on file. Medications:   furosemide, 20 mg, Daily  OLANZapine zydis, 5 mg, BID  enoxaparin, 1 mg/kg, Daily  lidocaine, 1 patch, Daily  sodium chloride flush, 5-40 mL, 2 times per day  sennosides-docusate sodium, 1 tablet, BID  ceFAZolin, 2,000 mg, Q8H  acetaminophen, 650 mg, Q6H  [Held by provider] apixaban, 2.5 mg, Q12H  therapeutic multivitamin-minerals, , Daily     Sulfamethoxazole-trimethoprim    Allergies:    Allergies   Allergen Reactions    Sulfamethoxazole-Trimethoprim Rash     He claims he doesn't have allergies         Physical Exam/Objective:   Vitals:    23 0907   BP: (!) 157/80   Pulse:    Resp:    Temp:    SpO2:        Intake/Output Summary (Last 24 hours) at 2023 1027  Last data filed at 2023 1011  Gross per 24 hour   Intake 0 ml Output 1975 ml   Net -1975 ml           General appearance: in no acute distress, comfortable, disorientated  HEENT: no icterus, EOM intact, trachea midline. Neck : no masses, appears symmetrical and no JVD appreciated. Respiratory: Respiratory effort normal, bilateral equal chest rise. No wheeze, no crackles   Cardiovascular: Ausculation shows RRR and  Some edema   Abdomen: abdomen is soft, non distended, no masses, no pain with palpation. Musculoskeletal:  no joint swelling, no deformity, strength grossly normal. Venous stasis changes BL LE  Skin: no rashes, no induration, no tightening, no jaundice   Neuro:  Disorientated, moves all extremities spontaneously.        Data:   CBC:   Recent Labs     01/29/23  0846 01/30/23  0634 01/31/23  0636   WBC 7.2 7.4 7.6   HGB 13.5 12.2* 11.2*   HCT 40.2* 36.9* 33.4*    210 185       BMP:    Recent Labs     01/29/23  0846 01/30/23  0634 01/31/23  0636    140 140   K 4.0 4.2 3.8    103 104   CO2 19* 21 23   BUN 23* 24* 35*   CREATININE 1.8* 2.0* 2.3*   GLUCOSE 107* 113* 128*   PHOS  --  2.6 3.3         Thanks  Nephrology  [address], [address]  Office: [phone]    Fax: [phone]

## 2023-01-31 NOTE — PROGRESS NOTES
Patient remains oriented to self only. Not following commands. VSS, on room air. Has been free of restraints since removal, no combative behavior observed. Was able to eat jello and drink fluids with son in room. Took scheduled tylenol in jello and tolerated well. Etienne in place with adequate output. Fall precautions in place. Bed locked in lowest position with alarm on. Hourly rounding. Will continue to monitor.

## 2023-01-31 NOTE — PROGRESS NOTES
Hospitalist Progress Note      Name:  Renee Lala /Age/Sex: 5/3/1930  (80 y.o. male)   MRN & CSN:  [id number] & [id number] Admission Date/Time: 2023 12:57 PM   Location:  [facility]/8755-24 PCP: Gallo Bonilla Day: 5    Assessment and Plan:     Acute metabolic encephalopathy:  Head CT was non acute  Improving off narcotics. Added to [employer] oral intake, family interaction    R leg cellulitis and CHF  Present with more swelling , erythema and pain of r leg  On iv cefazolin. Improving after IV lasix. Venous doppler showed only chronic DVT, nothing acute  ECHO pending    Possible pneumonia:  Cough present, rhonchi, CXR personally reviewed- will add antibiotic coverage    Acute lower back pain : MRI of back show moderate stenosis of L3-4   Consulted neurosurgery, advised to get CT T spine. No acute pathology found on spinal imaging. Fall precaution    CKD 3: serum creatinine ~ 1.7 his baseline. Has solitary kidney   Nephrology is following for gentle diuresis    Recurrent DVT/PE:  At home on eliquis, declines home meds here  On lovenox as substitute    Dispo: 2-3 days of inpatient stay pending cardiac work up and improvement in mental status      Diet ADULT DIET; Regular   DVT Prophylaxis [x] Lovenox, []  Heparin, [] SCDs, [] Ambulation   GI Prophylaxis [] PPI,  [] H2 Blocker,  [] Carafate,  [] Diet/Tube Feeds   Code Status DNR-CCA   Disposition Patient requires continued admission due to ongoing AMS   MDM [] Low, [] Moderate,  High  Patient's risk as above[x] due to      History of Present Illness:   Patient is more awake, able to tell his name and recognized his son [employer]. Off restrains. Etienne is draining  Discussed plan of care with nursing      Objective:      Intake/Output Summary (Last 24 hours) at 2023 1225  Last data filed at 2023 1011  Gross per 24 hour   Intake 0 ml   Output 1975 ml   Net -1975 ml        Vitals:   Vitals:    23 1030   BP: 126/68 Pulse: 73   Resp: 16   Temp: 99.1 °F (37.3 °C)   SpO2: 92%     Physical Exam:   GEN Awake.  Alert , not in respiratory distress, not in pain  HEENT: PEERLA, , supple neck,   Chest: rhonchi present and diminished on the left  Heart: S1 and S2 heard, no murmur, no gallop or rub, regular rate  Abdomen: soft, ND , Nt, +BS  Extremities: resolved erythema and improved edema LE  Neurology: calm, cooperative, able to say his name    Medications:   Medications:    OLANZapine zydis  5 mg Oral Nightly    furosemide  20 mg IntraVENous Daily    enoxaparin  1 mg/kg SubCUTAneous Daily    lidocaine  1 patch TransDERmal Daily    sodium chloride flush  5-40 mL IntraVENous 2 times per day    sennosides-docusate sodium  1 tablet Oral BID    ceFAZolin  2,000 mg IntraVENous Q8H    acetaminophen  650 mg Oral Q6H    [Held by provider] apixaban  2.5 mg Oral Q12H    therapeutic multivitamin-minerals   Oral Daily      Infusions:    sodium chloride 25 mL/hr at 01/29/23 1251     PRN Meds: OLANZapine zydis, 2.5 mg, Q12H PRN  perflutren lipid microspheres, 1.5 mL, ONCE PRN  sodium chloride flush, 5-40 mL, PRN  sodium chloride, , PRN  ondansetron, 4 mg, Q8H PRN   Or  ondansetron, 4 mg, Q6H PRN  polyethylene glycol, 17 g, Daily PRN  acetaminophen, 650 mg, Q6H PRN   Or  acetaminophen, 650 mg, Q6H PRN  magnesium sulfate, 2,000 mg, PRN  potassium chloride, 40 mEq, PRN   Or  potassium alternative oral replacement, 40 mEq, PRN   Or  potassium chloride, 10 mEq, PRN  aluminum & magnesium hydroxide-simethicone, 30 mL, Q6H PRN  melatonin, 5 mg, Nightly PRN        Electronically signed by Macario Hart MD on 1/31/2023 at 12:25 PM

## 2023-01-31 NOTE — PROGRESS NOTES
Pharmacy Note - Renal Dosing and Extended Infusion Beta-Lactam Adjustment    Piperacillin/Tazobactam ordered for treatment of SSTI and PNA-HAP. Per [facility] Renal Dose Adjustment Policy and Extended Infusion Beta-Lactam Policy, Marylene Cleaves will be changed to 3375 mg EVERY 8 HOURS. Estimated Creatinine Clearance: Estimated Creatinine Clearance: 23 mL/min (A) (based on SCr of 2.3 mg/dL (H)). Dialysis Status, AJ, CKD: AJ  BMI: Body mass index is 30.74 kg/m². Rationale for Adjustment: Agent is renally eliminated and demonstrates time-dependent effect on bacterial eradication. Extended-infusion dosing strategy aims to enhance microbiologic and clinical efficacy. Pharmacy will continue to monitor renal function, cultures and sensitivities (where available) and adjust dose as necessary. Please call with any questions.   Dell Edouard, PharmD  Main Pharmacy: [id number]

## 2023-01-31 NOTE — PROGRESS NOTES
Patient asleep in bed. Restraints removed and order discontinued. No signs of injury or trauma to bilateral wrists. Patient moving extremities spontaneously. Son, Roslyn Smyth, called and updates given. Made aware that patient's restraints were removed. Will continue to monitor patient's behavior.

## 2023-02-01 LAB
ALBUMIN SERPL-MCNC: 3.2 G/DL (ref 3.4–5)
ANION GAP SERPL CALCULATED.3IONS-SCNC: 12 MMOL/L (ref 3–16)
BASOPHILS ABSOLUTE: 0 K/UL (ref 0–0.2)
BASOPHILS RELATIVE PERCENT: 0.2 %
BUN BLDV-MCNC: 39 MG/DL (ref 7–20)
CALCIUM SERPL-MCNC: 9.3 MG/DL (ref 8.3–10.6)
CHLORIDE BLD-SCNC: 101 MMOL/L (ref 99–110)
CO2: 26 MMOL/L (ref 21–32)
CREAT SERPL-MCNC: 2.2 MG/DL (ref 0.8–1.3)
EOSINOPHILS ABSOLUTE: 0 K/UL (ref 0–0.6)
EOSINOPHILS RELATIVE PERCENT: 0.3 %
GFR SERPL CREATININE-BSD FRML MDRD: 27 ML/MIN/{1.73_M2}
GLUCOSE BLD-MCNC: 139 MG/DL (ref 70–99)
HCT VFR BLD CALC: 36.2 % (ref 40.5–52.5)
HEMOGLOBIN: 12.2 G/DL (ref 13.5–17.5)
LYMPHOCYTES ABSOLUTE: 0.6 K/UL (ref 1–5.1)
LYMPHOCYTES RELATIVE PERCENT: 8 %
MCH RBC QN AUTO: 32.1 PG (ref 26–34)
MCHC RBC AUTO-ENTMCNC: 33.6 G/DL (ref 31–36)
MCV RBC AUTO: 95.6 FL (ref 80–100)
MONOCYTES ABSOLUTE: 0.8 K/UL (ref 0–1.3)
MONOCYTES RELATIVE PERCENT: 10.6 %
NEUTROPHILS ABSOLUTE: 6.1 K/UL (ref 1.7–7.7)
NEUTROPHILS RELATIVE PERCENT: 80.9 %
PDW BLD-RTO: 13.8 % (ref 12.4–15.4)
PHOSPHORUS: 2.4 MG/DL (ref 2.5–4.9)
PLATELET # BLD: 210 K/UL (ref 135–450)
PMV BLD AUTO: 7.9 FL (ref 5–10.5)
POTASSIUM SERPL-SCNC: 3.5 MMOL/L (ref 3.5–5.1)
RBC # BLD: 3.79 M/UL (ref 4.2–5.9)
SODIUM BLD-SCNC: 139 MMOL/L (ref 136–145)
WBC # BLD: 7.6 K/UL (ref 4–11)

## 2023-02-01 PROCEDURE — 6360000002 HC RX W HCPCS: Performed by: INTERNAL MEDICINE

## 2023-02-01 PROCEDURE — 97530 THERAPEUTIC ACTIVITIES: CPT

## 2023-02-01 PROCEDURE — 51798 US URINE CAPACITY MEASURE: CPT

## 2023-02-01 PROCEDURE — 6360000002 HC RX W HCPCS

## 2023-02-01 PROCEDURE — 1200000000 HC SEMI PRIVATE

## 2023-02-01 PROCEDURE — 6370000000 HC RX 637 (ALT 250 FOR IP): Performed by: INTERNAL MEDICINE

## 2023-02-01 PROCEDURE — 2580000003 HC RX 258: Performed by: INTERNAL MEDICINE

## 2023-02-01 PROCEDURE — 36415 COLL VENOUS BLD VENIPUNCTURE: CPT

## 2023-02-01 PROCEDURE — 97162 PT EVAL MOD COMPLEX 30 MIN: CPT

## 2023-02-01 PROCEDURE — 85025 COMPLETE CBC W/AUTO DIFF WBC: CPT

## 2023-02-01 PROCEDURE — 51702 INSERT TEMP BLADDER CATH: CPT

## 2023-02-01 PROCEDURE — 97535 SELF CARE MNGMENT TRAINING: CPT

## 2023-02-01 PROCEDURE — 6370000000 HC RX 637 (ALT 250 FOR IP): Performed by: STUDENT IN AN ORGANIZED HEALTH CARE EDUCATION/TRAINING PROGRAM

## 2023-02-01 PROCEDURE — 80069 RENAL FUNCTION PANEL: CPT

## 2023-02-01 PROCEDURE — 51701 INSERT BLADDER CATHETER: CPT

## 2023-02-01 RX ORDER — OLANZAPINE 5 MG/1
2.5 TABLET, ORALLY DISINTEGRATING ORAL NIGHTLY
Status: DISCONTINUED | OUTPATIENT
Start: 2023-02-01 | End: 2023-02-03

## 2023-02-01 RX ADMIN — LINEZOLID 600 MG: 600 INJECTION, SOLUTION INTRAVENOUS at 16:32

## 2023-02-01 RX ADMIN — SENNOSIDES AND DOCUSATE SODIUM 1 TABLET: 50; 8.6 TABLET ORAL at 09:41

## 2023-02-01 RX ADMIN — ACETAMINOPHEN 650 MG: 325 TABLET, FILM COATED ORAL at 20:22

## 2023-02-01 RX ADMIN — SODIUM CHLORIDE: 9 INJECTION, SOLUTION INTRAVENOUS at 14:20

## 2023-02-01 RX ADMIN — SODIUM CHLORIDE, PRESERVATIVE FREE 10 ML: 5 INJECTION INTRAVENOUS at 09:44

## 2023-02-01 RX ADMIN — ACETAMINOPHEN 650 MG: 325 TABLET, FILM COATED ORAL at 14:15

## 2023-02-01 RX ADMIN — SODIUM CHLORIDE, PRESERVATIVE FREE 5 ML: 5 INJECTION INTRAVENOUS at 22:27

## 2023-02-01 RX ADMIN — FUROSEMIDE 20 MG: 10 INJECTION, SOLUTION INTRAMUSCULAR; INTRAVENOUS at 09:41

## 2023-02-01 RX ADMIN — ENOXAPARIN SODIUM 90 MG: 100 INJECTION SUBCUTANEOUS at 14:14

## 2023-02-01 RX ADMIN — PIPERACILLIN AND TAZOBACTAM 3375 MG: 3; .375 INJECTION, POWDER, LYOPHILIZED, FOR SOLUTION INTRAVENOUS at 22:17

## 2023-02-01 RX ADMIN — MULTIPLE VITAMINS W/ MINERALS TAB 1 TABLET: TAB at 09:41

## 2023-02-01 RX ADMIN — PIPERACILLIN AND TAZOBACTAM 3375 MG: 3; .375 INJECTION, POWDER, LYOPHILIZED, FOR SOLUTION INTRAVENOUS at 14:21

## 2023-02-01 RX ADMIN — PIPERACILLIN AND TAZOBACTAM 3375 MG: 3; .375 INJECTION, POWDER, LYOPHILIZED, FOR SOLUTION INTRAVENOUS at 05:58

## 2023-02-01 RX ADMIN — SODIUM CHLORIDE: 9 INJECTION, SOLUTION INTRAVENOUS at 16:31

## 2023-02-01 RX ADMIN — SENNOSIDES AND DOCUSATE SODIUM 1 TABLET: 50; 8.6 TABLET ORAL at 20:22

## 2023-02-01 RX ADMIN — OLANZAPINE 2.5 MG: 5 TABLET, ORALLY DISINTEGRATING ORAL at 20:22

## 2023-02-01 RX ADMIN — ACETAMINOPHEN 650 MG: 325 TABLET, FILM COATED ORAL at 09:41

## 2023-02-01 RX ADMIN — LINEZOLID 600 MG: 600 INJECTION, SOLUTION INTRAVENOUS at 04:46

## 2023-02-01 ASSESSMENT — PAIN SCALES - GENERAL
PAINLEVEL_OUTOF10: 0

## 2023-02-01 ASSESSMENT — PAIN SCALES - PAIN ASSESSMENT IN ADVANCED DEMENTIA (PAINAD)
CONSOLABILITY: 0
FACIALEXPRESSION: 0
NEGVOCALIZATION: 0
TOTALSCORE: 0
BODYLANGUAGE: 0
BREATHING: 0

## 2023-02-01 NOTE — PLAN OF CARE
Problem: Discharge Planning  Goal: Discharge to home or other facility with appropriate resources  Outcome: Progressing  Flowsheets (Taken 1/31/2023 1930)  Discharge to home or other facility with appropriate resources: Identify barriers to discharge with patient and caregiver     Problem: Pain  Goal: Verbalizes/displays adequate comfort level or baseline comfort level  1/31/2023 2113 by Jossue Mason RN  Outcome: Progressing  1/31/2023 1901 by Sahil Rose RN  Outcome: Progressing  Flowsheets (Taken 1/31/2023 0637 by Jossue Mason RN)  Verbalizes/displays adequate comfort level or baseline comfort level: Encourage patient to monitor pain and request assistance  Note: Paint managed via medication per [facility]     Problem: Safety - Adult  Goal: Free from fall injury  1/31/2023 2113 by Jossue Mason RN  Outcome: Progressing  1/31/2023 1901 by Sahil Rose RN  Outcome: Progressing  Note: Fall precautions in place. Bed alarm on, call light/belongings within reach, bed in lowest position, hourly rounding, non slip socks. Problem: Confusion  Goal: Confusion, delirium, dementia, or psychosis is improved or at baseline  Description: INTERVENTIONS:  1. Assess for possible contributors to thought disturbance, including medications, impaired vision or hearing, underlying metabolic abnormalities, dehydration, psychiatric diagnoses, and notify attending LIP  2. [place] high risk fall precautions, as indicated  3. Provide frequent short contacts to provide reality reorientation, refocusing and direction  4. Decrease environmental stimuli, including noise as appropriate  5. Monitor and intervene to maintain adequate nutrition, hydration, elimination, sleep and activity  6. If unable to ensure safety without constant attention obtain sitter and review sitter guidelines with assigned personnel  7.  Initiate Psychosocial CNS and Spiritual Care consult, as indicated  Outcome: Progressing  Flowsheets (Taken 1/31/2023 1930)  Effect of thought disturbance (confusion, delirium, dementia, or psychosis) are managed with adequate functional status: Assess for contributors to thought disturbance, including medications, impaired vision or hearing, underlying metabolic abnormalities, dehydration, psychiatric diagnoses, notify LIP     Problem: Skin/Tissue Integrity  Goal: Absence of new skin breakdown  Description: 1. Monitor for areas of redness and/or skin breakdown  2. Assess vascular access sites hourly  3. Every 4-6 hours minimum:  Change oxygen saturation probe site  4. Every 4-6 hours:  If on nasal continuous positive airway pressure, respiratory therapy assess nares and determine need for appliance change or resting period.   Outcome: Progressing

## 2023-02-01 NOTE — PLAN OF CARE
Problem: Pain  Goal: Verbalizes/displays adequate comfort level or baseline comfort level  Outcome: Progressing  Flowsheets (Taken 1/31/2023 0637 by Kalli Duenas RN)  Verbalizes/displays adequate comfort level or baseline comfort level: Encourage patient to monitor pain and request assistance  Note: Paint managed via medication per [facility]     Problem: Safety - Adult  Goal: Free from fall injury  Outcome: Progressing  Note: Fall precautions in place. Bed alarm on, call light/belongings within reach, bed in lowest position, hourly rounding, non slip socks.

## 2023-02-01 NOTE — PROGRESS NOTES
Hospitalist Progress Note      Name:  Yanci Duvall /Age/Sex: 5/3/1930  (80 y.o. male)   MRN & CSN:  [id number] & [id number] Admission Date/Time: 2023 12:57 PM   Location:  1588/0129-39 PCP: [facility]o [facility] Day: 6    Assessment and Plan:     Acute metabolic encephalopathy:  Head CT was non acute  Improving off narcotics but far from baseline. Added to [employer] oral intake, family interaction    R leg cellulitis and CHF  Present with more swelling , erythema and pain of r leg  Treated with cefazolin. Improving after IV lasix. Venous doppler showed only chronic DVT, nothing acute  ECHO with diastolic dysfunction    Possible pneumonia:  Cough present, rhonchi, CXR with possible infiltrate left lower  Started on Zosyn and linezolid  Follow-up MRSA swab    Acute lower back pain : MRI of back show moderate stenosis of L3-4   Consulted neurosurgery, advised to get CT T spine. No acute pathology found on spinal imaging. Fall precaution, Tylenol for pain control    CKD 3: serum creatinine ~ 1.7 his baseline. Has solitary kidney   Nephrology is following for gentle diuresis    Recurrent DVT/PE:  At home on eliquis, declines home meds here  On lovenox as substitute until able to tolerate pills well    Dispo: 2 days of inpatient stay pending improvement in mental status      Diet ADULT DIET; Regular   DVT Prophylaxis [x] Lovenox, []  Heparin, [] SCDs, [] Ambulation   GI Prophylaxis [] PPI,  [] H2 Blocker,  [] Carafate,  [] Diet/Tube Feeds   Code Status DNR-CCA   Disposition Patient requires continued admission due to ongoing AMS   MDM [] Low, [] Moderate,  High  Patient's risk as above[x] due to      History of Present Illness:   Seen with nursing and family at bedside, patient has been out of restraints for over 24 hours. He still appears to have delirium region for things in the air, speech not making sense at times. Able to say his name but does not follow commands well. No cough present during visit. Discussed plan of care with family, nursing and social work      Objective: Intake/Output Summary (Last 24 hours) at 2/1/2023 1631  Last data filed at 2/1/2023 1400  Gross per 24 hour   Intake --   Output 1425 ml   Net -1425 ml        Vitals:   Vitals:    02/01/23 1430   BP: 130/84   Pulse: 70   Resp: 16   Temp: 98 °F (36.7 °C)   SpO2: 92%     Physical Exam:   GEN Awake.  Alert , not in respiratory distress, not in pain  HEENT: PEERLA, , supple neck,   Chest: rhonchi present and diminished on the left  Heart: S1 and S2 heard, no murmur, no gallop or rub, regular rate  Abdomen: soft, ND , Nt, +BS  Extremities: resolved erythema and improved edema LE  Neurology: calm, cooperative, able to say his name    Medications:   Medications:    OLANZapine zydis  5 mg Oral Nightly    linezolid  600 mg IntraVENous Q12H    piperacillin-tazobactam  3,375 mg IntraVENous Q8H    furosemide  20 mg IntraVENous Daily    enoxaparin  1 mg/kg SubCUTAneous Daily    lidocaine  1 patch TransDERmal Daily    sodium chloride flush  5-40 mL IntraVENous 2 times per day    sennosides-docusate sodium  1 tablet Oral BID    acetaminophen  650 mg Oral Q6H    [Held by provider] apixaban  2.5 mg Oral Q12H    therapeutic multivitamin-minerals   Oral Daily      Infusions:    sodium chloride 25 mL/hr at 02/01/23 1420     PRN Meds: OLANZapine zydis, 2.5 mg, Q12H PRN  perflutren lipid microspheres, 1.5 mL, ONCE PRN  sodium chloride flush, 5-40 mL, PRN  sodium chloride, , PRN  ondansetron, 4 mg, Q8H PRN   Or  ondansetron, 4 mg, Q6H PRN  polyethylene glycol, 17 g, Daily PRN  acetaminophen, 650 mg, Q6H PRN   Or  acetaminophen, 650 mg, Q6H PRN  magnesium sulfate, 2,000 mg, PRN  potassium chloride, 40 mEq, PRN   Or  potassium alternative oral replacement, 40 mEq, PRN   Or  potassium chloride, 10 mEq, PRN  aluminum & magnesium hydroxide-simethicone, 30 mL, Q6H PRN  melatonin, 5 mg, Nightly PRN        Electronically signed by Cayden Calhoun Jose Nolasco MD on 2/1/2023 at 4:31 PM

## 2023-02-01 NOTE — PROGRESS NOTES
[address] [place] [address] Nephrology   [facility]  [phone]  Nephrology Consult Note          Patient ID: Yeyo Joes  Referring/ Physician: Nicky Garcia MD      HPI/Summary:   Yeyo Jose is being seen by nephrology for CKD and DIruesis. Patient is a 80 y.o. M who had a left nephrectomy in 2018. Before then, baseline creatinine was ~1.3. Creatinine is 1.8 > 2.2 post diuresis   BP is better   Urine is 750 ml      Plan:     Continue 20 Lasix IV Daily. Edema is improving   Urine Protein/creatinine is 400 mg / day   SPEP  UPEP  Poor overall prognosis. 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 Please call with any questions or concerns. Marta Calle MD  [facility] Nephrology  [address]  [place], [address]  Fax: [phone]  Office: [phone]         CC/Reason for consult:   Reason for consult: CKD, Diuresis   Chief Complaint   Patient presents with    Back Pain    Leg Swelling     Pt reports to ED from [facility]. C/o back pain x 2 days. Per ems staff also report LLE has increased swelling with weeping edema            Review of Systems:   [address]. All other remaining systems are negative. Constitutional:  fever, chills, weakness, weight change, fatigue,      Skin:  rash, pruritus, hair loss, bruising, dry skin, petechiae. Head, Face, Neck   headaches, swelling,  cervical adenopathy.      Respiratory: shortness of breath, cough, or wheezing  Cardiovascular: chest pain, palpitations, dizzy, edema  Gastrointestinal: nausea, vomiting, diarrhea, constipation,belly pain    Yellow skin, blood in stool  Musculoskeletal:  back pain, muscle weakness, gait problems,       joint pain or swelling. Genitourinary:  dysuria, poor urine flow, flank pain, blood in urine  Neurologic:  vertigo, TIA'S, syncope, seizures, focal weakness  Psychosocial:  insomnia, anxiety, or depression. Additional positive findings: -     PMH/SH/FH:    Medical Hx: reviewed and updated as appropriate  Past Medical History:   Diagnosis Date    Adenocarcinoma of prostate ([address]     Anxiety     Arthritis     Cancer ([facility]     skin    Cataract     CKD (chronic kidney disease) stage 3, GFR 30-59 ml/min (HCC)     Colon polyps     Dementia ([facility]     MRSA infection 2014    leg wound    Retroperitoneal sarcoma (HCC)     Severe hearing loss     Venous thromboembolism (VTE)          Surgical Hx: reviewed and updated as appropriate   has a past surgical history that includes Prostate surgery; joint replacement (Right, 2005); and Kidney removal (Left). Social Hx: reviewed and updated as appropriate  Social History     Tobacco Use    Smoking status: Former     Types: Cigarettes     Quit date: 3/13/1980     Years since quittin.9    Smokeless tobacco: Never    Tobacco comments:     pipe smoker    Substance Use Topics    Alcohol use: Yes     Comment: beer occasional        Family hx: reviewed and updated as appropriate  family history is not on file. Medications:   OLANZapine zydis, 5 mg, Nightly  linezolid, 600 mg, Q12H  piperacillin-tazobactam, 3,375 mg, Q8H  furosemide, 20 mg, Daily  enoxaparin, 1 mg/kg, Daily  lidocaine, 1 patch, Daily  sodium chloride flush, 5-40 mL, 2 times per day  sennosides-docusate sodium, 1 tablet, BID  acetaminophen, 650 mg, Q6H  [Held by provider] apixaban, 2.5 mg, Q12H  therapeutic multivitamin-minerals, , Daily     Morphine and Sulfamethoxazole-trimethoprim    Allergies:    Allergies   Allergen Reactions    Morphine Other (See Comments)     Confusion, delirium    Sulfamethoxazole-Trimethoprim Rash     He claims he doesn't have allergies         Physical Exam/Objective:   Vitals:    23 1053 BP: 121/81   Pulse: (!) 114   Resp: 16   Temp: 98.3 °F (36.8 °C)   SpO2: 91%       Intake/Output Summary (Last 24 hours) at 2/1/2023 1150  Last data filed at 1/31/2023 2300  Gross per 24 hour   Intake 60 ml   Output 625 ml   Net -565 ml           General appearance: in no acute distress, comfortable, disorientated  HEENT: no icterus, EOM intact, trachea midline. Neck : no masses, appears symmetrical and no JVD appreciated. Respiratory: Respiratory effort normal, bilateral equal chest rise. No wheeze, no crackles   Cardiovascular: Ausculation shows RRR and  Some edema   Abdomen: abdomen is soft, non distended, no masses, no pain with palpation. Musculoskeletal:  no joint swelling, no deformity, strength grossly normal. Venous stasis changes BL LE  Skin: no rashes, no induration, no tightening, no jaundice   Neuro:  Disorientated, moves all extremities spontaneously.        Data:   CBC:   Recent Labs     01/30/23  0634 01/31/23  0636 02/01/23  0746   WBC 7.4 7.6 7.6   HGB 12.2* 11.2* 12.2*   HCT 36.9* 33.4* 36.2*    185 210       BMP:    Recent Labs     01/30/23  0634 01/31/23  0636 02/01/23  0746    140 139   K 4.2 3.8 3.5    104 101   CO2 21 23 26   BUN 24* 35* 39*   CREATININE 2.0* 2.3* 2.2*   GLUCOSE 113* 128* 139*   PHOS 2.6 3.3 2.4*         Thanks  Nephrology  [address], [address]  Office: [phone]    Fax: [phone]

## 2023-02-01 NOTE — PROGRESS NOTES
Physical Therapy  Facility/Department: [place] [address]  Physical Therapy Initial Assessment/Treatment    Name: Catrachita Foss  : 5/3/1930  MRN: [id number]  Date of Service: 2023    Discharge Recommendations: Catrachita Foss scored a 8/24 on the AM-PAC short mobility form. Current research shows that an AM-PAC score of 17 or less is typically not associated with a discharge to the patient's home setting. Based on the patient's AM-PAC score and their current functional mobility deficits, it is recommended that the patient have 3-5 sessions per week of Physical Therapy at d/c to increase the patient's independence. Please see assessment section for further patient specific details. If patient discharges prior to next session this note will serve as a discharge summary. Please see below for the latest assessment towards goals. PT Equipment Recommendations  Equipment Needed: No  Other: defer      Patient Diagnosis(es): The encounter diagnosis was Cellulitis of right lower extremity. 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 Past Surgical History:  has a past surgical history that includes Prostate surgery; joint replacement (Right, ); and Kidney removal (Left). Assessment   Body Structures, Functions, Activity Limitations Requiring Skilled Therapeutic Intervention: Decreased functional mobility   Assessment: Patient continues to present with intelligeable speech during the full session. At times able to understand some speech. Pt is requiring two person max assistance for all mobility, transfers and use of lift equipment to get him to the chair. Unknown prior baseline due to cognition.  PT is rec the patient have continued skilled IP therapy. Will continue to follow. Treatment Diagnosis: Impaired functional mobility 2/2 cognition  Therapy Prognosis: Fair  Decision Making: Medium Complexity  Requires PT Follow-Up: Yes  Activity Tolerance  Activity Tolerance: Patient tolerated treatment well     Plan   Physcial Therapy Plan  General Plan:  (2-5)  Current Treatment Recommendations: Strengthening, Balance training, Gait training, Stair training, Functional mobility training, Transfer training, Endurance training  Safety Devices  Type of Devices: Left in chair, Call light within reach, Chair alarm in place, All fall risk precautions in place, Nurse notified     Restrictions  Position Activity Restriction  Other position/activity restrictions: up with assist     Subjective   General  Chart Reviewed: Yes  Patient assessed for rehabilitation services?: Yes  Additional Pertinent Hx: 79 y/o pt admitted to ED with c/o frequent falls and weeping LEs. Found with B LE cellulitis. PMHx includes: anxiety, arthritis, CA, CKD, dementia, MRSA, hearing loss, venous thromboembolism. Subjective  Subjective: Pt found supine in bed.  Sleeping, easily arsouable to verbal stimulus         Social/Functional History  Social/Functional History  Lives With: Alone  Type of Home: Facility ([facility])  Home Layout: One level  Home Access: Ramped entrance, Elevator  Bathroom Shower/Tub: Walk-in shower  Bathroom Toilet: Handicap height  Bathroom Equipment: Grab bars in shower, Built-in shower seat, Hand-held shower  Home Equipment: Diana Hopper, 4 wheeled  Has the patient had two or more falls in the past year or any fall with injury in the past year?: Yes  ADL Assistance: Independent  Homemaking Responsibilities: No (facility staff)  Ambulation Assistance: Independent (with rollator down to dining room)  Transfer Assistance: Independent  Active : No  Patient's  Info: family  Additional Comments: Daughter in law arrived during session, corroborated above information  Vision/Hearing       Cognition         Objective   Heart Rate: (!) 114  Heart Rate Source: Monitor  BP: 121/81  BP Location: Right upper arm  BP Method: Automatic  Patient Position: Semi fowlers  MAP (Calculated): 94  Resp: 16  SpO2: 91 %  O2 Device: None (Room air)                             Bed mobility  Supine to Sit: Maximum assistance;2 Person assistance  Scooting: Maximal assistance;2 Person assistance  Transfers  Sit to Stand: 2 Person Assistance;Maximum Assistance (To joceline wolff, partial stand to RW)  Stand to Sit: 2 Person Assistance;Maximum Assistance  Bed to Chair: Dependent/Total Rossy )                    OutComes Score                                                  AM-PAC Score  AM-PAC Inpatient Mobility Raw Score : 8 (02/01/23 1330)  AM-PAC Inpatient T-Scale Score : 28.52 (02/01/23 1330)  Mobility Inpatient CMS 0-100% Score: 86.62 (02/01/23 1330)  Mobility Inpatient CMS G-Code Modifier : CM (02/01/23 1330)          Tinneti Score       Goals  Short Term Goals  Time Frame for Short Term Goals: discharge  Short Term Goal 1: Patient will perform all bed mobility Ryan  Short Term Goal 2: Patient will be able to demo a full sit<>stand to RW Min A  Short Term Goal 3: Patient will be able to perform a stand pivot transfer Min A with RW  Patient Goals   Patient Goals : unable to state       Education  Patient Education  Education Given To: Patient  Education Provided: Role of Therapy;Transfer Training  Education Method: Demonstration  Barriers to Learning: Cognition  Education Outcome: Continued education needed      Therapy Time   Individual Concurrent Group Co-treatment   Time In 1145         Time Out 1223         Minutes 38          Timed Code Treatment Minutes:   23    Total Treatment Minutes:  [address] [place] [address]

## 2023-02-01 NOTE — CARE COORDINATION
Case Management Assessment           Daily Note                 Date/ Time of Note: 2/1/2023 1:05 PM         Note completed by: Dev Jolly RN    Patient Name: Renee Lala  YOB: 1930    Diagnosis:Cellulitis of right lower extremity [L03.115]  Acute low back pain with disc symptoms, duration less than 6 weeks [M54.50, M51.9]  Acute back pain with radiculopathy [M54.10]  Patient Admission Status: Inpatient    Date of Admission:1/27/2023 12:57 PM Length of Stay: 3 GLOS: GMLOS: 4.4      ________________________________________________________________________________________  PT AM-PAC:   / 24 per last evaluation on: pending     OT AM-PAC: 14 / 24 per last evaluation on: 1/31    DME Needs for discharge: defer  ________________________________________________________________________________________  Discharge Plan: SNF: tbd    Tentative discharge date: 2-3 days    Current barriers to discharge: medical clearance    Referrals completed: SNF: tbd    Resources/ information provided: SNF List  ________________________________________________________________________________________  Case Management Notes:CM spoke with patient's son Michael Rome at bedside. He and his siblings are wanting pt to go to SNF. They do not want [facility]. CM emailed a SNF list to Michael Rome. They will review it and get back to CM with choices. Will need a pre-cert once accepted. Socorro Javier and his family were provided with choice of provider; he and his family are in agreement with the discharge plan.     Care Transition Patient: Katie Jolly RN  [facility]  Case Management Department  Ph: [phone]  Fax: [phone]

## 2023-02-01 NOTE — PROGRESS NOTES
Occupational Therapy  Occupational Therapy  Daily Treatment Note  Patient Name: Sowmya Bravo  MRN: [id number]    Assessment:   Pt limited by confusion, pain and decreased activity tolerance. Pt following less than 50% of commands. Transfer completed with Max A x2 and use of LAHTI stedy. Pt would benefit from inpt OT for maximizing functional independence and safety    Discharge Recommendations:   Sowmya Bravo scored a 8/24 on the AM-PAC ADL Inpatient form. Current research shows that an AM-PAC score of 17 or less is typically not associated with a discharge to the patient's home setting. Based on the patient's AM-PAC score and their current ADL deficits, it is recommended that the patient have 3-5 sessions per week of Occupational Therapy at d/c to increase the patient's independence. Please see assessment section for further patient specific details. If patient discharges prior to next session this note will serve as a discharge summary. Please see below for the latest assessment towards goals. Equipment Needs:    Chart Reviewed: Yes       Other position/activity restrictions: up with assist     Additional Pertinent Hx: 79 y/o pt admitted to ED with c/o frequent falls and weeping LEs. Found with B LE cellulitis. PMHx includes: anxiety, arthritis, CA, CKD, dementia, MRSA, hearing loss, venous thromboembolism. Diagnosis: R LE cellulitis  Treatment Diagnosis: decreased ADLs and transfers secondary to cellulitis    Subjective: Pt met supine in bed with mostly confused conversation but overall cooperative with therapy session. Pt following less than 50% of commands. Pain: Pain in RLE with movement.  Pt repositioned to comfort    Social/Functional History  Lives With: Alone  Type of Home: Facility [facility])  Home Layout: One level  Home Access: Ramped entrance, Elevator  Bathroom Shower/Tub: Walk-in shower  Bathroom Toilet: Handicap height  Bathroom Equipment: Grab bars in shower, Built-in shower seat, Hand-held shower  Home Equipment: Teo Langford, 4 wheeled  Has the patient had two or more falls in the past year or any fall with injury in the past year?: Yes  ADL Assistance: Independent  Homemaking Responsibilities: No (facility staff)  Ambulation Assistance: Independent (with rollator down to dining room)  Transfer Assistance: Independent  Active : No  Patient's  Info: family  Additional Comments: Daughter in law arrived during session, corroborated above information  Prior Function  ADL Assistance: Independent  Ambulation Assistance: Independent (with rollator down to dining room)  Transfer Assistance: Independent  Additional Comments: Daughter in law arrived during session, corroborated above information    Objective:    Cognition/Orientation:  Dementia at baseline, Disoriented x4      Bed mobility   Supine to sit: Max A x2  Scooting: Max A scooting to EOB and Max A x2 scooting back in chair    Functional Mobility   Sit to Stand: Max A x2 from [facility]OB to [facility] and from recliner chair to [address] [place] [address] for partial stance. Pt unable to come to full stance  Stand to Sit: Max A x2  Bed to Chair Transfer: Dependent upon LAHTI stedy tranfer from EOB to recliner chair    Pt sitting EOB with Max A progressing to CGA      ADLs   Grooming: Mod A washing hands and face with cloth with heavy cues and assist for completion of task. Mod A for combing hair  Bathing: Washing hair with Mod - Max A - pt able to briefly dry and scrub hair  LB dressing: Dependent donning socks       Activity Tolerance:  Pt limited by cognition, pain and decreased activity tolerance    Patient Education:   Activity promotion, Role of OT - no learning noted    Safety Devices in Place:  Pt left seated in recliner chair with alarm on and call light inreach.          Therapy Time:   Individual Concurrent Group Co-treatment   Time In  1147         Time Out  1225         Minutes  38           Timed Code Treatment Minutes:  38    Total Treatment Minutes: Timed Code Treatment Minutes: Total Treatment Time:     Goals: (as determined and assessed by primary OT)   Short Term Goals  Time Frame for Short Term Goals: by dc  Short Term Goal 1: Pt will complete LB dressing with min A - ongoing  Short Term Goal 2: Pt will complete toileting with min A - ongoing  Short Term Goal 3: Pt will complete standing level grooming with min A - ongoing         Plan:      Times Per Week: 2-5   Times Per Day: Once a day    If patient is discharged prior to next treatment, this note will serve as the discharge summary.       [address] [place] [address], Ne, LAINEZ/L

## 2023-02-02 LAB
ALBUMIN SERPL-MCNC: 3 G/DL (ref 3.1–4.9)
ALBUMIN SERPL-MCNC: 3 G/DL (ref 3.4–5)
ALPHA-1-GLOBULIN: 0.4 G/DL (ref 0.2–0.4)
ALPHA-2-GLOBULIN: 0.9 G/DL (ref 0.4–1.1)
ANION GAP SERPL CALCULATED.3IONS-SCNC: 11 MMOL/L (ref 3–16)
BETA GLOBULIN: 1.6 G/DL (ref 0.9–1.6)
BUN BLDV-MCNC: 44 MG/DL (ref 7–20)
CALCIUM SERPL-MCNC: 9.2 MG/DL (ref 8.3–10.6)
CHLORIDE BLD-SCNC: 103 MMOL/L (ref 99–110)
CO2: 26 MMOL/L (ref 21–32)
CREAT SERPL-MCNC: 2.2 MG/DL (ref 0.8–1.3)
GAMMA GLOBULIN: 0.6 G/DL (ref 0.6–1.8)
GFR SERPL CREATININE-BSD FRML MDRD: 27 ML/MIN/{1.73_M2}
GLUCOSE BLD-MCNC: 113 MG/DL (ref 70–99)
PHOSPHORUS: 2.6 MG/DL (ref 2.5–4.9)
POTASSIUM SERPL-SCNC: 3.7 MMOL/L (ref 3.5–5.1)
SODIUM BLD-SCNC: 140 MMOL/L (ref 136–145)
SPE/IFE INTERPRETATION: NORMAL
TOTAL PROTEIN: 6.5 G/DL (ref 6.4–8.2)
URINE ELECTROPHORESIS INTERP: NORMAL

## 2023-02-02 PROCEDURE — 6370000000 HC RX 637 (ALT 250 FOR IP): Performed by: STUDENT IN AN ORGANIZED HEALTH CARE EDUCATION/TRAINING PROGRAM

## 2023-02-02 PROCEDURE — 2580000003 HC RX 258: Performed by: INTERNAL MEDICINE

## 2023-02-02 PROCEDURE — 6370000000 HC RX 637 (ALT 250 FOR IP): Performed by: INTERNAL MEDICINE

## 2023-02-02 PROCEDURE — 51798 US URINE CAPACITY MEASURE: CPT

## 2023-02-02 PROCEDURE — 1200000000 HC SEMI PRIVATE

## 2023-02-02 PROCEDURE — 80069 RENAL FUNCTION PANEL: CPT

## 2023-02-02 PROCEDURE — 6360000002 HC RX W HCPCS: Performed by: INTERNAL MEDICINE

## 2023-02-02 PROCEDURE — 36415 COLL VENOUS BLD VENIPUNCTURE: CPT

## 2023-02-02 PROCEDURE — 6360000002 HC RX W HCPCS

## 2023-02-02 PROCEDURE — 51702 INSERT TEMP BLADDER CATH: CPT

## 2023-02-02 RX ORDER — ACETAMINOPHEN 325 MG/1
650 TABLET ORAL 4 TIMES DAILY
Status: DISCONTINUED | OUTPATIENT
Start: 2023-02-02 | End: 2023-02-07 | Stop reason: HOSPADM

## 2023-02-02 RX ADMIN — LINEZOLID 600 MG: 600 INJECTION, SOLUTION INTRAVENOUS at 04:26

## 2023-02-02 RX ADMIN — PIPERACILLIN AND TAZOBACTAM 3375 MG: 3; .375 INJECTION, POWDER, LYOPHILIZED, FOR SOLUTION INTRAVENOUS at 22:02

## 2023-02-02 RX ADMIN — ACETAMINOPHEN 650 MG: 325 TABLET, FILM COATED ORAL at 22:01

## 2023-02-02 RX ADMIN — LINEZOLID 600 MG: 600 INJECTION, SOLUTION INTRAVENOUS at 16:33

## 2023-02-02 RX ADMIN — MULTIPLE VITAMINS W/ MINERALS TAB 1 TABLET: TAB at 09:44

## 2023-02-02 RX ADMIN — PIPERACILLIN AND TAZOBACTAM 3375 MG: 3; .375 INJECTION, POWDER, LYOPHILIZED, FOR SOLUTION INTRAVENOUS at 14:30

## 2023-02-02 RX ADMIN — SODIUM CHLORIDE, PRESERVATIVE FREE 10 ML: 5 INJECTION INTRAVENOUS at 22:01

## 2023-02-02 RX ADMIN — SODIUM CHLORIDE, PRESERVATIVE FREE 10 ML: 5 INJECTION INTRAVENOUS at 09:44

## 2023-02-02 RX ADMIN — SENNOSIDES AND DOCUSATE SODIUM 1 TABLET: 50; 8.6 TABLET ORAL at 22:01

## 2023-02-02 RX ADMIN — ACETAMINOPHEN 650 MG: 325 TABLET, FILM COATED ORAL at 16:33

## 2023-02-02 RX ADMIN — PIPERACILLIN AND TAZOBACTAM 3375 MG: 3; .375 INJECTION, POWDER, LYOPHILIZED, FOR SOLUTION INTRAVENOUS at 07:17

## 2023-02-02 RX ADMIN — FUROSEMIDE 20 MG: 10 INJECTION, SOLUTION INTRAMUSCULAR; INTRAVENOUS at 09:43

## 2023-02-02 RX ADMIN — ENOXAPARIN SODIUM 90 MG: 100 INJECTION SUBCUTANEOUS at 09:44

## 2023-02-02 RX ADMIN — ACETAMINOPHEN 650 MG: 325 TABLET, FILM COATED ORAL at 14:25

## 2023-02-02 RX ADMIN — SENNOSIDES AND DOCUSATE SODIUM 1 TABLET: 50; 8.6 TABLET ORAL at 09:43

## 2023-02-02 RX ADMIN — ACETAMINOPHEN 650 MG: 325 TABLET ORAL at 09:45

## 2023-02-02 ASSESSMENT — PAIN SCALES - GENERAL
PAINLEVEL_OUTOF10: 0
PAINLEVEL_OUTOF10: 5

## 2023-02-02 ASSESSMENT — PAIN DESCRIPTION - DESCRIPTORS: DESCRIPTORS: DISCOMFORT

## 2023-02-02 ASSESSMENT — PAIN - FUNCTIONAL ASSESSMENT: PAIN_FUNCTIONAL_ASSESSMENT: PREVENTS OR INTERFERES SOME ACTIVE ACTIVITIES AND ADLS

## 2023-02-02 ASSESSMENT — PAIN DESCRIPTION - ORIENTATION
ORIENTATION: RIGHT;LOWER
ORIENTATION: UPPER;RIGHT

## 2023-02-02 ASSESSMENT — PAIN DESCRIPTION - LOCATION
LOCATION: BACK;LEG
LOCATION: LEG;BACK

## 2023-02-02 NOTE — PLAN OF CARE
Problem: Discharge Planning  Goal: Discharge to home or other facility with appropriate resources  2/2/2023 0527 by Analia Connell RN  Outcome: Progressing     Problem: Pain  Goal: Verbalizes/displays adequate comfort level or baseline comfort level  2/2/2023 0527 by Analia Connell RN  Outcome: Progressing     Problem: Safety - Adult  Goal: Free from fall injury  2/2/2023 0527 by Analia Connell RN  Outcome: Progressing     Problem: Confusion  Goal: Confusion, delirium, dementia, or psychosis is improved or at baseline  Description: INTERVENTIONS:  1. Assess for possible contributors to thought disturbance, including medications, impaired vision or hearing, underlying metabolic abnormalities, dehydration, psychiatric diagnoses, and notify attending LIP  2. [place] high risk fall precautions, as indicated  3. Provide frequent short contacts to provide reality reorientation, refocusing and direction  4. Decrease environmental stimuli, including noise as appropriate  5. Monitor and intervene to maintain adequate nutrition, hydration, elimination, sleep and activity  6. If unable to ensure safety without constant attention obtain sitter and review sitter guidelines with assigned personnel  7.  Initiate Psychosocial CNS and Spiritual Care consult, as indicated  2/2/2023 0304 by Analia Connell RN  Outcome: Not Progressing  Flowsheets (Taken 2/2/2023 0526)  Effect of thought disturbance (confusion, delirium, dementia, or psychosis) are managed with adequate functional status:   Assess for contributors to thought disturbance, including medications, impaired vision or hearing, underlying metabolic abnormalities, dehydration, psychiatric diagnoses, notify [facility] high risk fall precautions, as indicated   Provide frequent short contacts to provide reality reorientation, refocusing and direction  2/1/2023 1935 by Xena Day RN  Outcome: Progressing

## 2023-02-02 NOTE — PROGRESS NOTES
Speech Language Pathology    Received evaluation order, reviewed chart, spoke with RN. Attempted to work with patient, however he's currently very somnolent and unable to remain sufficiently alert to participate. Will plan to re-attempt at later time. Roxy Nogueira [place] .[id number]  Pg.  # [phone]

## 2023-02-02 NOTE — PROGRESS NOTES
Hospitalist Progress Note      Name:  Tk Joseph /Age/Sex: 5/3/1930  (80 y.o. male)   MRN & CSN:  [id number] & [id number] Admission Date/Time: 2023 12:57 PM   Location:  [id number] PCP: Gallo Bonilla Day: 7    Assessment and Plan:     Acute metabolic encephalopathy:  Head CT was non acute  Improving off narcotics but far from baseline. Added to [employer] oral intake, family interaction  Considered brain MRI holding off given improvement  Low dose zyprexa nightly due to sundowning/agitation    R leg cellulitis and CHF  Present with more swelling , erythema and pain of r leg  Treated with cefazolin. Improving after IV lasix. Venous doppler showed only chronic DVT, nothing acute  ECHO with diastolic dysfunction    Possible pneumonia:  Cough present, rhonchi, CXR with possible infiltrate left lower  Cont  Zosyn and linezolid  Follow-up MRSA swab- reordered    Acute lower back pain : MRI of back show moderate stenosis of L3-4   Consulted neurosurgery, advised to get CT T spine. No acute pathology found on spinal imaging. Fall precaution, Tylenol for pain control    CKD 3: serum creatinine ~ 1.7 his baseline. Has solitary kidney   Nephrology is following for gentle diuresis    Recurrent DVT/PE:  At home on eliquis, declines home meds here  On lovenox as substitute until able to tolerate pills well    Urinary retention:  Failed voiding trials. H/o remote prostatectomy per family  Will start flomax and consult urology for further recs. Dispo: 2 days of inpatient stay pending improvement in mental status and placement. Okay to start pre cert once have facility. Diet ADULT DIET;  Regular   DVT Prophylaxis [x] Lovenox, []  Heparin, [] SCDs, [] Ambulation   GI Prophylaxis [] PPI,  [] H2 Blocker,  [] Carafate,  [] Diet/Tube Feeds   Code Status DNR-CCA   Disposition Patient requires continued admission due to ongoing AMS   MDM [] Low, [] Moderate,  High  Patient's risk as above[x] due to      History of Present Illness:   Discussed with nursing and family- patient was gradually improving yesterday, started to sundown in the evening. Slept okay. Not requiring restrains, but failed voiding trial.     Eating a bit better today. Discussed with SW- will start pre cert once facility is chosen. Objective: Intake/Output Summary (Last 24 hours) at 2/2/2023 1149  Last data filed at 2/2/2023 1130  Gross per 24 hour   Intake 390 ml   Output 1400 ml   Net -1010 ml        Vitals:   Vitals:    02/02/23 1130   BP: (!) 143/87   Pulse: 80   Resp: 16   Temp: 98 °F (36.7 °C)   SpO2: 94%     Physical Exam:   GEN Awake.  Alert , not in respiratory distress, not in pain  HEENT: PEERLA, , supple neck,   Chest: rhonchi present and diminished on the left  Heart: S1 and S2 heard, no murmur, no gallop or rub, regular rate  Abdomen: soft, ND , Nt, +BS  Extremities: resolved erythema and improved edema LE  Neurology: calm, cooperative, able to say his name    Medications:   Medications:    acetaminophen  650 mg Oral 4x Daily    OLANZapine zydis  2.5 mg Oral Nightly    linezolid  600 mg IntraVENous Q12H    piperacillin-tazobactam  3,375 mg IntraVENous Q8H    furosemide  20 mg IntraVENous Daily    enoxaparin  1 mg/kg SubCUTAneous Daily    lidocaine  1 patch TransDERmal Daily    sodium chloride flush  5-40 mL IntraVENous 2 times per day    sennosides-docusate sodium  1 tablet Oral BID    [Held by provider] apixaban  2.5 mg Oral Q12H    therapeutic multivitamin-minerals   Oral Daily      Infusions:    sodium chloride 25 mL/hr at 02/01/23 1631     PRN Meds: perflutren lipid microspheres, 1.5 mL, ONCE PRN  sodium chloride flush, 5-40 mL, PRN  sodium chloride, , PRN  ondansetron, 4 mg, Q8H PRN   Or  ondansetron, 4 mg, Q6H PRN  polyethylene glycol, 17 g, Daily PRN  acetaminophen, 650 mg, Q6H PRN   Or  acetaminophen, 650 mg, Q6H PRN  magnesium sulfate, 2,000 mg, PRN  potassium chloride, 40 mEq, PRN Or  potassium alternative oral replacement, 40 mEq, PRN   Or  potassium chloride, 10 mEq, PRN  aluminum & magnesium hydroxide-simethicone, 30 mL, Q6H PRN        Electronically signed by Xander Gibson MD on 2/2/2023 at 11:49 AM

## 2023-02-02 NOTE — PROGRESS NOTES
Patient remains oriented to self only. Not following commands. VSS, on room air. Pt. Couldn't void on his own so. guzman's was placed at 2:30 pm  by urologist. Was able to eat  2 apple sauce and some eggs. medications done as per [facility]. Fall precautions in place, call light within reach. Bed locked in lowest position with alarm on.  Will continue to monitor

## 2023-02-02 NOTE — PROGRESS NOTES
[address] [place] [address] Nephrology   [facility]  [phone]  Nephrology Consult Note          Patient ID: Tavon Frausto  Referring/ Physician: Rinku Montemayor MD      HPI/Summary:     Tavon Frausto is being seen by nephrology for CKD and DIruesis. Patient is a 80 y.o. M who had a left nephrectomy in 2018. Before then, baseline creatinine was ~1.3. Creatinine is 1.8 > 2.2 post diuresis   BP is better   Urine is 1400 ml  Labs are pending       Plan:     Continue 20 Lasix IV Daily. Edema is improving   Urine Protein/creatinine is 400 mg / day   SPEP  UPEP  Poor overall prognosis. 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 Please call with any questions or concerns. Roger Lozano MD  [facility]  [address] [facility], [address]  Fax: [phone]  Office: [phone]         CC/Reason for consult:   Reason for consult: CKD, Diuresis   Chief Complaint   Patient presents with    Back Pain    Leg Swelling     Pt reports to ED from [facility]. C/o back pain x 2 days. Per ems staff also report LLE has increased swelling with weeping edema            Review of Systems:   [address] All other remaining systems are negative. Constitutional:  fever, chills, weakness, weight change, fatigue,      Skin:  rash, pruritus, hair loss, bruising, dry skin, petechiae. Head, Face, Neck   headaches, swelling,  cervical adenopathy.      Respiratory: shortness of breath, cough, or wheezing  Cardiovascular: chest pain, palpitations, dizzy, edema  Gastrointestinal: nausea, vomiting, diarrhea, constipation,belly pain    Yellow skin, blood in stool  Musculoskeletal:  back pain, muscle weakness, gait problems,       joint pain or swelling. Genitourinary:  dysuria, poor urine flow, flank pain, blood in urine  Neurologic:  vertigo, TIA'S, syncope, seizures, focal weakness  Psychosocial:  insomnia, anxiety, or depression. Additional positive findings: -     PMH/SH/FH:    Medical Hx: reviewed and updated as appropriate  Past Medical History:   Diagnosis Date    Adenocarcinoma of prostate ([facility]     Anxiety     Arthritis     Cancer ([facility]     skin    Cataract     CKD (chronic kidney disease) stage 3, GFR 30-59 ml/min (HCC)     Colon polyps     D[facility]     MRSA infection 2014    leg wound    Retroperitoneal sarcoma (HCC)     Severe hearing loss     Venous thromboembolism (VTE)          Surgical Hx: reviewed and updated as appropriate   has a past surgical history that includes Prostate surgery; joint replacement (Right, 2005); and Kidney removal (Left). Social Hx: reviewed and updated as appropriate  Social History     Tobacco Use    Smoking status: Former     Types: Cigarettes     Quit date: 3/13/1980     Years since quittin.9    Smokeless tobacco: Never    Tobacco comments:     pipe smoker    Substance Use Topics    Alcohol use: Yes     Comment: beer occasional        Family hx: reviewed and updated as appropriate  family history is not on file. Medications:   acetaminophen, 650 mg, 4x Daily  OLANZapine zydis, 2.5 mg, Nightly  linezolid, 600 mg, Q12H  piperacillin-tazobactam, 3,375 mg, Q8H  furosemide, 20 mg, Daily  enoxaparin, 1 mg/kg, Daily  lidocaine, 1 patch, Daily  sodium chloride flush, 5-40 mL, 2 times per day  sennosides-docusate sodium, 1 tablet, BID  [Held by provider] apixaban, 2.5 mg, Q12H  therapeutic multivitamin-minerals, , Daily     Morphine and Sulfamethoxazole-trimethoprim    Allergies:    Allergies   Allergen Reactions    Morphine Other (See Comments)     Confusion, delirium    Sulfamethoxazole-Trimethoprim Rash     He claims he doesn't have allergies         Physical Exam/Objective:   Vitals: 02/02/23 0700   BP: 126/74   Pulse: 88   Resp: 18   Temp: 98.6 °F (37 °C)   SpO2: 90%       Intake/Output Summary (Last 24 hours) at 2/2/2023 0930  Last data filed at 2/2/2023 0300  Gross per 24 hour   Intake 390 ml   Output 1400 ml   Net -1010 ml           General appearance: in no acute distress, comfortable, disorientated  HEENT: no icterus, EOM intact, trachea midline. Neck : no masses, appears symmetrical and no JVD appreciated. Respiratory: Respiratory effort normal, bilateral equal chest rise. No wheeze, no crackles   Cardiovascular: Ausculation shows RRR and  Some edema   Abdomen: abdomen is soft, non distended, no masses, no pain with palpation. Musculoskeletal:  no joint swelling, no deformity, strength grossly normal. Venous stasis changes BL LE  Skin: no rashes, no induration, no tightening, no jaundice   Neuro:  Disorientated, moves all extremities spontaneously.        Data:   CBC:   Recent Labs     01/31/23  0636 02/01/23  0746   WBC 7.6 7.6   HGB 11.2* 12.2*   HCT 33.4* 36.2*    210       BMP:    Recent Labs     01/31/23  0636 02/01/23  0746    139   K 3.8 3.5    101   CO2 23 26   BUN 35* 39*   CREATININE 2.3* 2.2*   GLUCOSE 128* 139*   PHOS 3.3 2.4*         Thanks  Nephrology  [address], [address]  Office: [phone]    Fax: [phone]

## 2023-02-02 NOTE — CARE COORDINATION
CM following: CM met with pt (sleeping) and his son, Caitlin Gonzales, and daughter-in-law at bedside. Caitlin Christian reported he did not receive the SNF list from [facility] via email yesterday and this list was provided in person today. Family is currently thinking pt will discharge to SNF while family looks into a new [place] or [place] community. Henriettasilvestre Gonzales is focusing on SNF's in the [facility] area as it is close to his home and currently Caitlin Gonzales is the only family in the area. Caitlin Hectorelina is to review the SNF list and provide 3 to 4 SNF options to the CM later today or by tomorrow morning. CM will continue to follow for discharge planning.   Electronically signed by MARGI Fairbanks on 2/2/2023 at 1:04 PM  [phone]

## 2023-02-02 NOTE — PROGRESS NOTES
Patient remains oriented to self only. Not following commands. VSS, on room air. Foleys was taken out at 2 pm. Pt. Has not voided yet. Was able to eat apple sauce and pasta. Fall precautions in place, call light within reach. Bed locked in lowest position with alarm on.  Will continue to monitor

## 2023-02-02 NOTE — PLAN OF CARE
Problem: Discharge Planning  Goal: Discharge to home or other facility with appropriate resources  2/2/2023 1808 by Mackenzie Navarro RN  Outcome: Progressing  2/2/2023 0527 by Sandee Whyte RN  Outcome: Progressing     Problem: Pain  Goal: Verbalizes/displays adequate comfort level or baseline comfort level  2/2/2023 1808 by Mackenzie Navarro RN  Outcome: Progressing  2/2/2023 0527 by Sandee Whyte RN  Outcome: Progressing     Problem: Safety - Adult  Goal: Free from fall injury  2/2/2023 1808 by Mackenzie Navarro RN  Outcome: Progressing  2/2/2023 0527 by Sandee Whyte RN  Outcome: Progressing     Problem: Confusion  Goal: Confusion, delirium, dementia, or psychosis is improved or at baseline  Description: INTERVENTIONS:  1. Assess for possible contributors to thought disturbance, including medications, impaired vision or hearing, underlying metabolic abnormalities, dehydration, psychiatric diagnoses, and notify attending LIP  2. [place] high risk fall precautions, as indicated  3. Provide frequent short contacts to provide reality reorientation, refocusing and direction  4. Decrease environmental stimuli, including noise as appropriate  5. Monitor and intervene to maintain adequate nutrition, hydration, elimination, sleep and activity  6. If unable to ensure safety without constant attention obtain sitter and review sitter guidelines with assigned personnel  7.  Initiate Psychosocial CNS and Spiritual Care consult, as indicated  2/2/2023 1808 by Mackenzie Navarro RN  Outcome: Progressing  2/2/2023 0527 by Sandee Whyte RN  Outcome: Not Progressing  Flowsheets (Taken 2/2/2023 9857)  Effect of thought disturbance (confusion, delirium, dementia, or psychosis) are managed with adequate functional status:   Assess for contributors to thought disturbance, including medications, impaired vision or hearing, underlying metabolic abnormalities, dehydration, psychiatric diagnoses, notify [place] high risk fall precautions, as indicated   Provide frequent short contacts to provide reality reorientation, refocusing and direction     Problem: Confusion  Goal: Confusion, delirium, dementia, or psychosis is improved or at baseline  Description: INTERVENTIONS:  1. Assess for possible contributors to thought disturbance, including medications, impaired vision or hearing, underlying metabolic abnormalities, dehydration, psychiatric diagnoses, and notify attending LIP  2. [place] high risk fall precautions, as indicated  3. Provide frequent short contacts to provide reality reorientation, refocusing and direction  4. Decrease environmental stimuli, including noise as appropriate  5. Monitor and intervene to maintain adequate nutrition, hydration, elimination, sleep and activity  6. If unable to ensure safety without constant attention obtain sitter and review sitter guidelines with assigned personnel  7.  Initiate Psychosocial CNS and Spiritual Care consult, as indicated  2/2/2023 8045 by Robert Wen RN  Outcome: Progressing  2/2/2023 6980 by Breanna Rodas, RN  Outcome: Not Progressing  Flowsheets (Taken 2/2/2023 3655)  Effect of thought disturbance (confusion, delirium, dementia, or psychosis) are managed with adequate functional status:   Assess for contributors to thought disturbance, including medications, impaired vision or hearing, underlying metabolic abnormalities, dehydration, psychiatric diagnoses, notify [place] high risk fall precautions, as indicated   Provide frequent short contacts to provide reality reorientation, refocusing and direction

## 2023-02-02 NOTE — PROGRESS NOTES
Pt oriented to self only this shift, speech often difficult to understand however intermittently interactive with appropriate short responses. Unable to follow most commands, VSS on RA. Pt unable to spontaneously void this shift, utilized coude catheter to empty bladder. Pt having difficulty with PO intake this shift, requiring a lot of encouragement to drink fluids and accept medications. Fall and safety precautions maintained.

## 2023-02-02 NOTE — PLAN OF CARE
Problem: Discharge Planning  Goal: Discharge to home or other facility with appropriate resources  Outcome: Progressing     Problem: Pain  Goal: Verbalizes/displays adequate comfort level or baseline comfort level  Outcome: Progressing  Flowsheets (Taken 2/1/2023 0627 by Elizabeth Calderon RN)  Verbalizes/displays adequate comfort level or baseline comfort level: Encourage patient to monitor pain and request assistance     Problem: Safety - Adult  Goal: Free from fall injury  Outcome: Progressing     Problem: Confusion  Goal: Confusion, delirium, dementia, or psychosis is improved or at baseline  Description: INTERVENTIONS:  1. Assess for possible contributors to thought disturbance, including medications, impaired vision or hearing, underlying metabolic abnormalities, dehydration, psychiatric diagnoses, and notify attending LIP  2. [place] high risk fall precautions, as indicated  3. Provide frequent short contacts to provide reality reorientation, refocusing and direction  4. Decrease environmental stimuli, including noise as appropriate  5. Monitor and intervene to maintain adequate nutrition, hydration, elimination, sleep and activity  6. If unable to ensure safety without constant attention obtain sitter and review sitter guidelines with assigned personnel  7.  Initiate Psychosocial CNS and Spiritual Care consult, as indicated  Outcome: Progressing

## 2023-02-03 LAB
ALBUMIN SERPL-MCNC: 3.2 G/DL (ref 3.4–5)
ANION GAP SERPL CALCULATED.3IONS-SCNC: 9 MMOL/L (ref 3–16)
BUN BLDV-MCNC: 43 MG/DL (ref 7–20)
CALCIUM SERPL-MCNC: 9.2 MG/DL (ref 8.3–10.6)
CHLORIDE BLD-SCNC: 99 MMOL/L (ref 99–110)
CO2: 30 MMOL/L (ref 21–32)
CREAT SERPL-MCNC: 1.9 MG/DL (ref 0.8–1.3)
GFR SERPL CREATININE-BSD FRML MDRD: 33 ML/MIN/{1.73_M2}
GLUCOSE BLD-MCNC: 122 MG/DL (ref 70–99)
PHOSPHORUS: 2.4 MG/DL (ref 2.5–4.9)
POTASSIUM SERPL-SCNC: 3.4 MMOL/L (ref 3.5–5.1)
SODIUM BLD-SCNC: 138 MMOL/L (ref 136–145)

## 2023-02-03 PROCEDURE — 97116 GAIT TRAINING THERAPY: CPT

## 2023-02-03 PROCEDURE — 6360000002 HC RX W HCPCS

## 2023-02-03 PROCEDURE — 97530 THERAPEUTIC ACTIVITIES: CPT

## 2023-02-03 PROCEDURE — 6370000000 HC RX 637 (ALT 250 FOR IP): Performed by: INTERNAL MEDICINE

## 2023-02-03 PROCEDURE — 87641 MR-STAPH DNA AMP PROBE: CPT

## 2023-02-03 PROCEDURE — 51702 INSERT TEMP BLADDER CATH: CPT

## 2023-02-03 PROCEDURE — 2580000003 HC RX 258: Performed by: INTERNAL MEDICINE

## 2023-02-03 PROCEDURE — 1200000000 HC SEMI PRIVATE

## 2023-02-03 PROCEDURE — 97535 SELF CARE MNGMENT TRAINING: CPT

## 2023-02-03 PROCEDURE — 80069 RENAL FUNCTION PANEL: CPT

## 2023-02-03 PROCEDURE — 6370000000 HC RX 637 (ALT 250 FOR IP): Performed by: STUDENT IN AN ORGANIZED HEALTH CARE EDUCATION/TRAINING PROGRAM

## 2023-02-03 PROCEDURE — 36415 COLL VENOUS BLD VENIPUNCTURE: CPT

## 2023-02-03 PROCEDURE — 6360000002 HC RX W HCPCS: Performed by: INTERNAL MEDICINE

## 2023-02-03 RX ORDER — POTASSIUM CHLORIDE 20 MEQ/1
40 TABLET, EXTENDED RELEASE ORAL ONCE
Status: COMPLETED | OUTPATIENT
Start: 2023-02-03 | End: 2023-02-03

## 2023-02-03 RX ADMIN — PIPERACILLIN AND TAZOBACTAM 3375 MG: 3; .375 INJECTION, POWDER, LYOPHILIZED, FOR SOLUTION INTRAVENOUS at 15:28

## 2023-02-03 RX ADMIN — LINEZOLID 600 MG: 600 INJECTION, SOLUTION INTRAVENOUS at 03:20

## 2023-02-03 RX ADMIN — SENNOSIDES AND DOCUSATE SODIUM 1 TABLET: 50; 8.6 TABLET ORAL at 21:47

## 2023-02-03 RX ADMIN — ACETAMINOPHEN 650 MG: 325 TABLET, FILM COATED ORAL at 08:23

## 2023-02-03 RX ADMIN — MULTIPLE VITAMINS W/ MINERALS TAB 1 TABLET: TAB at 08:23

## 2023-02-03 RX ADMIN — LINEZOLID 600 MG: 600 INJECTION, SOLUTION INTRAVENOUS at 15:34

## 2023-02-03 RX ADMIN — FUROSEMIDE 20 MG: 10 INJECTION, SOLUTION INTRAMUSCULAR; INTRAVENOUS at 08:21

## 2023-02-03 RX ADMIN — ACETAMINOPHEN 650 MG: 325 TABLET, FILM COATED ORAL at 21:47

## 2023-02-03 RX ADMIN — SODIUM CHLORIDE, PRESERVATIVE FREE 10 ML: 5 INJECTION INTRAVENOUS at 21:49

## 2023-02-03 RX ADMIN — SODIUM CHLORIDE, PRESERVATIVE FREE 10 ML: 5 INJECTION INTRAVENOUS at 08:21

## 2023-02-03 RX ADMIN — SENNOSIDES AND DOCUSATE SODIUM 1 TABLET: 50; 8.6 TABLET ORAL at 08:23

## 2023-02-03 RX ADMIN — PIPERACILLIN AND TAZOBACTAM 3375 MG: 3; .375 INJECTION, POWDER, LYOPHILIZED, FOR SOLUTION INTRAVENOUS at 06:30

## 2023-02-03 RX ADMIN — ACETAMINOPHEN 650 MG: 325 TABLET, FILM COATED ORAL at 15:16

## 2023-02-03 RX ADMIN — PIPERACILLIN AND TAZOBACTAM 3375 MG: 3; .375 INJECTION, POWDER, LYOPHILIZED, FOR SOLUTION INTRAVENOUS at 23:08

## 2023-02-03 RX ADMIN — POTASSIUM CHLORIDE 40 MEQ: 1500 TABLET, EXTENDED RELEASE ORAL at 15:16

## 2023-02-03 ASSESSMENT — PAIN SCALES - PAIN ASSESSMENT IN ADVANCED DEMENTIA (PAINAD)
FACIALEXPRESSION: 2
BREATHING: 0
BODYLANGUAGE: 1
BREATHING: 0
NEGVOCALIZATION: 1
BODYLANGUAGE: 1
CONSOLABILITY: 1
FACIALEXPRESSION: 2
NEGVOCALIZATION: 1
TOTALSCORE: 5
CONSOLABILITY: 1
TOTALSCORE: 5

## 2023-02-03 ASSESSMENT — PAIN DESCRIPTION - ONSET: ONSET: GRADUAL

## 2023-02-03 ASSESSMENT — PAIN DESCRIPTION - FREQUENCY: FREQUENCY: INTERMITTENT

## 2023-02-03 ASSESSMENT — PAIN - FUNCTIONAL ASSESSMENT: PAIN_FUNCTIONAL_ASSESSMENT: ACTIVITIES ARE NOT PREVENTED

## 2023-02-03 ASSESSMENT — PAIN DESCRIPTION - ORIENTATION: ORIENTATION: RIGHT

## 2023-02-03 ASSESSMENT — PAIN DESCRIPTION - DESCRIPTORS: DESCRIPTORS: ACHING

## 2023-02-03 ASSESSMENT — PAIN DESCRIPTION - LOCATION: LOCATION: LEG;BACK

## 2023-02-03 ASSESSMENT — PAIN DESCRIPTION - PAIN TYPE: TYPE: CHRONIC PAIN

## 2023-02-03 ASSESSMENT — PAIN SCALES - GENERAL: PAINLEVEL_OUTOF10: 5

## 2023-02-03 NOTE — PROGRESS NOTES
Occupational Therapy  Facility/Department: [facility]  Occupational Therapy Progress Note    Name: Tomeka Rodriguez  : 5/3/1930  MRN: [id number]  Date of Service: 2/3/2023    Discharge Recommendations:  Tomeka Rodriguez scored a 10/24 on the AM-PAC ADL Inpatient form. Current research shows that an AM-PAC score of 17 or less is typically not associated with a discharge to the patient's home setting. Based on the patient's AM-PAC score and their current ADL deficits, it is recommended that the patient have 3-5 sessions per week of Occupational Therapy at d/c to increase the patient's independence. Please see assessment section for further patient specific details. If patient discharges prior to next session this note will serve as a discharge summary. Please see below for the latest assessment towards goals. OT Equipment Recommendations  Other: defert  to next level of care       Patient Diagnosis(es): The encounter diagnosis was Cellulitis of right lower extremity. 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 Past Surgical History:  has a past surgical history that includes Prostate surgery; joint replacement (Right, ); and Kidney removal (Left). Treatment Diagnosis: decreased ADLs and transfers secondary to cellulitis      Assessment   Performance deficits / Impairments: Decreased functional mobility ; Decreased endurance;Decreased coordination;Decreased ADL status; Decreased balance;Decreased strength  Assessment: Pt is total A with LE ADLs. He was able to perform functional mobility with rolling walker today, requiring mod A of 2 initially, but progressing to max A of 2 with turning and backing up to surface. Recommend ongoing inpatient OT at discharge to increase functional status. Treatment Diagnosis: decreased ADLs and transfers secondary to cellulitis  Prognosis: Fair  REQUIRES OT FOLLOW-UP: Yes        Plan   Occupational Therapy Plan  Times Per Week: 2-5  Times Per Day: Once a day  Current Treatment Recommendations: Strengthening, Balance training, Self-Care / ADL, Coordination training, Functional mobility training, Endurance training, Patient/Caregiver education & training, Safety education & training     Restrictions  Position Activity Restriction  Other position/activity restrictions: up with assist    Subjective   General  Chart Reviewed: Yes  Patient assessed for rehabilitation services?: Yes  Additional Pertinent Hx: Pt admitted to ED with c/o frequent falls and weeping LEs. Found with B LE cellulitis. PMHx includes: anxiety, arthritis, CA, CKD, dementia, MRSA, hearing loss, venous thromboembolism. Family / Caregiver Present: Yes (several family members)  Referring Practitioner: Piter Zuleta MD  Diagnosis: R LE cellulitis  Subjective  Subjective: Pt in chair, agreeable to work with OT.      Social/Functional History  Social/Functional History  Lives With: Alone  Type of Home: Facility [address]  Home Layout: One level  Home Access: Ramped entrance, Elevator  Bathroom Shower/Tub: Walk-in shower  Bathroom Toilet: Handicap height  Bathroom Equipment: Grab bars in shower, Built-in shower seat, Hand-held shower  Home Equipment: Negar Guevara, 4 wheeled  Has the patient had two or more falls in the past year or any fall with injury in the past year?: Yes  ADL Assistance: Independent  Homemaking Responsibilities: No (facility staff)  Ambulation Assistance: Independent (with rollator down to dining room)  Transfer Assistance: Independent  Active : No  Patient's  Info: family  Additional Comments: Daughter in law arrived during session, corroborated above information       Objective                Safety Devices  Type of Devices: Left in bed;Call light within reach; Chair alarm in place;Nurse notified (chair reclined, family present)  Balance  Sitting: Impaired (SBA for safety, supported sitting in chair)  Standing: Impaired (min A of 1-2 with UE support/walker)  Gait  Overall Level of Assistance:  (Functional mobility with rolling walker, initially mod A of 2, knees flexed with narrow base of support, progressing to max A of 2 when turning/backing up to surface)  Toilet Transfers  Toilet - Technique: Ambulating (with rolling walker and mod A of 2, cues to stand upright, max A when turning/sitting)  Equipment Used: Standard bedside commode (over commode)  Toilet Transfer: 2 Person assistance (mod /max A of 2)     ADL  LE Bathing: Dependent/Total (washing buttocks, before donning brief)  LE Dressing: Dependent/Total (donning brief)     Activity Tolerance  Activity Tolerance: Patient tolerated treatment well     Transfers  Sit to stand: 2 Person assistance (max A of 2 from arm chair, cues to lean forward and hand placement)  Stand to sit:  (max A of, cues for safe positioning and hand placement)     Cognition  Overall Cognitive Status: Exceptions  Following Commands: Follows one step commands with increased time; Follows one step commands with repetition; Follows one step commands consistently  Attention Span: Difficulty dividing attention  Safety Judgement: Decreased awareness of need for safety  Initiation: Requires cues for some  Sequencing: Requires cues for some  Cognition Comment: pt has dementia at baseline  Orientation  Orientation Level: Oriented to person;Disoriented to place; Disoriented to time;Disoriented to situation                  Education Given To: Patient; Family  Education Method: Verbal  Barriers to Learning: Cognition  Education Outcome: Verbalized understanding;Continued education needed                        G-Code     OutComes Score                                                  AM-PAC Score AM-PAC Inpatient Daily Activity Raw Score: 10 (02/03/23 1456)  AM-PAC Inpatient ADL T-Scale Score : 27.31 (02/03/23 1456)  ADL Inpatient CMS 0-100% Score: 74.7 (02/03/23 1456)  ADL Inpatient CMS G-Code Modifier : CL (02/03/23 1456)           Goals  Short Term Goals  Time Frame for Short Term Goals: by dc  Short Term Goal 1: Pt will complete LB dressing with min A-2/3 goal not met  Short Term Goal 2: Pt will complete toileting with min A-2/3 goal not addressed  Short Term Goal 3: Pt will complete standing level grooming with min A-2/3 goal not addressed  Patient Goals   Patient goals : for my knee to feel better       Therapy Time   Individual Concurrent Group Co-treatment   Time In 1408         Time Out 1452         Minutes 44             Timed Code Treatment Minutes:   44    Total Treatment Minutes:   [facility] [id number], OTR/L 5148

## 2023-02-03 NOTE — CONSULTS
Patient Name: Roge Murray  : 5/3/1930  Age: 80 y.o. Sex: male    INDICATION FOR CONSULT: urinary retention    History of Present Illness: 81yo M with remote h/o prostatectomy for cancer who is presently admitted for other medical concerns. Urology consulted for urinary retention, which has been a recurrent issue. History obtained from family, as patient non responsive to examiner. Family denies known urinary issues at home, though he does have chronic frequency, urgency and some nocturnal enuresis. No acute retention prior to hospitalization. No recent constipation. ALLERGY:  Allergies   Allergen Reactions    Morphine Other (See Comments)     Confusion, delirium    Sulfamethoxazole-Trimethoprim Rash     He claims he doesn't have allergies       HOME MEDICATIONS:  Medications Prior to Admission: lidocaine 4 % external patch, Place 1 patch onto the skin daily  melatonin 5 MG TBDP disintegrating tablet, Take 1 tablet by mouth nightly as needed (insomnia)  apixaban starter pack (ELIQUIS DVT/PE STARTER PACK) 5 MG TBPK tablet, Take 1 tablet by mouth See Admin Instructions  clobetasol (TEMOVATE) 0.05 % cream, Apply topically 2 times daily Apply topically 2 times daily Monday-Friday to R Lower Leg Wound  diclofenac sodium (VOLTAREN) 1 % GEL, Apply 4 g topically 2 times daily  Multiple Vitamins-Minerals (MULTI VITAMIN/MINERALS) TABS, Take  by mouth.        Past Medical History:   Past Medical History:   Diagnosis Date    Adenocarcinoma of prostate ([address]     Anxiety     Arthritis     Cancer ([facility]     skin    Cataract     CKD (chronic kidney disease) stage 3, GFR 30-59 ml/min (HCC)     Colon polyps     Dementia ([facility]     MRSA infection 2014    leg wound    Retroperitoneal sarcoma (HCC)     Severe hearing loss     Venous thromboembolism (VTE)          Past Surgical History:   Past Surgical History:   Procedure Laterality Date    JOINT REPLACEMENT Right 2005    hip    KIDNEY REMOVAL Left     PROSTATE SURGERY           Family History:  History reviewed. No pertinent family history.     IMMUNIZATIONS:  Immunization History   Administered Date(s) Administered    COVID-19, PFIZER PURPLE top, DILUTE for use, (age 15 y+), 30mcg/0.3mL 2021, 2021, 10/19/2021    Influenza Virus Vaccine 10/28/2004, 2005, 2006, 10/31/2007, 10/27/2008, 2009, 10/08/2010, 2011, 10/13/2013    Influenza Whole 10/13/2013    Influenza, High Dose (Fluzone 65 yrs and older) 2012, 2013, 10/15/2014, 2015, 10/13/2016, 10/21/2017, 10/17/2018, 10/23/2019, 10/05/2020    Pneumococcal Conjugate 13-valent ([id number]) 2015    Pneumococcal Polysaccharide ([id number]) 2005, 2014    Tdap (Boostrix, Adacel) 2012       SOCIAL History:  Social History     Socioeconomic History    Marital status:      Spouse name: Not on file    Number of children: Not on file    Years of education: Not on file    Highest education level: Not on file   Occupational History    Not on file   Tobacco Use    Smoking status: Former     Types: Cigarettes     Quit date: 3/13/1980     Years since quittin.9    Smokeless tobacco: Never    Tobacco comments:     pipe smoker    Substance and Sexual Activity    Alcohol use: Yes     Comment: beer occasional    Drug use: No    Sexual activity: Not on file   Other Topics Concern    Not on file   Social History Narrative    Not on file     Social Determinants of Health     Financial Resource Strain: Not on file   Food Insecurity: Not on file   Transportation Needs: Not on file   Physical Activity: Not on file   Stress: Not on file   Social Connections: Not on file   Intimate Partner Violence: Not on file   Housing Stability: Not on file         PHYSICAL EXAM  Vitals:  BP (!) 148/80   Pulse 86   Temp 98 °F (36.7 °C) (Axillary)   Resp 16   Ht 5' 8\" (1.727 m)   Wt 200 lb (90.7 kg)   SpO2 94%   BMI 30.41 kg/m²   Temp  Av.2 °F (36.8 °C)  Min: 97.9 °F (36.6 °C)  Max: 98.8 °F (37.1 °C)    Intake/Output Summary (Last 24 hours) at 2/2/2023 2020  Last data filed at 2/2/2023 1839  Gross per 24 hour   Intake 390 ml   Output 1250 ml   Net -860 ml       General: no acute distress; resting comfortably in hospital bed  Head: normocephalic and atraumatic  Eyes: sclera non-icteric and conjunctiva non-injected  Neck: supple; full range of motion; trachea midline        DATA  LABS  WBC:    Lab Results   Component Value Date/Time    WBC 7.6 02/01/2023 07:46 AM     Hemoglobin/Hematocrit:    Lab Results   Component Value Date/Time    HGB 12.2 02/01/2023 07:46 AM    HCT 36.2 02/01/2023 07:46 AM     BMP:    Lab Results   Component Value Date/Time     02/02/2023 09:32 AM    K 3.7 02/02/2023 09:32 AM    K 4.0 01/29/2023 08:46 AM     02/02/2023 09:32 AM    CO2 26 02/02/2023 09:32 AM    BUN 44 02/02/2023 09:32 AM    LABALBU 3.0 02/02/2023 09:32 AM    CREATININE 2.2 02/02/2023 09:32 AM    CALCIUM 9.2 02/02/2023 09:32 AM    GFRAA 46 05/18/2022 07:47 AM    LABGLOM 27 02/02/2023 09:32 AM     PT/INR:    Lab Results   Component Value Date/Time    PROTIME 12.6 05/13/2022 08:36 PM    INR 1.11 05/13/2022 08:36 PM     PTT:    Lab Results   Component Value Date/Time    APTT 30.3 05/13/2022 08:36 PM   [APTT    CULTURES:  URINE CULTURE  No results found for this or any previous visit. BLOOD CULTURE  No results found for this or any previous visit. IMPRESSION:  79yo M with h/o prostatectomy now with urinary retention    PLAN:  -I inserted guzman catheter at time of consult since none was in place yet and no void for hours with elevated bladder scan. Prior I/O caths done by nurses. Cath not difficult    -I advised family that patient may have detrusor hypotonicity / atonicity given no stricture upon cath placement and prostate surgically absent.   Can do outpatient UDS if desired if he cannot become catheter free vs pursue long term indwelling cath    -May try another voiding trial prior to DC if desired by family vs DC with cath in place    Tyson Kern MD  2/2/2023

## 2023-02-03 NOTE — CARE COORDINATION
CM following: CM received email from pt's son Gera Conner with the following options for placement at discharge:  [facility], [address]  [facility]  CM sent referral to [facility] and [facility]. CM will provide education that [facility] is ARU and pt not scoring appropriate for referral here and that pt is scoring too complex currently for the Assisted Living option. Electronically signed by MARGI Richards on 2/3/2023 at 9:02 AM  [phone]    UPDATE: CM met with pt's son,Babar, and updated on referrals sent. Gera Conner states he will be available by cell phone for any updated on outcomes of referrals. Electronically signed by MARGI Richards on 2/3/2023 at 11:43 AM  [phone]    UPDATE: BE spoke with Aren Carmona at [facility] who reports they are OON with pt's insurance. Aren Carmona states that [facility] is in network with [facility] [facility] as a follow up option. CM left a VM for [place] [phone] with [facility] to follow up on referral.   CM called [facility] and verified that they do not off skilled nursing services.   Electronically signed by MARGI Richards on 2/3/2023 at 2:58 PM  [phone]

## 2023-02-03 NOTE — PROGRESS NOTES
Patient more alert. Able to get patient to eat more. Family at bedside, patient communicating with them, following commands and conversation. Is able to recognize and address family members by name.

## 2023-02-03 NOTE — PROGRESS NOTES
Hospitalist Progress Note      Name:  Yahaira Miller /Age/Sex: 5/3/1930  (80 y.o. male)   MRN & CSN:  [id number] & [id number] Admission Date/Time: 2023 12:57 PM   Location:  165/6429-21 PCP: [address] [place] [address] Day: 8    Assessment and Plan:     Acute metabolic encephalopathy:  Head CT was non acute  Improving off narcotics but far from baseline. Added to [employer] oral intake, family interaction  Considered brain MRI holding off given improvement  Trial off zyprexa as mentation improving    R leg cellulitis and CHF  Present with more swelling , erythema and pain of r leg  Treated with cefazolin. Improving after IV lasix. Venous doppler showed only chronic DVT, nothing acute  ECHO with diastolic dysfunction  TE hose    Possible pneumonia:  Cough present, rhonchi, CXR with possible infiltrate left lower  Cont  Zosyn and linezolid  Follow-up MRSA swab- reordered, awaiting swab results    Acute lower back pain : MRI of back show moderate stenosis of L3-4   Consulted neurosurgery, advised to get CT T spine. No acute pathology found on spinal imaging. Fall precaution, Tylenol for pain control, lidocaine patch    CKD 3: serum creatinine ~ 1.7 his baseline. Has solitary kidney   Nephrology is following for gentle diuresis    Recurrent DVT/PE:  At home on eliquis, declines home meds here  On lovenox as substitute until able to tolerate pills well    Urinary retention:  Failed voiding trials. H/o remote prostatectomy per family. Seen by urology, advised to keep guzman and voiding trial when stronger. Dispo: Okay to start pre cert once have facility. Okay to DC once placement is arranged. History of Present Illness:   Seen with family at bedside, patient is up in a chair, having conversations and eating good. Had a lot of back pain this morning, responded to repositioning. Objective:      Intake/Output Summary (Last 24 hours) at 2/3/2023 9006  Last data filed at 2/3/2023 0636  Gross per 24 hour   Intake 220 ml   Output 1300 ml   Net -1080 ml        Vitals:   Vitals:    02/03/23 1059   BP: 135/70   Pulse: 79   Resp: 18   Temp: 97.5 °F (36.4 °C)   SpO2: 95%     Physical Exam:   GEN Awake.  Alert , not in respiratory distress, not in pain  HEENT: PEERLA, , supple neck,   Chest: rhonchi present and diminished on the left  Heart: S1 and S2 heard, no murmur, no gallop or rub, regular rate  Abdomen: soft, ND , Nt, +BS  Extremities: resolved erythema and improved edema LE  Neurology: calm, cooperative, able to say his name    Medications:   Medications:    potassium chloride  40 mEq Oral Once    acetaminophen  650 mg Oral 4x Daily    linezolid  600 mg IntraVENous Q12H    piperacillin-tazobactam  3,375 mg IntraVENous Q8H    furosemide  20 mg IntraVENous Daily    enoxaparin  1 mg/kg SubCUTAneous Daily    lidocaine  1 patch TransDERmal Daily    sodium chloride flush  5-40 mL IntraVENous 2 times per day    sennosides-docusate sodium  1 tablet Oral BID    [Held by provider] apixaban  2.5 mg Oral Q12H    therapeutic multivitamin-minerals   Oral Daily      Infusions:    sodium chloride 25 mL/hr at 02/01/23 1631     PRN Meds: perflutren lipid microspheres, 1.5 mL, ONCE PRN  sodium chloride flush, 5-40 mL, PRN  sodium chloride, , PRN  ondansetron, 4 mg, Q8H PRN   Or  ondansetron, 4 mg, Q6H PRN  polyethylene glycol, 17 g, Daily PRN  acetaminophen, 650 mg, Q6H PRN   Or  acetaminophen, 650 mg, Q6H PRN  magnesium sulfate, 2,000 mg, PRN  potassium chloride, 40 mEq, PRN   Or  potassium alternative oral replacement, 40 mEq, PRN   Or  potassium chloride, 10 mEq, PRN  aluminum & magnesium hydroxide-simethicone, 30 mL, Q6H PRN        Electronically signed by Renee Boyce MD on 2/3/2023 at 1:54 PM

## 2023-02-03 NOTE — PROGRESS NOTES
Urology Attending Progress Note      Subjective: Patient alert but confused    Vitals:  BP (!) 153/79   Pulse 77   Temp 98.8 °F (37.1 °C) (Axillary)   Resp 16   Ht 5' 8\" (1.727 m)   Wt 171 lb 15.3 oz (78 kg)   SpO2 94%   BMI 26.15 kg/m²   Temp  Av.1 °F (36.7 °C)  Min: 97.8 °F (36.6 °C)  Max: 98.8 °F (37.1 °C)    Intake/Output Summary (Last 24 hours) at 2/3/2023 0938  Last data filed at 2/3/2023 0636  Gross per 24 hour   Intake 420 ml   Output 1300 ml   Net -880 ml       Exam: Urine clear in guzman    Labs:  WBC:    Lab Results   Component Value Date/Time    WBC 7.6 2023 07:46 AM     Hemoglobin/Hematocrit:    Lab Results   Component Value Date/Time    HGB 12.2 2023 07:46 AM    HCT 36.2 2023 07:46 AM     BMP:    Lab Results   Component Value Date/Time     2023 07:32 AM    K 3.4 2023 07:32 AM    K 4.0 2023 08:46 AM    CL 99 2023 07:32 AM    CO2 30 2023 07:32 AM    BUN 43 2023 07:32 AM    LABALBU 3.2 2023 07:32 AM    CREATININE 1.9 2023 07:32 AM    CALCIUM 9.2 2023 07:32 AM    GFRAA 46 2022 07:47 AM    LABGLOM 33 2023 07:32 AM     PT/INR:    Lab Results   Component Value Date/Time    PROTIME 12.6 2022 08:36 PM    INR 1.11 2022 08:36 PM     PTT:    Lab Results   Component Value Date/Time    APTT 30.3 2022 08:36 PM   [APTT    Impression/Plan: 81yo M with h/o prostatectomy now with urinary retention.    -Urine clear  -Discussed with daughter catheter plans. Catheter to remain in place until patient becomes more mobile, either here vs rehab.  If he is set for discharge soon, would recommend sending him to rehab facility with catheter in place.  -Please call with any questions     ELENA Hopkins

## 2023-02-03 NOTE — PROGRESS NOTES
Comprehensive Nutrition Assessment    RECOMMENDATIONS:  PO Diet: continue regular diet   ONS: start ensure BID  Nutrition Education: Education not indicated     NUTRITION ASSESSMENT:   Nutritional summary & status: LOS. Pt asleep upon attempted RD visit this AM, noted pt is very hard of hearing w/ dementia & poor historian.  lbs w/ hx wt of 189 lbs (5/2022); 9.5% x 9 mos; Not sig wt loss. Pt is on a regular diet w/ PO intake ave 1-25% w/ 0% & 26-50% noted x several meals. Suspect pt not meeting EEN w/ current intakes in place. Rec'd ordering ensure BID w/ meals d/t poor intakes since admit (x 6 days). RD following for ONS acceptance/wts/intakes. Admission/PMH: 80 y.o. male very hard of hearing with history of dementia and therefore poor historian with a significant past medical history of bilateral lower extremity DVTs, chronic venous stasis who presented to [facility] ED with acute low back pain and difficulty ambulating    MALNUTRITION ASSESSMENT  Context of Malnutrition: Acute Illness   Malnutrition Status:  At risk for malnutrition (Comment)  Findings of the 6 clinical characteristics of malnutrition (Minimum of 2 out of 6 clinical characteristics is required to make the diagnosis of moderate or severe Protein Calorie Malnutrition based on AND/ASPEN Guidelines):  Energy Intake:  50% or less of estimated energy requirements for 5 or more days  Weight Loss:  No significant weight loss     Body Fat Loss:  Unable to assess     Muscle Mass Loss:  Unable to assess    Fluid Accumulation:  No significant fluid accumulation     Strength:  Not Performed    NUTRITION DIAGNOSIS   Inadequate oral intake related to acute injury/trauma as evidenced by intake 0-25%    Nutrition Monitoring and Evaluation:   Food/Nutrient Intake Outcomes:  Food and Nutrient Intake, Supplement Intake  Physical Signs/Symptoms Outcomes:  Biochemical Data, Weight     OBJECTIVE DATA: Significant to nutrition assessment  Nutrition Related Findings: +1 pitting BLE edema. K+ 3.4, Cr 1.9, GFR 33, Glucose 122, Phos 2.4  Wounds: None  Nutrition Goals: PO intake 75% or greater, prior to discharge     [address]; Regular  ADULT ORAL NUTRITION SUPPLEMENT; Lunch, Dinner; Standard High Calorie/High Protein Oral Supplement  PO Intake: 1-25%, 26-50%, 0%   PO Supplement Intake:None Ordered  Additional Sources of Calories/IVF:N/A     ANTHROPOMETRICS  Current Height: 5' 8\" (172.7 cm)  Current Weight: 171 lb 15.3 oz (78 kg)    Admission weight: 206 lb 1.6 oz (93.5 kg)  Ideal Body Weight (IBW): 154 lbs  (70 kg)    BMI: 26.2    COMPARATIVE STANDARDS  Energy (kcal):  7342-3153 (20-25 kcal/kg CBW)     Protein (g):  101-117 (1.3-1.5 g/kg CBW)       Fluid (mL/day):  or per MD discretion    The patient will be monitored per nutrition standards of care. Consult dietitian if additional nutrition interventions are needed prior to RD reassessment.      Markus Sahni, [facility]:  [phone]  Office:  [phone]

## 2023-02-03 NOTE — PROGRESS NOTES
Physical Therapy  Facility/Department: [place] [address]  Physical Therapy Daily Treatment    Name: Beto Drew  : 5/3/1930  MRN: [id number]  Date of Service: 2/3/2023    Discharge Recommendations: Beto Drew scored a  on the AM-PAC short mobility form. Current research shows that an AM-PAC score of 17 or less is typically not associated with a discharge to the patient's home setting. Based on the patient's AM-PAC score and their current functional mobility deficits, it is recommended that the patient have 3-5 sessions per week of Physical Therapy at d/c to increase the patient's independence. Please see assessment section for further patient specific details. If patient discharges prior to next session this note will serve as a discharge summary. Please see below for the latest assessment towards goals. PT Equipment Recommendations  Equipment Needed: No  Other: defer      Patient Diagnosis(es): The encounter diagnosis was Cellulitis of right lower extremity. 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 Past Surgical History:  has a past surgical history that includes Prostate surgery; joint replacement (Right, ); and Kidney removal (Left). Assessment   Body Structures, Functions, Activity Limitations Requiring Skilled Therapeutic Intervention: Decreased functional mobility   Assessment: Patient more alert and able to have intelligent speech during the session. Pt seen sitting in the chair, able to stand to a rolling walker Max Ax2 to RW, he was able to maintain the stand for 3 minutes while performing weight shift and marches.  Pt able to take steps and ambulate 10ft to the rest room, which required multiple verbal cues for maintaining the walker. PT is still rec the patient have continued IP therapy. Will continue to follow. Treatment Diagnosis: Impaired functional mobility 2/2 cognition  Therapy Prognosis: Fair  Decision Making: Medium Complexity  Requires PT Follow-Up: Yes  Activity Tolerance  Activity Tolerance: Patient tolerated treatment well     Plan   Physcial Therapy Plan  General Plan:  (2-5)  Current Treatment Recommendations: Strengthening, Balance training, Gait training, Stair training, Functional mobility training, Transfer training, Endurance training  Safety Devices  Type of Devices: Left in bed, Call light within reach, Chair alarm in place, Nurse notified, All fall risk precautions in place     Restrictions  Position Activity Restriction  Other position/activity restrictions: up with assist     Subjective   Subjective  Subjective: Pt found sitting in bed side chair with multiple family members in the room. More alert and able to communicate in full easily understandable sentences.          Social/Functional History  Social/Functional History  Lives With: Alone  Type of Home: Facility ([address] [place] [address]  Home Layout: One level  Home Access: Ramped entrance, Elevator  Bathroom Shower/Tub: Walk-in shower  Bathroom Toilet: Handicap height  Bathroom Equipment: Grab bars in shower, Built-in shower seat, Hand-held shower  Home Equipment: Rafael Santiago, 4 wheeled  Has the patient had two or more falls in the past year or any fall with injury in the past year?: Yes  ADL Assistance: Independent  Homemaking Responsibilities: No (facility staff)  Ambulation Assistance: Independent (with rollator down to dining room)  Transfer Assistance: Independent  Active : No  Patient's  Info: family  Additional Comments: Daughter in law arrived during session, corroborated above information  Vision/Hearing       Cognition         Objective   Heart Rate: 74  Heart Rate Source: Monitor  BP: 118/81  BP Location: Left upper arm  BP Method: Automatic  Patient Position: Semi fowlers  MAP (Calculated): 93  Resp: 18  SpO2: 95 %  O2 Device: None (Room air)                          Balance  Sitting: Impaired (SBA for safety, supported sitting in chair)  Standing: Impaired (min A of 1-2 with UE support/walker)  Gait  Overall Level of Assistance:  (Functional mobility with rolling walker, initially mod A of 2, knees flexed with narrow base of support, progressing to max A of 2 when turning/backing up to surface)     Transfers  Sit to Stand: 2 Person Assistance;Maximum Assistance (From bed side chair to RW x2 trials, and from bed side commode on top of the toilet.)  Stand to Sit: 2 Person Assistance;Maximum Assistance  Ambulation  Surface: Level tile  Device: Rolling Walker  Assistance: Moderate assistance;2 Person assistance;Maximum assistance  Quality of Gait: unsteady at times, narrow braxton, scissoring gait, flexed knees, flexed at the hips  Distance: 10ft+5ft  Comments: On the way back from the restroom, patient signifcantly fatigued, unable to maintain upright posture requiring a quick descend to the chair.  Pt Mod Ax2 with straight aways, Max Ax2 with turns        Exercise Treatment: Standing weight shifts, standing marches        OutComes Score                                                  AM-PAC Score  AM-PAC Inpatient Mobility Raw Score : 11 (02/03/23 1533)  AM-PAC Inpatient T-Scale Score : 33.86 (02/03/23 1533)  Mobility Inpatient CMS 0-100% Score: 72.57 (02/03/23 1533)  Mobility Inpatient CMS G-Code Modifier : CL (02/03/23 1533)          Tinneti Score       Goals  Short Term Goals  Time Frame for Short Term Goals: discharge- ongoing  Short Term Goal 1: Patient will perform all bed mobility Ryan  Short Term Goal 2: Patient will be able to demo a full sit<>stand to RW Min A  Short Term Goal 3: Patient will be able to perform a stand pivot transfer Min A with RW  Patient Goals   Patient Goals : unable to state       Education  Patient Education  Education Given To: Patient  Education Provided: Role of Therapy;Transfer Training  Education Method: Demonstration  Barriers to Learning: Cognition      Therapy Time   Individual Concurrent Group Co-treatment   Time In 7610         Time Out 1445         Minutes 40          Timed Code Treatment Minutes:   40    Total Treatment Minutes:  40         Harrietta Cranker, [place]

## 2023-02-03 NOTE — PROGRESS NOTES
No acute events overnight. Repositioned frequently to prevent skin breakdown. Remains confused although pleasant at this time. Shift assessments and VS WDL. Call light is in reach. Bed alarm is engaged.

## 2023-02-03 NOTE — PLAN OF CARE
Problem: Discharge Planning  Goal: Discharge to home or other facility with appropriate resources  2/3/2023 1018 by Michael Murphy RN  Outcome: Progressing     Problem: Pain  Goal: Verbalizes/displays adequate comfort level or baseline comfort level  Outcome: Progressing     Problem: Safety - Adult  Goal: Free from fall injury  Outcome: Progressing     Problem: Confusion  Goal: Confusion, delirium, dementia, or psychosis is improved or at baseline  Description: INTERVENTIONS:  1. Assess for possible contributors to thought disturbance, including medications, impaired vision or hearing, underlying metabolic abnormalities, dehydration, psychiatric diagnoses, and notify attending LIP  2. [place] high risk fall precautions, as indicated  3. Provide frequent short contacts to provide reality reorientation, refocusing and direction  4. Decrease environmental stimuli, including noise as appropriate  5. Monitor and intervene to maintain adequate nutrition, hydration, elimination, sleep and activity  6. If unable to ensure safety without constant attention obtain sitter and review sitter guidelines with assigned personnel  7. Initiate Psychosocial CNS and Spiritual Care consult, as indicated  Outcome: Progressing     Problem: Skin/Tissue Integrity  Goal: Absence of new skin breakdown  Description: 1. Monitor for areas of redness and/or skin breakdown  2. Assess vascular access sites hourly  3. Every 4-6 hours minimum:  Change oxygen saturation probe site  4. Every 4-6 hours:  If on nasal continuous positive airway pressure, respiratory therapy assess nares and determine need for appliance change or resting period.   2/3/2023 1018 by Michael Murphy RN  Outcome: Progressing     Problem: ABCDS Injury Assessment  Goal: Absence of physical injury  Outcome: Progressing

## 2023-02-03 NOTE — PROGRESS NOTES
Patient states discomfort when we adjust him in bed. Once settled in place, he is calm and no longer grimacing. Will continue to monitor.

## 2023-02-03 NOTE — PROGRESS NOTES
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 ([address] 2021    Chest pain 2015    Cancer ([address]     DVT (deep venous thrombosis) ([address] 2014    Pulmonary embolism ([address] 2014       Past Medical History:   Diagnosis Date    Adenocarcinoma of prostate ([address]     Anxiety     Arthritis     Cancer ([address]     skin    Cataract     CKD (chronic kidney disease) stage 3, GFR 30-59 ml/min (HCC)     Colon polyps     Dementia ([address]     MRSA infection 2014    leg wound    Retroperitoneal sarcoma (HCC)     Severe hearing loss     Venous thromboembolism (VTE)      Past Surgical History:   Procedure Laterality Date    JOINT REPLACEMENT Right     hip KIDNEY REMOVAL Left     PROSTATE SURGERY         Reason for Referral:  [facility]  was referred for a Speech Therapy evaluation to assess swallow function and/or communication. History of Present Illness  As per DO, \"80 y.o. male very hard of hearing with history of dementia and therefore poor historian with a significant past medical history of bilateral lower extremity DVTs, chronic venous stasis who presented to [facility] ED with acute low back pain and difficulty ambulating. At baseline he walks with a walker. 2 days ago he had a fall and since he has been complaining of low back pain and right lower extremity pain and has had difficulty getting around. It is unclear whether his fall was witnessed or not. His baseline cognitive status is also unknown at this time. Patient cannot contribute to history much and there is no one available from the facility to supply any more information. \"    CXR (01/31/2023): Impression       Patchy consolidation in the left lung base. Correlate for pneumonia. Right lung is clear. Normal cardiac mediastinal silhouette. Scoliosis. CT head(01/27/2023): Impression   1. Mild atrophy. 2. Patchy areas of decreased white matter attenuation. The findings are nonspecific, but most likely represent chronic small vessel ischemic change. 3.  No evidence of an acute intracranial process. Current Diet: Regular/Thin liquids, Meds whole with water     Medical Diagnosis: Cellulitis of right lower extremity   Treatment Diagnosis: Good    Pain:  None indicated at time of visit     General:  Chart reviewed: Yes  Behavior/Cognition: Alert/Oriented   Communication Observation: Pt engaged in conversation with family members and SLP  Follows Directions:  Followed simple directions   Dentition: Adequate    Respiratory Status/oxygen requirements: room air  Vision/Hearing: Vision WFL, Very Hard of Hearing   Patient Complaint: None stated at time of visit  Patient Positioning: Upright in chair     Prior Dysphagia History:   Pt seen by SLP in 2022 with no s/s of aspiration. Bedside Swallowing Evaluation Impression (2023):   Pt alert and upright in chair upon arrival. Family members present in the room and stated pt is hard of hearing. Pt oriented x4 (name, , location, date). OME WFL with no facial asymmetry noted. Pt presented with lunch tray consisting of sandwich, fruit, and thin liquids. Pt exhibited no s/s of aspiration with any PO trials. Pt demonstrated appropriate labial seal with straw and fork. No anterior spillage noted. Pt and family were educated on purpose of the visit, role of SLP and s/s of aspiration. Family stated comprehension. Therapy Recommendations:  Requires SLP Intervention: No  Instrumental assessment: N/A    Education  Pt and family were educated on the role of the SLP, purpose of the visit, and s/s of aspiration. Pt comprehension questionable due to hard of hearing, however, family stated understanding. Prognosis:  Prognosis: good    Barriers to reach goals: N/A  Consulted and agree with results and recommendations: Jessenia    Treatment:  N/A    Plan:  Recommended diet: Regular/Thin liquids  Recommended form of medication: Whole with water   Swallowing Strategies: 90 degrees all meals  Pt goal: Discharge   Pt discharge goal: Discharge   Total treatment time: 25  Discharge Recommendation: no follow up indicated  Discussed with COREY Rodrigues.    Clinician   Speech Therapist was present, directed the patient's care, made skilled judgement, and was responsible for assessment and treatment of the patient. Demetrio Corbett M.S./[facility]-SLP #[id number]  Pg.  # [phone]

## 2023-02-03 NOTE — PLAN OF CARE
Problem: Discharge Planning  Goal: Discharge to home or other facility with appropriate resources  2/2/2023 2134 by Moon Cabezas RN  Outcome: Progressing  Plan of care reviewed with pt and family. All questions answered at this time. Problem: Skin/Tissue Integrity  Goal: Absence of new skin breakdown  Outcome: Progressing  Turn and reposition q 2 hrs.

## 2023-02-03 NOTE — PROGRESS NOTES
Able to get patient to eat some of breakfast, but he is refusing more at this time. Will wait a few minutes and attempt to try for more bites. Patient alert and talking, but not making logical sense. Conversation topic keeps changing. Calm, cooperative, complains of pain.

## 2023-02-03 NOTE — PROGRESS NOTES
[address] [place] [address] Nephrology   [facility]  [phone]  Nephrology Consult Note          Patient ID: Roge Murray  Referring/ Physician: Xander Gibson MD      HPI/Summary:     Roge Murray is being seen by nephrology for CKD and DIruesis. Patient is a 80 y.o. M who had a left nephrectomy in 2018. Before then, baseline creatinine was ~1.3. Creatinine is 1.8 > 2.2 > 1.9 post diuresis   BP is better   Urine is 1400 ml. Etienne catheter is in place         Plan:     Continue 20 Lasix IV Daily. Edema is improving   Urine Protein/creatinine is 400 mg / day   SPEP  UPEP  Replace KCL IV. He has poor intake   Poor overall prognosis. 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 Please call with any questions or concerns. Yuniel Oswald MD  [facility]  [address]  Fax: [phone]  Office: [phone]         CC/Reason for consult:   Reason for consult: CKD, Diuresis   Chief Complaint   Patient presents with    Back Pain    Leg Swelling     Pt reports to ED from [facility]. C/o back pain x 2 days. Per ems staff also report LLE has increased swelling with weeping edema            Review of Systems:   [address] All other remaining systems are negative. Constitutional:  fever, chills, weakness, weight change, fatigue,      Skin:  rash, pruritus, hair loss, bruising, dry skin, petechiae. Head, Face, Neck   headaches, swelling,  cervical adenopathy.      Respiratory: shortness of breath, cough, or wheezing  Cardiovascular: chest pain, palpitations, dizzy, edema  Gastrointestinal: nausea, vomiting, diarrhea, constipation,belly pain    Yellow skin, blood in stool  Musculoskeletal: back pain, muscle weakness, gait problems,       joint pain or swelling. Genitourinary:  dysuria, poor urine flow, flank pain, blood in urine  Neurologic:  vertigo, TIA'S, syncope, seizures, focal weakness  Psychosocial:  insomnia, anxiety, or depression. Additional positive findings: -     PMH/SH/FH:    Medical Hx: reviewed and updated as appropriate  Past Medical History:   Diagnosis Date    Adenocarcinoma of prostate ([facility]     Anxiety     Arthritis     Cancer ([facility]     skin    Cataract     CKD (chronic kidney disease) stage 3, GFR 30-59 ml/min (HCC)     Colon polyps     Dementia ([facility]     MRSA infection 2014    leg wound    Retroperitoneal sarcoma (HCC)     Severe hearing loss     Venous thromboembolism (VTE)          Surgical Hx: reviewed and updated as appropriate   has a past surgical history that includes Prostate surgery; joint replacement (Right, ); and Kidney removal (Left). Social Hx: reviewed and updated as appropriate  Social History     Tobacco Use    Smoking status: Former     Types: Cigarettes     Quit date: 3/13/1980     Years since quittin.9    Smokeless tobacco: Never    Tobacco comments:     pipe smoker    Substance Use Topics    Alcohol use: Yes     Comment: beer occasional        Family hx: reviewed and updated as appropriate  family history is not on file. Medications:   acetaminophen, 650 mg, 4x Daily  OLANZapine zydis, 2.5 mg, Nightly  linezolid, 600 mg, Q12H  piperacillin-tazobactam, 3,375 mg, Q8H  furosemide, 20 mg, Daily  enoxaparin, 1 mg/kg, Daily  lidocaine, 1 patch, Daily  sodium chloride flush, 5-40 mL, 2 times per day  sennosides-docusate sodium, 1 tablet, BID  [Held by provider] apixaban, 2.5 mg, Q12H  therapeutic multivitamin-minerals, , Daily     Morphine and Sulfamethoxazole-trimethoprim    Allergies:    Allergies   Allergen Reactions    Morphine Other (See Comments)     Confusion, delirium    Sulfamethoxazole-Trimethoprim Rash     He claims he doesn't have allergies         Physical Exam/Objective:   Vitals:    02/03/23 0632   BP: (!) 153/79   Pulse: 77   Resp: 16   Temp: 98.8 °F (37.1 °C)   SpO2: 94%       Intake/Output Summary (Last 24 hours) at 2/3/2023 1007  Last data filed at 2/3/2023 0636  Gross per 24 hour   Intake 420 ml   Output 1300 ml   Net -880 ml           General appearance: in no acute distress, comfortable, disorientated  HEENT: no icterus, EOM intact, trachea midline. Neck : no masses, appears symmetrical and no JVD appreciated. Respiratory: Respiratory effort normal, bilateral equal chest rise. No wheeze, no crackles   Cardiovascular: Ausculation shows RRR and  Some edema   Abdomen: abdomen is soft, non distended, no masses, no pain with palpation. Musculoskeletal:  no joint swelling, no deformity, strength grossly normal. Venous stasis changes BL LE  Skin: no rashes, no induration, no tightening, no jaundice   Neuro:  Disorientated, moves all extremities spontaneously.        Data:   CBC:   Recent Labs     02/01/23  0746   WBC 7.6   HGB 12.2*   HCT 36.2*          BMP:    Recent Labs     02/01/23  0746 02/02/23  0932 02/03/23  0732    140 138   K 3.5 3.7 3.4*    103 99   CO2 26 26 30   BUN 39* 44* 43*   CREATININE 2.2* 2.2* 1.9*   GLUCOSE 139* 113* 122*   PHOS 2.4* 2.6 2.4*         Thanks  Nephrology  [address], [address]  Office: [phone]    Fax: [phone]

## 2023-02-04 LAB
ALBUMIN SERPL-MCNC: 2.7 G/DL (ref 3.4–5)
ANION GAP SERPL CALCULATED.3IONS-SCNC: 13 MMOL/L (ref 3–16)
BUN BLDV-MCNC: 38 MG/DL (ref 7–20)
CALCIUM SERPL-MCNC: 8.8 MG/DL (ref 8.3–10.6)
CHLORIDE BLD-SCNC: 102 MMOL/L (ref 99–110)
CO2: 20 MMOL/L (ref 21–32)
CREAT SERPL-MCNC: 1.7 MG/DL (ref 0.8–1.3)
GFR SERPL CREATININE-BSD FRML MDRD: 37 ML/MIN/{1.73_M2}
GLUCOSE BLD-MCNC: 129 MG/DL (ref 70–99)
PHOSPHORUS: 2.1 MG/DL (ref 2.5–4.9)
POTASSIUM SERPL-SCNC: 4.2 MMOL/L (ref 3.5–5.1)
SODIUM BLD-SCNC: 135 MMOL/L (ref 136–145)

## 2023-02-04 PROCEDURE — 51702 INSERT TEMP BLADDER CATH: CPT

## 2023-02-04 PROCEDURE — 6370000000 HC RX 637 (ALT 250 FOR IP): Performed by: STUDENT IN AN ORGANIZED HEALTH CARE EDUCATION/TRAINING PROGRAM

## 2023-02-04 PROCEDURE — 80069 RENAL FUNCTION PANEL: CPT

## 2023-02-04 PROCEDURE — 36415 COLL VENOUS BLD VENIPUNCTURE: CPT

## 2023-02-04 PROCEDURE — 1200000000 HC SEMI PRIVATE

## 2023-02-04 PROCEDURE — 6360000002 HC RX W HCPCS

## 2023-02-04 PROCEDURE — 6370000000 HC RX 637 (ALT 250 FOR IP): Performed by: INTERNAL MEDICINE

## 2023-02-04 PROCEDURE — 6360000002 HC RX W HCPCS: Performed by: INTERNAL MEDICINE

## 2023-02-04 PROCEDURE — 2580000003 HC RX 258: Performed by: INTERNAL MEDICINE

## 2023-02-04 RX ADMIN — ACETAMINOPHEN 650 MG: 325 TABLET, FILM COATED ORAL at 20:11

## 2023-02-04 RX ADMIN — PIPERACILLIN AND TAZOBACTAM 3375 MG: 3; .375 INJECTION, POWDER, LYOPHILIZED, FOR SOLUTION INTRAVENOUS at 06:18

## 2023-02-04 RX ADMIN — PIPERACILLIN AND TAZOBACTAM 3375 MG: 3; .375 INJECTION, POWDER, LYOPHILIZED, FOR SOLUTION INTRAVENOUS at 21:51

## 2023-02-04 RX ADMIN — FUROSEMIDE 20 MG: 10 INJECTION, SOLUTION INTRAMUSCULAR; INTRAVENOUS at 09:31

## 2023-02-04 RX ADMIN — SODIUM CHLORIDE, PRESERVATIVE FREE 10 ML: 5 INJECTION INTRAVENOUS at 20:12

## 2023-02-04 RX ADMIN — MULTIPLE VITAMINS W/ MINERALS TAB 1 TABLET: TAB at 09:34

## 2023-02-04 RX ADMIN — ACETAMINOPHEN 650 MG: 325 TABLET, FILM COATED ORAL at 09:32

## 2023-02-04 RX ADMIN — SENNOSIDES AND DOCUSATE SODIUM 1 TABLET: 50; 8.6 TABLET ORAL at 09:33

## 2023-02-04 RX ADMIN — SENNOSIDES AND DOCUSATE SODIUM 1 TABLET: 50; 8.6 TABLET ORAL at 20:11

## 2023-02-04 RX ADMIN — LINEZOLID 600 MG: 600 INJECTION, SOLUTION INTRAVENOUS at 04:34

## 2023-02-04 RX ADMIN — ENOXAPARIN SODIUM 90 MG: 100 INJECTION SUBCUTANEOUS at 09:50

## 2023-02-04 RX ADMIN — PIPERACILLIN AND TAZOBACTAM 3375 MG: 3; .375 INJECTION, POWDER, LYOPHILIZED, FOR SOLUTION INTRAVENOUS at 15:04

## 2023-02-04 RX ADMIN — ACETAMINOPHEN 650 MG: 325 TABLET, FILM COATED ORAL at 15:10

## 2023-02-04 RX ADMIN — LINEZOLID 600 MG: 600 INJECTION, SOLUTION INTRAVENOUS at 20:10

## 2023-02-04 RX ADMIN — SODIUM CHLORIDE, PRESERVATIVE FREE 10 ML: 5 INJECTION INTRAVENOUS at 09:48

## 2023-02-04 ASSESSMENT — PAIN DESCRIPTION - LOCATION
LOCATION: BACK

## 2023-02-04 ASSESSMENT — PAIN SCALES - PAIN ASSESSMENT IN ADVANCED DEMENTIA (PAINAD)
TOTALSCORE: 5
TOTALSCORE: 5
CONSOLABILITY: 1
BODYLANGUAGE: 1
NEGVOCALIZATION: 1
BREATHING: 0
CONSOLABILITY: 1
FACIALEXPRESSION: 2
TOTALSCORE: 3
CONSOLABILITY: 0
NEGVOCALIZATION: 0
NEGVOCALIZATION: 1
BREATHING: 0
BODYLANGUAGE: 1
BODYLANGUAGE: 1
FACIALEXPRESSION: 2
FACIALEXPRESSION: 2
BREATHING: 0

## 2023-02-04 ASSESSMENT — PAIN SCALES - GENERAL
PAINLEVEL_OUTOF10: 0
PAINLEVEL_OUTOF10: 0
PAINLEVEL_OUTOF10: 2
PAINLEVEL_OUTOF10: 2

## 2023-02-04 ASSESSMENT — PAIN SCALES - WONG BAKER
WONGBAKER_NUMERICALRESPONSE: 2

## 2023-02-04 ASSESSMENT — PAIN DESCRIPTION - DESCRIPTORS
DESCRIPTORS: ACHING
DESCRIPTORS: ACHING

## 2023-02-04 ASSESSMENT — PAIN DESCRIPTION - ORIENTATION
ORIENTATION: LOWER

## 2023-02-04 ASSESSMENT — PAIN - FUNCTIONAL ASSESSMENT
PAIN_FUNCTIONAL_ASSESSMENT: PREVENTS OR INTERFERES SOME ACTIVE ACTIVITIES AND ADLS
PAIN_FUNCTIONAL_ASSESSMENT: PREVENTS OR INTERFERES SOME ACTIVE ACTIVITIES AND ADLS

## 2023-02-04 NOTE — PROGRESS NOTES
[address] [place] [address] Nephrology   [facility]  [phone]  Nephrology Consult Note          Patient ID: Bernadette Shearer  Referring/ Physician: Leeann Quinteros MD      HPI/Summary:     Bernadette Shearer is being seen by nephrology for CKD and DIruesis. Patient is a 80 y.o. M who had a left nephrectomy in 2018. Before then, baseline creatinine was ~1.3. Creatinine is 1.8 > 2.2 > 1.9 post diuresis   BP is better   Urine is 1400 ml. Etienne catheter is in place         Plan:   The patient's most recent serum creatinine was 1.7, it was 1.91-day earlier  The patient urine output for yesterday was 1650 mL  According to the intake and output, he is in the negative fluid balance of about 10 L. The patient most recent set of vital signs showed blood pressure 144/56  The patient no pitting edema  The patient is reported p.o. intake is poor. Reviewed his medication list no IV fluid on board  The patient getting Lasix 20 mg, as of now, he no longer requires Lasix, so I will discontinue it    The patient was seen and examined in his room  He was resting comfortably in his bed  He was talking to himself  The patient appears to be comfortable  He was laughing as well. 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 Please call with any questions or concerns.     Miguel Starks MD  [facility]  [address] [place] [address], [address] [place] [address] Ave  Fax: [phone]  Office: [phone]         CC/Reason for consult:   Reason for consult: CKD, Diuresis Chief Complaint   Patient presents with    Back Pain    Leg Swelling     Pt reports to ED from [facility]. C/o back pain x 2 days. Per ems staff also report LLE has increased swelling with weeping edema            Review of Systems:   [address]. All other remaining systems are negative. Constitutional:  fever, chills, weakness, weight change, fatigue,      Skin:  rash, pruritus, hair loss, bruising, dry skin, petechiae. Head, Face, Neck   headaches, swelling,  cervical adenopathy. Respiratory: shortness of breath, cough, or wheezing  Cardiovascular: chest pain, palpitations, dizzy, edema  Gastrointestinal: nausea, vomiting, diarrhea, constipation,belly pain    Yellow skin, blood in stool  Musculoskeletal:  back pain, muscle weakness, gait problems,       joint pain or swelling. Genitourinary:  dysuria, poor urine flow, flank pain, blood in urine  Neurologic:  vertigo, TIA'S, syncope, seizures, focal weakness  Psychosocial:  insomnia, anxiety, or depression. Additional positive findings: -     PMH/SH/FH:    Medical Hx: reviewed and updated as appropriate  Past Medical History:   Diagnosis Date    Adenocarcinoma of prostate ([facility]     Anxiety     Arthritis     Cancer ([facility]     skin    Cataract     CKD (chronic kidney disease) stage 3, GFR 30-59 ml/min (HCC)     Colon polyps     Dementia ([facility]     MRSA infection 2014    leg wound    Retroperitoneal sarcoma (HCC)     Severe hearing loss     Venous thromboembolism (VTE)          Surgical Hx: reviewed and updated as appropriate   has a past surgical history that includes Prostate surgery; joint replacement (Right, ); and Kidney removal (Left). Social Hx: reviewed and updated as appropriate  Social History     Tobacco Use    Smoking status: Former     Types: Cigarettes     Quit date: 3/13/1980     Years since quittin.9    Smokeless tobacco: Never    Tobacco comments:     pipe smoker    Substance Use Topics    Alcohol use:  Yes Comment: beer occasional        Family hx: reviewed and updated as appropriate  family history is not on file. Medications:   acetaminophen, 650 mg, 4x Daily  linezolid, 600 mg, Q12H  piperacillin-tazobactam, 3,375 mg, Q8H  furosemide, 20 mg, Daily  enoxaparin, 1 mg/kg, Daily  lidocaine, 1 patch, Daily  sodium chloride flush, 5-40 mL, 2 times per day  sennosides-docusate sodium, 1 tablet, BID  [Held by provider] apixaban, 2.5 mg, Q12H  therapeutic multivitamin-minerals, , Daily     Morphine and Sulfamethoxazole-trimethoprim    Allergies: Allergies   Allergen Reactions    Morphine Other (See Comments)     Confusion, delirium    Sulfamethoxazole-Trimethoprim Rash     He claims he doesn't have allergies         Physical Exam/Objective:   Vitals:    02/04/23 1113   BP: (!) 144/56   Pulse: 90   Resp: 16   Temp: 97.8 °F (36.6 °C)   SpO2: 94%       Intake/Output Summary (Last 24 hours) at 2/4/2023 1228  Last data filed at 2/4/2023 9923  Gross per 24 hour   Intake 60 ml   Output 1550 ml   Net -1490 ml           General appearance: in no acute distress, comfortable, disorientated  HEENT: no icterus, EOM intact, trachea midline. Neck : no masses, appears symmetrical and no JVD appreciated. Respiratory: Respiratory effort normal, bilateral equal chest rise. No wheeze, no crackles   Cardiovascular: Ausculation shows RRR and  Some edema   Abdomen: abdomen is soft, non distended, no masses, no pain with palpation. Musculoskeletal:  no joint swelling, no deformity, strength grossly normal. Venous stasis changes BL LE  Skin: no rashes, no induration, no tightening, no jaundice   Neuro:  Disorientated, moves all extremities spontaneously. Data:   CBC:   No results for input(s): WBC, HGB, HCT, PLT in the last 72 hours.     BMP:    Recent Labs     02/02/23  0932 02/03/23  0732 02/04/23  0859    138 135*   K 3.7 3.4* 4.2    99 102   CO2 26 30 20*   BUN 44* 43* 38*   CREATININE 2.2* 1.9* 1.7*   GLUCOSE 113* 122* 129*   PHOS 2.6 2.4* 2.1*         Thanks  Nephrology  [address]  Office: [phone]    Fax: [phone]

## 2023-02-04 NOTE — PROGRESS NOTES
Pt is Alert but oriented self only. No acute changes over this night. Stable vitals on RA. Pt voiding via guzman, Guzman care completed with soap and water. Iv pulled out by  Patient, Placed new IV, All fall precaution are in placed. Bed alarm is on and pt is on camera for extra safety. Will continue monitor. .. Dennis Odell

## 2023-02-04 NOTE — PROGRESS NOTES
Hospitalist Progress Note      Name:  Juan Manuel Cope /Age/Sex: 5/3/1930  (80 y.o. male)   MRN & CSN:  [id number] & [id number] Admission Date/Time: 2023 12:57 PM   Location:  [id number] PCP: [address] [place] [address] Day: 9    Assessment and Plan:     Acute metabolic encephalopathy:  Head CT was non acute  Improving off narcotics. Added to [employer].  Off restraints and also off sedatives      R leg cellulitis and CHF  Present with more swelling , erythema and pain of r leg  Treated with cefazolin. Improving after IV lasix. Venous doppler showed only chronic DVT, nothing acute  ECHO with diastolic dysfunction  BALA hose    Possible pneumonia:  Cough present, rhonchi, CXR with possible infiltrate left lower  Cont  Zosyn and linezolid  Follow-up MRSA swab- reordered, awaiting swab results    Acute lower back pain : MRI of back show moderate stenosis of L3-4   Consulted neurosurgery, advised to get CT T spine. No acute pathology found on spinal imaging. Fall precaution, Tylenol for pain control, lidocaine patch    CKD 3: serum creatinine ~ 1.7 his baseline. Has solitary kidney   Nephrology is following for gentle diuresis  Holding off on diuresis for now as per nephrology    Recurrent DVT/PE:  At home on eliquis, declines home meds here  On lovenox as substitute until able to tolerate pills well    Urinary retention:  Failed voiding trials. H/o remote prostatectomy per family. Seen by urology, advised to keep guzman and voiding trial when stronger. Dispo: Okay to start pre cert once have facility. Okay to DC once placement is arranged. History of Present Illness:     Patient reports he is not having as a good day as yesterday. There is no visitors. He is reporting some back pain. Denies any shortness of breath or chest pain. Guzman is draining. Objective:      Intake/Output Summary (Last 24 hours) at 2023 1655  Last data filed at 2023 1459  Gross per 24 hour   Intake 60 ml   Output 1900 ml   Net -1840 ml        Vitals:   Vitals:    02/04/23 1113   BP: (!) 144/56   Pulse: 90   Resp: 16   Temp: 97.8 °F (36.6 °C)   SpO2: 94%     Physical Exam:   GEN Awake.  Alert , not in respiratory distress, not in pain  HEENT: PEERLA, , supple neck,   Chest: rhonchi present and diminished on the left  Heart: S1 and S2 heard, no murmur, no gallop or rub, regular rate  Abdomen: soft, ND , Nt, +BS  Extremities: resolved erythema and improved edema LE  Neurology: calm, cooperative, able to say his name, follows simple commands    Medications:   Medications:    acetaminophen  650 mg Oral 4x Daily    linezolid  600 mg IntraVENous Q12H    piperacillin-tazobactam  3,375 mg IntraVENous Q8H    enoxaparin  1 mg/kg SubCUTAneous Daily    lidocaine  1 patch TransDERmal Daily    sodium chloride flush  5-40 mL IntraVENous 2 times per day    sennosides-docusate sodium  1 tablet Oral BID    [Held by provider] apixaban  2.5 mg Oral Q12H    therapeutic multivitamin-minerals   Oral Daily      Infusions:    sodium chloride 25 mL/hr at 02/01/23 1631     PRN Meds: perflutren lipid microspheres, 1.5 mL, ONCE PRN  sodium chloride flush, 5-40 mL, PRN  sodium chloride, , PRN  ondansetron, 4 mg, Q8H PRN   Or  ondansetron, 4 mg, Q6H PRN  polyethylene glycol, 17 g, Daily PRN  acetaminophen, 650 mg, Q6H PRN   Or  acetaminophen, 650 mg, Q6H PRN  magnesium sulfate, 2,000 mg, PRN  potassium chloride, 40 mEq, PRN   Or  potassium alternative oral replacement, 40 mEq, PRN   Or  potassium chloride, 10 mEq, PRN  aluminum & magnesium hydroxide-simethicone, 30 mL, Q6H PRN        Electronically signed by Duyen Red MD on 2/4/2023 at 4:55 PM

## 2023-02-05 LAB — MRSA SCREEN RT-PCR: NORMAL

## 2023-02-05 PROCEDURE — 2580000003 HC RX 258: Performed by: INTERNAL MEDICINE

## 2023-02-05 PROCEDURE — 6370000000 HC RX 637 (ALT 250 FOR IP): Performed by: INTERNAL MEDICINE

## 2023-02-05 PROCEDURE — 1200000000 HC SEMI PRIVATE

## 2023-02-05 PROCEDURE — 6370000000 HC RX 637 (ALT 250 FOR IP): Performed by: STUDENT IN AN ORGANIZED HEALTH CARE EDUCATION/TRAINING PROGRAM

## 2023-02-05 PROCEDURE — 6360000002 HC RX W HCPCS: Performed by: INTERNAL MEDICINE

## 2023-02-05 PROCEDURE — 51702 INSERT TEMP BLADDER CATH: CPT

## 2023-02-05 RX ORDER — AMLODIPINE BESYLATE 5 MG/1
5 TABLET ORAL DAILY
Status: DISCONTINUED | OUTPATIENT
Start: 2023-02-05 | End: 2023-02-07 | Stop reason: HOSPADM

## 2023-02-05 RX ADMIN — SENNOSIDES AND DOCUSATE SODIUM 1 TABLET: 50; 8.6 TABLET ORAL at 11:34

## 2023-02-05 RX ADMIN — PIPERACILLIN AND TAZOBACTAM 3375 MG: 3; .375 INJECTION, POWDER, LYOPHILIZED, FOR SOLUTION INTRAVENOUS at 06:44

## 2023-02-05 RX ADMIN — PIPERACILLIN AND TAZOBACTAM 3375 MG: 3; .375 INJECTION, POWDER, LYOPHILIZED, FOR SOLUTION INTRAVENOUS at 22:53

## 2023-02-05 RX ADMIN — ACETAMINOPHEN 650 MG: 325 TABLET, FILM COATED ORAL at 11:34

## 2023-02-05 RX ADMIN — SODIUM CHLORIDE, PRESERVATIVE FREE 10 ML: 5 INJECTION INTRAVENOUS at 22:54

## 2023-02-05 RX ADMIN — SODIUM CHLORIDE: 9 INJECTION, SOLUTION INTRAVENOUS at 22:52

## 2023-02-05 RX ADMIN — PIPERACILLIN AND TAZOBACTAM 3375 MG: 3; .375 INJECTION, POWDER, LYOPHILIZED, FOR SOLUTION INTRAVENOUS at 14:08

## 2023-02-05 RX ADMIN — MULTIPLE VITAMINS W/ MINERALS TAB 1 TABLET: TAB at 11:34

## 2023-02-05 ASSESSMENT — PAIN SCALES - PAIN ASSESSMENT IN ADVANCED DEMENTIA (PAINAD)
NEGVOCALIZATION: 0
CONSOLABILITY: 1
BREATHING: 0
BODYLANGUAGE: 0
TOTALSCORE: 1
BODYLANGUAGE: 1
FACIALEXPRESSION: 2
NEGVOCALIZATION: 0
TOTALSCORE: 0
CONSOLABILITY: 0
CONSOLABILITY: 0
TOTALSCORE: 5
FACIALEXPRESSION: 0
FACIALEXPRESSION: 0
NEGVOCALIZATION: 1
BREATHING: 0
BODYLANGUAGE: 1
BREATHING: 0

## 2023-02-05 ASSESSMENT — PAIN SCALES - GENERAL: PAINLEVEL_OUTOF10: 0

## 2023-02-05 NOTE — PROGRESS NOTES
[address] [place] [address] Nephrology   [facility]. [employer]  [phone]  Nephrology Consult Note          Patient ID: Erasto Richardson  Referring/ Physician: Carlos Hope MD      HPI/Summary:     Erasto Richardson is being seen by nephrology for CKD and DIruesis. Patient is a 80 y.o. M who had a left nephrectomy in 2018. Before then, baseline creatinine was ~1.3. Creatinine is 1.8 > 2.2 > 1.9 post diuresis   BP is better   Urine is 1400 ml. Etienne catheter is in place         Plan:   No renal panel for today  The patient neuropathy for yesterday was slightly over 2 L  The patient's blood pressure was 158/91 the patient blood pressure has been running on the high side. Reviewed his medication list and for blood pressure control, he is on no blood pressure medications    The patient still has some leg edema  The patient's serum sodium level is on the low side, the last 1 was 135  Therefore, the best option for him for blood pressure control is probably 5 mg of amlodipine. Explained to the patient's son about his kidney function status and care plan. No other intervention needed from renal aspect. 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 Please call with any questions or concerns.     Amanda Padilla MD  [facility] Nephrology  Whitney Professor Srikanth Wallace [address] [address]  Fax: [phone]  Office: [phone]         CC/Reason for consult:   Reason for consult: CKD, Diuresis   Chief Complaint   Patient presents with    Back Pain    Leg Swelling     Pt reports to ED from [facility]. C/o back pain x 2 days. Per ems staff also report LLE has increased swelling with weeping edema            Review of Systems:   [address] All other remaining systems are negative. Constitutional:  fever, chills, weakness, weight change, fatigue,      Skin:  rash, pruritus, hair loss, bruising, dry skin, petechiae. Head, Face, Neck   headaches, swelling,  cervical adenopathy. Respiratory: shortness of breath, cough, or wheezing  Cardiovascular: chest pain, palpitations, dizzy, edema  Gastrointestinal: nausea, vomiting, diarrhea, constipation,belly pain    Yellow skin, blood in stool  Musculoskeletal:  back pain, muscle weakness, gait problems,       joint pain or swelling. Genitourinary:  dysuria, poor urine flow, flank pain, blood in urine  Neurologic:  vertigo, TIA'S, syncope, seizures, focal weakness  Psychosocial:  insomnia, anxiety, or depression. Additional positive findings: -     PMH/SH/FH:    Medical Hx: reviewed and updated as appropriate  Past Medical History:   Diagnosis Date    Adenocarcinoma of prostate ([facility]     Anxiety     Arthritis     Cancer ([facility]     skin    Cataract     CKD (chronic kidney disease) stage 3, GFR 30-59 ml/min (HCC)     Colon polyps     Dementia ([facility]     MRSA infection 03/13/2014    leg wound    Retroperitoneal sarcoma (HCC)     Severe hearing loss     Venous thromboembolism (VTE)          Surgical Hx: reviewed and updated as appropriate   has a past surgical history that includes Prostate surgery; joint replacement (Right, 2005); and Kidney removal (Left).      Social Hx: reviewed and updated as appropriate  Social History     Tobacco Use    Smoking status: Former     Types: Cigarettes     Quit date: 3/13/1980     Years since quitting: 42.9    Smokeless tobacco: Never    Tobacco comments:     pipe smoker    Substance Use Topics    Alcohol use: Yes     Comment: beer occasional        Family hx: reviewed and updated as appropriate  family history is not on file. Medications:   acetaminophen, 650 mg, 4x Daily  piperacillin-tazobactam, 3,375 mg, Q8H  enoxaparin, 1 mg/kg, Daily  lidocaine, 1 patch, Daily  sodium chloride flush, 5-40 mL, 2 times per day  sennosides-docusate sodium, 1 tablet, BID  [Held by provider] apixaban, 2.5 mg, Q12H  therapeutic multivitamin-minerals, , Daily     Morphine and Sulfamethoxazole-trimethoprim    Allergies: Allergies   Allergen Reactions    Morphine Other (See Comments)     Confusion, delirium    Sulfamethoxazole-Trimethoprim Rash     He claims he doesn't have allergies         Physical Exam/Objective:   Vitals:    02/05/23 0645   BP: (!) 158/91   Pulse: 96   Resp: 16   Temp: 98.3 °F (36.8 °C)   SpO2: 91%       Intake/Output Summary (Last 24 hours) at 2/5/2023 1615  Last data filed at 2/5/2023 0600  Gross per 24 hour   Intake 80 ml   Output 910 ml   Net -830 ml           General appearance: in no acute distress, comfortable, disorientated  HEENT: no icterus, EOM intact, trachea midline. Neck : no masses, appears symmetrical and no JVD appreciated. Respiratory: Respiratory effort normal, bilateral equal chest rise. No wheeze, no crackles   Cardiovascular: Ausculation shows RRR and  Some edema   Abdomen: abdomen is soft, non distended, no masses, no pain with palpation. Musculoskeletal:  no joint swelling, no deformity, strength grossly normal. Venous stasis changes BL LE  Skin: no rashes, no induration, no tightening, no jaundice   Neuro:  Disorientated, moves all extremities spontaneously. Data:   CBC:   No results for input(s): WBC, HGB, HCT, PLT in the last 72 hours.     BMP:    Recent Labs     02/03/23  0732 02/04/23  0859    135*   K 3.4* 4.2   CL 99 102   CO2 30 20*   BUN 43* 38*   CREATININE 1. 9* 1.7*   GLUCOSE 122* 129*   PHOS 2.4* 2.1*         Thanks  Nephrology  [address]  Office: [phone]    Fax: [phone]

## 2023-02-05 NOTE — PROGRESS NOTES
More confused now starting to , talk about soft ball and him dog , remains , calm , change of shift , rn aware .

## 2023-02-05 NOTE — CARE COORDINATION
Case management is following for discharge planning. The chart was reviewed. [facility] has sent referrals to multiple facilities. Spoke with [employer] liaison this morning. There is no availability until the middle of next week. Spoke with Evens Anaya in admissions at [facility]. They can accept. The pt will need [facility] pre-cert for admission. A call was placed to Mr. Gunjan Ford son, Andrea Salguero (who has been active with [facility]), to see if he would be in agreement with moving forward with the[facility]cert [facility]. Waiting to hear back.     Electronically signed by Shan Lerma, MARRY RN-[facility]  Case Management  [phone]

## 2023-02-05 NOTE — PROGRESS NOTES
Speech Language Pathology    New order for dysphagia evaluation received and chart reviewed. Pt seen by SLP for BSE 2/3/23 and recs were for regular consistency + thin liquids with d/c from dysphagia tx. D/W MD via [employer]. Per Dr. Scotty Robins, pt coughing with PO this date. As BSE already completed and unrevealing but ongoing concerns for dysphagia, recommend MBS procedure for full evaluation of swallow. Dr. Germán Calle in agreement. Will plan to complete MBS tomorrow when able to be scheduled with radiology department.     Kathy Reyes MA CCC-SLP; [id number]  Speech-Language Pathologist  Pager # [phone]  Phone # [phone]  Office # [phone]

## 2023-02-05 NOTE — PLAN OF CARE
Problem: Safety - Adult  Goal: Free from fall injury  Note: Alert to person , aware of , family , confused , calm , comfortable , lower extremites war , soft , scattered bruises , tolerates total e feed , take about 10 bites and says he is finished , guzman in place , washed with soap and water , on aot mattress , continue to turn and reposition frequently

## 2023-02-05 NOTE — PROGRESS NOTES
Hospitalist Progress Note      Name:  Renaldo Kent /Age/Sex: 5/3/1930  (80 y.o. male)   MRN & CSN:  [id number] & [id number] Admission Date/Time: 2023 12:57 PM   Location:  3202/2731-28 PCP: Gallo Bonilla Day: 10    Assessment and Plan:     Acute metabolic encephalopathy:  Head CT was non acute  Improving off narcotics but not at baseline. Added to [employer].  Off restraints and also off sedatives. Continue with rehab efforts and re orientation. R leg cellulitis and CHF  Presented with more swelling , erythema and pain of r leg  Treated with cefazolin. Improved after IV lasix. Venous doppler showed only chronic DVT, nothing acute  ECHO with diastolic dysfunction  BALA hose as tolerated    Possible pneumonia:  Cough present, rhonchi, CXR with possible infiltrate left lower  Cont  Zosyn. Stop linezolid as MRSA negative. Acute lower back pain : MRI of back show moderate stenosis of L3-4   Consulted neurosurgery, advised to get CT T spine. No acute pathology found on spinal imaging. Fall precaution, Tylenol for pain control, lidocaine patch    CKD 3: serum creatinine ~ 1.7 his baseline. Has solitary kidney   Nephrology is following for gentle diuresis  Holding off on diuresis for now as per nephrology    Recurrent DVT/PE:  At home on eliquis, declines home meds here  On lovenox as substitute until able to tolerate pills well    Urinary retention:  Failed voiding trials. H/o remote prostatectomy per family. Seen by urology, advised to keep guzman and voiding trial when stronger. Dispo: Okay to start pre cert once have facility. Okay to DC once placement is arranged. History of Present Illness:     Patient is oriented to self only. Initially did not follow commands, but improved after family arrived to bedside. Objective:      Intake/Output Summary (Last 24 hours) at 2023 1048  Last data filed at 2023 0600  Gross per 24 hour   Intake 80 ml   Output 2060 ml   Net -1980 ml        Vitals:   Vitals:    02/05/23 0645   BP: (!) 158/91   Pulse: 96   Resp: 16   Temp: 98.3 °F (36.8 °C)   SpO2: 91%     Physical Exam:   GEN Awake.  Alert , not in respiratory distress, not in pain  HEENT: PEERLA, , supple neck,   Chest: rhonchi present and diminished on the left  Heart: S1 and S2 heard, no murmur, no gallop or rub, regular rate  Abdomen: soft, ND , Nt, +BS  Extremities: resolved erythema and improved edema LE  Neurology: calm, cooperative, able to say his name, follows simple commands    Medications:   Medications:    acetaminophen  650 mg Oral 4x Daily    piperacillin-tazobactam  3,375 mg IntraVENous Q8H    enoxaparin  1 mg/kg SubCUTAneous Daily    lidocaine  1 patch TransDERmal Daily    sodium chloride flush  5-40 mL IntraVENous 2 times per day    sennosides-docusate sodium  1 tablet Oral BID    [Held by provider] apixaban  2.5 mg Oral Q12H    therapeutic multivitamin-minerals   Oral Daily      Infusions:    sodium chloride 25 mL/hr at 02/01/23 1631     PRN Meds: perflutren lipid microspheres, 1.5 mL, ONCE PRN  sodium chloride flush, 5-40 mL, PRN  sodium chloride, , PRN  ondansetron, 4 mg, Q8H PRN   Or  ondansetron, 4 mg, Q6H PRN  polyethylene glycol, 17 g, Daily PRN  acetaminophen, 650 mg, Q6H PRN   Or  acetaminophen, 650 mg, Q6H PRN  magnesium sulfate, 2,000 mg, PRN  potassium chloride, 40 mEq, PRN   Or  potassium alternative oral replacement, 40 mEq, PRN   Or  potassium chloride, 10 mEq, PRN  aluminum & magnesium hydroxide-simethicone, 30 mL, Q6H PRN        Electronically signed by Nicky Garcia MD on 2/5/2023 at 10:48 AM

## 2023-02-05 NOTE — PROGRESS NOTES
Pt is alert but oriented self only. No acute changes overnight,performed Etienne care. Encouraged oral fluid and frequent position changing. all fall precaution are in place. call light within a reach. bed is lowest position. Bed Alarm is on for safety. Will continue monitor . Elijah Postal ...

## 2023-02-05 NOTE — PLAN OF CARE
Problem: Discharge Planning  Goal: Discharge to home or other facility with appropriate resources  Outcome: Not Progressing     Problem: Confusion  Goal: Confusion, delirium, dementia, or psychosis is improved or at baseline  Description: INTERVENTIONS:  1. Assess for possible contributors to thought disturbance, including medications, impaired vision or hearing, underlying metabolic abnormalities, dehydration, psychiatric diagnoses, and notify attending LIP  2. [place] high risk fall precautions, as indicated  3. Provide frequent short contacts to provide reality reorientation, refocusing and direction  4. Decrease environmental stimuli, including noise as appropriate  5. Monitor and intervene to maintain adequate nutrition, hydration, elimination, sleep and activity  6. If unable to ensure safety without constant attention obtain sitter and review sitter guidelines with assigned personnel  7. Initiate Psychosocial CNS and Spiritual Care consult, as indicated  2/5/2023 0749 by Sue Morgan RN  Outcome: Not Progressing  Unsure of pt baseline, needs constant redirecting and cuing.   Problem: Pain  Pt showing no signs of pain or distress     Problem: ABCDS Injury Assessment  Goal: Absence of physical injury  Outcome: Progressing

## 2023-02-05 NOTE — PLAN OF CARE
Problem: Pain  Goal: Verbalizes/displays adequate comfort level or baseline comfort level  Outcome: Progressing     Problem: Safety - Adult  Goal: Free from fall injury  2/5/2023 0419 by Cirilo Carvajal RN  Outcome: Progressing       Problem: Confusion  Goal: Confusion, delirium, dementia, or psychosis is improved or at baseline  Description: INTERVENTIONS:  1. Assess for possible contributors to thought disturbance, including medications, impaired vision or hearing, underlying metabolic abnormalities, dehydration, psychiatric diagnoses, and notify attending LIP  2. [place] high risk fall precautions, as indicated  3. Provide frequent short contacts to provide reality reorientation, refocusing and direction  4. Decrease environmental stimuli, including noise as appropriate  5. Monitor and intervene to maintain adequate nutrition, hydration, elimination, sleep and activity  6. If unable to ensure safety without constant attention obtain sitter and review sitter guidelines with assigned personnel  7. Initiate Psychosocial CNS and Spiritual Care consult, as indicated  Outcome: Progressing     Problem: Skin/Tissue Integrity  Goal: Absence of new skin breakdown  Description: 1. Monitor for areas of redness and/or skin breakdown  2. Assess vascular access sites hourly  3. Every 4-6 hours minimum:  Change oxygen saturation probe site  4. Every 4-6 hours:  If on nasal continuous positive airway pressure, respiratory therapy assess nares and determine need for appliance change or resting period.   Outcome: Progressing

## 2023-02-06 ENCOUNTER — APPOINTMENT (OUTPATIENT)
Dept: GENERAL RADIOLOGY | Age: 88
DRG: 551 | End: 2023-02-06
Payer: MEDICARE

## 2023-02-06 PROCEDURE — 6360000002 HC RX W HCPCS: Performed by: INTERNAL MEDICINE

## 2023-02-06 PROCEDURE — 6370000000 HC RX 637 (ALT 250 FOR IP): Performed by: STUDENT IN AN ORGANIZED HEALTH CARE EDUCATION/TRAINING PROGRAM

## 2023-02-06 PROCEDURE — 92611 MOTION FLUOROSCOPY/SWALLOW: CPT

## 2023-02-06 PROCEDURE — 6370000000 HC RX 637 (ALT 250 FOR IP): Performed by: INTERNAL MEDICINE

## 2023-02-06 PROCEDURE — 1200000000 HC SEMI PRIVATE

## 2023-02-06 PROCEDURE — 92526 ORAL FUNCTION THERAPY: CPT

## 2023-02-06 PROCEDURE — 74230 X-RAY XM SWLNG FUNCJ C+: CPT

## 2023-02-06 PROCEDURE — 2580000003 HC RX 258: Performed by: INTERNAL MEDICINE

## 2023-02-06 RX ORDER — FUROSEMIDE 20 MG/1
20 TABLET ORAL DAILY
Qty: 60 TABLET | Refills: 3 | DISCHARGE
Start: 2023-02-06

## 2023-02-06 RX ORDER — AMLODIPINE BESYLATE 5 MG/1
5 TABLET ORAL DAILY
Qty: 30 TABLET | Refills: 3 | DISCHARGE
Start: 2023-02-06

## 2023-02-06 RX ORDER — FUROSEMIDE 20 MG/1
20 TABLET ORAL DAILY
Status: DISCONTINUED | OUTPATIENT
Start: 2023-02-06 | End: 2023-02-07 | Stop reason: HOSPADM

## 2023-02-06 RX ORDER — AMOXICILLIN AND CLAVULANATE POTASSIUM 500; 125 MG/1; MG/1
1 TABLET, FILM COATED ORAL 2 TIMES DAILY
Qty: 4 TABLET | Refills: 0 | DISCHARGE
Start: 2023-02-06 | End: 2023-02-08

## 2023-02-06 RX ADMIN — PIPERACILLIN AND TAZOBACTAM 3375 MG: 3; .375 INJECTION, POWDER, LYOPHILIZED, FOR SOLUTION INTRAVENOUS at 06:24

## 2023-02-06 RX ADMIN — ENOXAPARIN SODIUM 90 MG: 100 INJECTION SUBCUTANEOUS at 09:36

## 2023-02-06 RX ADMIN — FUROSEMIDE 20 MG: 20 TABLET ORAL at 09:04

## 2023-02-06 RX ADMIN — ACETAMINOPHEN 650 MG: 325 TABLET, FILM COATED ORAL at 09:04

## 2023-02-06 RX ADMIN — AMLODIPINE BESYLATE 5 MG: 5 TABLET ORAL at 09:04

## 2023-02-06 RX ADMIN — PIPERACILLIN AND TAZOBACTAM 3375 MG: 3; .375 INJECTION, POWDER, LYOPHILIZED, FOR SOLUTION INTRAVENOUS at 22:59

## 2023-02-06 RX ADMIN — SENNOSIDES AND DOCUSATE SODIUM 1 TABLET: 50; 8.6 TABLET ORAL at 09:04

## 2023-02-06 RX ADMIN — MULTIPLE VITAMINS W/ MINERALS TAB 1 TABLET: TAB at 09:04

## 2023-02-06 RX ADMIN — PIPERACILLIN AND TAZOBACTAM 3375 MG: 3; .375 INJECTION, POWDER, LYOPHILIZED, FOR SOLUTION INTRAVENOUS at 15:56

## 2023-02-06 ASSESSMENT — ENCOUNTER SYMPTOMS
VOMITING: 0
DIARRHEA: 0
NAUSEA: 0
SHORTNESS OF BREATH: 0
BACK PAIN: 1

## 2023-02-06 ASSESSMENT — PAIN SCALES - PAIN ASSESSMENT IN ADVANCED DEMENTIA (PAINAD)
CONSOLABILITY: 0
BREATHING: 0
FACIALEXPRESSION: 0
TOTALSCORE: 0
NEGVOCALIZATION: 0
BODYLANGUAGE: 0

## 2023-02-06 NOTE — PROGRESS NOTES
Consulted for R Lower Leg. Pt has venous stasis. Per RN and pt's family member, leg was edematous and weeping. R Lower Leg is no longer edematous and no weeping. RN applied moisturizing lotion and applied roll gauze and ace wrap for compression. Wound care orders placed to continue same treatment. Wound Care to sign off. Please re-consult for changes or deterioration.

## 2023-02-06 NOTE — PROGRESS NOTES
Hospitalist Progress Note      Name:  Yang Ying /Age/Sex: 5/3/1930  (80 y.o. male)   MRN & CSN:  [id number] & [id number] Admission Date/Time: 2023 12:57 PM   Location:  3719/9886-04 PCP: Gallo [facility] Day: 11    Assessment and Plan:     Acute metabolic encephalopathy:  Head CT was non acute  Improving off narcotics but not at baseline. [facility] to [facility].  Off restraints and also off sedatives. Continue with rehab efforts and re orientation. R leg cellulitis and CHF  Presented with more swelling , erythema and pain of r leg  Treated with cefazolin. Improved after IV lasix. Venous doppler showed only chronic DVT, nothing acute  ECHO with diastolic dysfunction  BALA hose as tolerated, wound care    Pneumonia:  Cough present, rhonchi, CXR with possible infiltrate left lower  Cont  Zosyn, last day Tuesday. off linezolid as MRSA negative. Acute lower back pain : MRI of back show moderate stenosis of L3-4   Consulted neurosurgery, advised to get CT T spine. No acute pathology found on spinal imaging. Fall precaution, Tylenol for pain control, lidocaine patch    CKD 3: serum creatinine ~ 1.7 his baseline. Has solitary kidney   Nephrology is following for gentle diuresis  Plan to discharge on lasix 20 daily    Recurrent DVT/PE:  At home on eliquis, declines meds at times here  On lovenox as substitute   Eliquis on discharge- on 2.5 mg BID at home as per his hematologist     Urinary retention:  Failed voiding trials. H/o remote prostatectomy per family. Seen by urology, advised to keep guzman and voiding trial when stronger. Dispo: Okay to start pre cert once have facility. Okay to DC once placement is arranged. History of Present Illness:     Patient is awake and cooperative. Smiling. Ate well for RN today. Guzman draining. Objective:      Intake/Output Summary (Last 24 hours) at 2023 1140  Last data filed at 2023 0600  Gross per 24 hour   Intake 0 ml   Output 1050 ml   Net -1050 ml        Vitals:   Vitals:    02/06/23 0730   BP: (!) 161/84   Pulse:    Resp:    Temp:    SpO2:      Physical Exam:   GEN Awake.  Alert , not in respiratory distress, not in pain  HEENT: PEERLA, , supple neck,   Chest: rhonchi present and diminished on the left  Heart: S1 and S2 heard, no murmur, no gallop or rub, regular rate  Abdomen: soft, ND , Nt, +BS  Extremities: resolved erythema and improved edema LE  Neurology: calm, cooperative, able to say his name, follows simple commands    Medications:   Medications:    furosemide  20 mg Oral Daily    amLODIPine  5 mg Oral Daily    acetaminophen  650 mg Oral 4x Daily    piperacillin-tazobactam  3,375 mg IntraVENous Q8H    enoxaparin  1 mg/kg SubCUTAneous Daily    lidocaine  1 patch TransDERmal Daily    sodium chloride flush  5-40 mL IntraVENous 2 times per day    sennosides-docusate sodium  1 tablet Oral BID    [Held by provider] apixaban  2.5 mg Oral Q12H    therapeutic multivitamin-minerals   Oral Daily      Infusions:    sodium chloride 10 mL/hr at 02/05/23 2252     PRN Meds: perflutren lipid microspheres, 1.5 mL, ONCE PRN  sodium chloride flush, 5-40 mL, PRN  sodium chloride, , PRN  ondansetron, 4 mg, Q8H PRN   Or  ondansetron, 4 mg, Q6H PRN  polyethylene glycol, 17 g, Daily PRN  acetaminophen, 650 mg, Q6H PRN   Or  acetaminophen, 650 mg, Q6H PRN  magnesium sulfate, 2,000 mg, PRN  potassium chloride, 40 mEq, PRN   Or  potassium alternative oral replacement, 40 mEq, PRN   Or  potassium chloride, 10 mEq, PRN  aluminum & magnesium hydroxide-simethicone, 30 mL, Q6H PRN        Electronically signed by Wil Turner MD on 2/6/2023 at 11:40 AM

## 2023-02-06 NOTE — PROGRESS NOTES
Physician Progress Note      PATIENT:               Denver Hennessy  CSN #:                  [id number]  :                       5/3/1930  ADMIT DATE:       2023 12:57 PM  [address] [place] DATE:  Alonso Kincaid  PROVIDER #:        Duyen Red MD          QUERY TEXT:    Pt admitted with low back pain and cellulitis of LLE and has CHF   documented. from  through . Per  ECHO, EF: 50% If possible,   please document in progress notes and discharge summary further specificity   regarding the type and acuity of CHF:    The medical record reflects the following:  Risk Factors: 80 y.o. female with hx of prostate cancer, arthritis, anxiety,   dementia  Clinical Indicators: Per H&P: bilateral lower extremity edema, nonpitting  Treatment: Lasix 20mg IV daily  Options provided:  -- Chronic Diastolic CHF  -- Acute on Diastolic CHF  -- Other - I will add my own diagnosis  -- Disagree - Not applicable / Not valid  -- Disagree - Clinically unable to determine / Unknown  -- Refer to Clinical Documentation Reviewer    PROVIDER RESPONSE TEXT:    This patient has chronic diastolic CHF.     Query created by: Vanita Plunkett on 2023 7:59 AM      Electronically signed by:  Duyen Red MD 2023 8:57 AM

## 2023-02-06 NOTE — CARE COORDINATION
CM following: CM made phone calls to SNF's this AM.  [facility] - can't accept- [facility] - reviewing   [facility] - can accept  [facility] - can accept  CM to follow up with family on updates and identify a SNF to begin precert today.   Electronically signed by MARGI Fairbanks on 2/6/2023 at 9:36 AM  [phone]

## 2023-02-06 NOTE — PROGRESS NOTES
Pt is alert but oriented self only. Stable vitals on RA. No acute changes overnight, pt slept most of the night. performed Etienne care. all fall precaution are in place. bed is lowest position. Bed Alarm is on for safety. Will continue monitor . Edda Rhodes ...

## 2023-02-06 NOTE — PROGRESS NOTES
[address] [place] [address] Nephrology   [facility]  [phone]  Nephrology Consult Note          Patient ID: Stephany Mercer  Referring/ Physician: Jessee Calles MD      HPI/Summary:     Stephany Mercer is being seen by nephrology for CKD and DIruesis. Patient is a 80 y.o. M who had a left nephrectomy in 2018. Before then, baseline creatinine was ~1.3. Creatinine is 1.8 > 2.2 > 1.7 post diuresis   BP is better but slightly high   Urine is 49091 ml. 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 would like to thank you for the opportunity to serve this patient. Please call with any questions or concerns. William Oneil MD  [facility]  [address], [facility] [address]  Fax: [phone]  Office: [phone]         CC/Reason for consult:   Reason for consult: CKD, Diuresis   Chief Complaint   Patient presents with    Back Pain    Leg Swelling     Pt reports to ED from [facility]. C/o back pain x 2 days. Per ems staff also report LLE has increased swelling with weeping edema            Review of Systems:   [address] All other remaining systems are negative. Constitutional:  fever, chills, weakness, weight change, fatigue,      Skin:  rash, pruritus, hair loss, bruising, dry skin, petechiae. Head, Face, Neck   headaches, swelling,  cervical adenopathy.      Respiratory: shortness of breath, cough, or wheezing  Cardiovascular: chest pain, palpitations, dizzy, edema  Gastrointestinal: nausea, vomiting, diarrhea, constipation,belly pain Yellow skin, blood in stool  Musculoskeletal:  back pain, muscle weakness, gait problems,       joint pain or swelling. Genitourinary:  dysuria, poor urine flow, flank pain, blood in urine  Neurologic:  vertigo, TIA'S, syncope, seizures, focal weakness  Psychosocial:  insomnia, anxiety, or depression. Additional positive findings: -     PMH/SH/FH:    Medical Hx: reviewed and updated as appropriate  Past Medical History:   Diagnosis Date    Adenocarcinoma of prostate ([facility]     Anxiety     Arthritis     Cancer ([facility]     skin    Cataract     CKD (chronic kidney disease) stage 3, GFR 30-59 ml/min (HCC)     Colon polyps     Dementia ([facility]     MRSA infection 2014    leg wound    Retroperitoneal sarcoma (HCC)     Severe hearing loss     Venous thromboembolism (VTE)          Surgical Hx: reviewed and updated as appropriate   has a past surgical history that includes Prostate surgery; joint replacement (Right, ); and Kidney removal (Left). Social Hx: reviewed and updated as appropriate  Social History     Tobacco Use    Smoking status: Former     Types: Cigarettes     Quit date: 3/13/1980     Years since quittin.9    Smokeless tobacco: Never    Tobacco comments:     pipe smoker    Substance Use Topics    Alcohol use: Yes     Comment: beer occasional        Family hx: reviewed and updated as appropriate  family history is not on file. Medications:   amLODIPine, 5 mg, Daily  acetaminophen, 650 mg, 4x Daily  piperacillin-tazobactam, 3,375 mg, Q8H  enoxaparin, 1 mg/kg, Daily  lidocaine, 1 patch, Daily  sodium chloride flush, 5-40 mL, 2 times per day  sennosides-docusate sodium, 1 tablet, BID  [Held by provider] apixaban, 2.5 mg, Q12H  therapeutic multivitamin-minerals, , Daily     Morphine and Sulfamethoxazole-trimethoprim    Allergies:    Allergies   Allergen Reactions    Morphine Other (See Comments)     Confusion, delirium    Sulfamethoxazole-Trimethoprim Rash     He claims he doesn't have allergies Physical Exam/Objective:   Vitals:    02/06/23 0730   BP: (!) 161/84   Pulse:    Resp:    Temp:    SpO2:        Intake/Output Summary (Last 24 hours) at 2/6/2023 0845  Last data filed at 2/6/2023 0600  Gross per 24 hour   Intake 0 ml   Output 1050 ml   Net -1050 ml           General appearance: in no acute distress, comfortable, disorientated  HEENT: no icterus, EOM intact, trachea midline. Neck : no masses, appears symmetrical and no JVD appreciated. Respiratory: Respiratory effort normal, bilateral equal chest rise. No wheeze, no crackles   Cardiovascular: Ausculation shows RRR and  Some edema   Abdomen: abdomen is soft, non distended, no masses, no pain with palpation. Musculoskeletal:  no joint swelling, no deformity, strength grossly normal. Venous stasis changes BL LE  Skin: no rashes, no induration, no tightening, no jaundice   Neuro:  Disorientated, moves all extremities spontaneously. Data:   CBC:   No results for input(s): WBC, HGB, HCT, PLT in the last 72 hours.     BMP:    Recent Labs     02/04/23  0859   *   K 4.2      CO2 20*   BUN 38*   CREATININE 1.7*   GLUCOSE 129*   PHOS 2.1*         Thanks  Nephrology  [address]  Office: [phone]    Fax: [phone]

## 2023-02-06 NOTE — PROGRESS NOTES
Occupational Therapy/Physical Therapy  Off floor  Pt leaving floor for procedure. Will follow up as schedule allows and per OT /PT POC. Dieudonne November.  Jabier Carrera, OTR/L #[id number]  Kathy Aldana, PT

## 2023-02-06 NOTE — PROGRESS NOTES
Urology Attending Progress Note      Subjective: Patient alert but confused    Vitals:  BP (!) 161/84   Pulse 80   Temp 98.2 °F (36.8 °C) (Axillary)   Resp 16   Ht 5' 8\" (1.727 m)   Wt 166 lb 7.2 oz (75.5 kg)   SpO2 94%   BMI 25.31 kg/m²   Temp  Av.1 °F (36.7 °C)  Min: 97.9 °F (36.6 °C)  Max: 98.2 °F (36.8 °C)    Intake/Output Summary (Last 24 hours) at 2023 0844  Last data filed at 2023 0600  Gross per 24 hour   Intake 0 ml   Output 1050 ml   Net -1050 ml         Exam: Urine clear in guzman    Labs:  WBC:    Lab Results   Component Value Date/Time    WBC 7.6 2023 07:46 AM     Hemoglobin/Hematocrit:    Lab Results   Component Value Date/Time    HGB 12.2 2023 07:46 AM    HCT 36.2 2023 07:46 AM     BMP:    Lab Results   Component Value Date/Time     2023 08:59 AM    K 4.2 2023 08:59 AM    K 4.0 2023 08:46 AM     2023 08:59 AM    CO2 20 2023 08:59 AM    BUN 38 2023 08:59 AM    LABALBU 2.7 2023 08:59 AM    CREATININE 1.7 2023 08:59 AM    CALCIUM 8.8 2023 08:59 AM    GFRAA 46 2022 07:47 AM    LABGLOM 37 2023 08:59 AM     PT/INR:    Lab Results   Component Value Date/Time    PROTIME 12.6 2022 08:36 PM    INR 1.11 2022 08:36 PM     PTT:    Lab Results   Component Value Date/Time    APTT 30.3 2022 08:36 PM   [APTT    Impression/Plan: 79yo M with h/o prostatectomy now with urinary retention.    -Urine clear  -Nurse reporting that patient has been non-ambulatory. Would recommend keeping catheter in place at this time and removing as outpatient at his facility.   -Please call with any questions.  Will see PRN at this time    Cynthia Lombardi

## 2023-02-06 NOTE — PLAN OF CARE
Problem: Pain  Goal: Verbalizes/displays adequate comfort level or baseline comfort level  Outcome: Progressing     Problem: Safety - Adult  Goal: Free from fall injury  Outcome: Progressing     Problem: Confusion  Goal: Confusion, delirium, dementia, or psychosis is improved or at baseline  Description: INTERVENTIONS:  1. Assess for possible contributors to thought disturbance, including medications, impaired vision or hearing, underlying metabolic abnormalities, dehydration, psychiatric diagnoses, and notify attending LIP  2. [place] high risk fall precautions, as indicated  3. Provide frequent short contacts to provide reality reorientation, refocusing and direction  4. Decrease environmental stimuli, including noise as appropriate  5. Monitor and intervene to maintain adequate nutrition, hydration, elimination, sleep and activity  6. If unable to ensure safety without constant attention obtain sitter and review sitter guidelines with assigned personnel  7. Initiate Psychosocial CNS and Spiritual Care consult, as indicated  Outcome: Progressing     Problem: Skin/Tissue Integrity  Goal: Absence of new skin breakdown  Description: 1. Monitor for areas of redness and/or skin breakdown  2. Assess vascular access sites hourly  3. Every 4-6 hours minimum:  Change oxygen saturation probe site  4. Every 4-6 hours:  If on nasal continuous positive airway pressure, respiratory therapy assess nares and determine need for appliance change or resting period.   Outcome: Progressing     Problem: Nutrition Deficit:  Goal: Optimize nutritional status  Outcome: Progressing

## 2023-02-06 NOTE — PROCEDURES
INSTRUMENTAL SWALLOW REPORT  MODIFIED BARIUM SWALLOW/treatment    NAME: Sowmya Bravo   : 5/3/1930  MRN: [id number]       Date of Eval: 2023     Ordering Physician: Dr Jose Antonio Maharaj  Radiologist: Dr Cici Martinez     Referring Diagnosis(es): Referring Diagnosis: cellulitis    Past Medical History:  has a past medical history of Adenocarcinoma of prostate ([facility] Anxiety, Arthritis, Cancer ([address] Cataract, CKD (chronic kidney disease) stage 3, GFR 30-59 ml/min ([address] Colon polyps, Dementia ([address] MRSA infection, Retroperitoneal sarcoma ([facility] Severe hearing loss, and Venous thromboembolism (VTE). Past Surgical History:  has a past surgical history that includes Prostate surgery; joint replacement (Right, ); and Kidney removal (Left). Type of Study: Initial MBS     CXR (2023): Impression       Patchy consolidation in the left lung base. Correlate for pneumonia. Right lung is clear. Normal cardiac mediastinal silhouette. Scoliosis. CT head(2023): Impression   1. Mild atrophy. 2. Patchy areas of decreased white matter attenuation. The findings are nonspecific, but most likely represent chronic small vessel ischemic change. 3.  No evidence of an acute intracranial process. Patient Complaints/Reason for Referral:  Sowmya Bravo was referred for a MBS to assess the efficiency of his/her swallow function, assess for aspiration, and to make recommendations regarding safe dietary consistencies, effective compensatory strategies, and safe eating environment. Onset of problem:   Date of Onset: 22    Behavior/Cognition/Vision/Hearing:  Behavior/Cognition: Alert; Cooperative  Vision: Impaired  Vision Exceptions: Wears glasses at all times  Hearing: Exceptions to [facility]  Hearing Exceptions: Hard of hearing/hearing concerns    Impressions:  Pt exhibiting mild oropharyngeal dysphagia.  Pt demonstrates prolonged mastication with solid texture, however clears oral cavity completely. Bolus spills over base of tongue into valleculae and pyriform sinus cavities. Reduced base of tongue retraction and reduced hyolaryngeal mechanics result in residue in valleculae and pyriforms. Effortful swallow and liquid wash clear residue. Mild penetration noted with thin liquids via cup and straw, does not clear completely, however there was no aspiration identified with multiple trials presented. Bolus transfers slowly through UES, however no obvious backflow noted. Recommend downgrade texture to soft and bite sized due to oral phase impairment and pharyngeal residue. Treatment Dx and ICD 10: dysphagia   Patient Position: Lateral and 90 degrees    Consistencies Administered: Regular;Pureed; Thin cup; Thin teaspoon; Thin straw    Dysphagia Outcome Severity Scale: Level 5: Mild dysphagia- Distant supervision. May need one diet consistency restricted  Penetration-Aspiration Scale (PAS): 3 - Material enters the airway, remains above the vocal folds, and is not ejected from airway    Recommended Diet:  Solid consistency: Soft and Bite-Sized  Liquid consistency: Thin  Liquid administration via: Cup;Straw    Medication administration:  (whole with water, place in puree if needed)    Safe Swallow Protocol:  Supervision: Distant  Alternate solids and liquids  Small bites/sips  Eat/Feed slowly  Upright as possible for all oral intake  Remain upright for 30-45 minutes after meals    Recommendations/Treatment  Requires SLP Intervention: Yes   D/C Recommendations:  To be determined     Recommended Exercises: effortful swallow   Therapeutic Interventions: Patient/Family education;Diet tolerance monitoring;Oral care    Education: Images and recommendations were reviewed with pt following this exam.   Patient Education: pt educated to results of MBS  Patient Education Response: Needs reinforcement         Goals:    Pt to be seen to address the following goals:  1- Pt will participate in MBS  2/6: goal met    2- The patient Lauren Hicks will verbalize/demonstrate understanding of dysphagia recommendations  2/6:  pt educate to results of MBS. Replayed portion of video and explained anatomy/physiology related to swallowing and rationale for diet recommendations/strategies to improve safety of swallow. Pt will require cont education/reinforcement  Cont goal    New goal:  3-  Pt will consume PO safely without signs or symptoms of aspiration      Pain - none indicated       Therapy Time:   Individual Concurrent Group Co-treatment   Time In 1005         Time Out 1030         Minutes 25            Plan  Recommended diet: downgrade to soft and bite sized/thin liquids  Needs met prior to leaving radiology, results d/w JAYLIN Mason M.S./[facility] #[id number]  Pg.  # [id number]  This document will serve as a dc summary if pt dc prior to next visit        2/6/2023, 11:20 AM

## 2023-02-06 NOTE — CARE COORDINATION
CM following: CM met with pt and son, Livia Johnson, at bedside with nurse care team. CM reviewed accepting SNFs and son selected [facility] stating that they have a family member that works at [facility] [employer]. BE has asked Cordatia with [facility] to begin precert today. CM will continue to follow for discharge planning.   Electronically signed by MARGI Leblanc on 2/6/2023 at 11:21 AM  [phone]

## 2023-02-06 NOTE — DISCHARGE INSTR - COC
Continuity of Care Form    Patient Name: Rex Akbar   :  5/3/1930  MRN:  [id number]    [facility] date:  2023  Discharge date:  ***    Code Status Order: DNR-CCA   Advance Directives:     Admitting Physician:  Lolly Hill MD  PCP: Benton Redman DO    Discharging Nurse: [facility] Unit/Room#: 3108/9730-23  Discharging Unit Phone Number: ***    Emergency Contact:   Extended Emergency Contact Information  Primary Emergency Contact: [address] [place] [address] Phone: [phone]  Mobile Phone: [phone]  Relation: Child  Secondary Emergency Contact: Babar Gonzalez  Lane Phone: [phone]  Mobile Phone: [phone]  Relation: Child    Past Surgical History:  Past Surgical History:   Procedure Laterality Date    JOINT REPLACEMENT Right 2005    hip    KIDNEY REMOVAL Left     PROSTATE SURGERY         Immunization History:   Immunization History   Administered Date(s) Administered    COVID-19, PFIZER PURPLE top, DILUTE for use, (age 15 y+), 30mcg/0.3mL 2021, 2021, 10/19/2021    Influenza Virus Vaccine 10/28/2004, 2005, 2006, 10/31/2007, 10/27/2008, 2009, 10/08/2010, 2011, 10/13/2013    Influenza Whole 10/13/2013    Influenza, High Dose (Fluzone 65 yrs and older) 2012, 2013, 10/15/2014, 2015, 10/13/2016, 10/21/2017, 10/17/2018, 10/23/2019, 10/05/2020    Pneumococcal Conjugate 13-valent (Qoocldy78) 2015    Pneumococcal Polysaccharide (Biafrcrnw51) 2005, 2014    Tdap (Boostrix, Adacel) 2012       Active Problems:  Patient Active Problem List   Diagnosis Code    DVT (deep venous thrombosis) ([facility] I82.409    Pulmonary embolism ([facility] I26.99    Cancer ([facility] C80.1    Chest pain R07.9    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 ([facility] L97.313    HCAP (healthcare-associated pneumonia) J18.9    Acute low back pain M54.50    Left leg cellulitis L03.116    Ambulatory dysfunction R26.2    Chronic anticoagulation Z79.01 Acute low back pain with disc symptoms, duration less than 6 weeks M54.50, M51.9    Venous stasis dermatitis of both lower extremities I87.2    Acute back pain with radiculopathy M54.10       Isolation/Infection:   Isolation            No Isolation          Patient Infection Status       Infection Onset Added Last Indicated Last Indicated By Review Planned Expiration Resolved Resolved By    None active    Resolved    MRSA  03/17/14 03/17/14 Janeann Risk, RN   09/24/15 Janeann Risk, RN    3/13/14            Nurse Assessment:  Last Vital Signs: BP (!) 161/84   Pulse 80   Temp 98.2 °F (36.8 °C) (Axillary)   Resp 16   Ht 5' 8\" (1.727 m)   Wt 166 lb 7.2 oz (75.5 kg)   SpO2 94%   BMI 25.31 kg/m²     Last documented pain score (0-10 scale): Pain Level: 0  Last Weight:   Wt Readings from Last 1 Encounters:   02/06/23 166 lb 7.2 oz (75.5 kg)     Mental Status:  disoriented    IV Access:  - None    Nursing Mobility/ADLs:  Walking   Assisted  Transfer  Assisted  Bathing  Assisted  Dressing  Assisted  Toileting  Assisted  Feeding  Independent  Med Admin  Assisted  Med Delivery   crushed and applesauce    Wound Care Documentation and Therapy:        Elimination:  Continence: Bowel: Yes  Bladder: No  Urinary Catheter: Insertion Date: 2/2/23    Colostomy/Ileostomy/Ileal Conduit: No       Date of Last BM: 2/7/23    Intake/Output Summary (Last 24 hours) at 2/6/2023 0841  Last data filed at 2/6/2023 0600  Gross per 24 hour   Intake 0 ml   Output 1050 ml   Net -1050 ml     I/O last 3 completed shifts: In: [de-identified] [P.O.:80]  Out: 1960 [Urine:1960]    Safety Concerns:      At Risk for Falls    Impairments/Disabilities:      None    Nutrition Therapy:  Current Nutrition Therapy:   - Oral Diet:  Dysphagia 2 mechanically altered    Routes of Feeding: Oral  Liquids: No Restrictions  Daily Fluid Restriction: no  Last Modified Barium Swallow with Video (Video Swallowing Test): done on 2/6/23    Treatments at the [facility] Discharge:   Respiratory Treatments: ***  Oxygen Therapy:  is not on home oxygen therapy. Ventilator:    - No ventilator support    Rehab Therapies: Physical Therapy, Occupational Therapy, and Speech/Language Therapy  Weight Bearing Status/Restrictions: No weight bearing restrictions  Other Medical Equipment (for information only, NOT a DME order):  wheelchair and walker  Other Treatments: ***    Patient's personal belongings (please select all that are sent with patient):  {[facility] DME Belongings:[id number]}    RN SIGNATURE:  Electronically signed by Joce Villasenor RN on 2/7/23 at 9:43 AM EST    CASE MANAGEMENT/SOCIAL WORK SECTION    Inpatient Status Date: 01/29/2023    Readmission Risk Assessment Score:  Readmission Risk              Risk of Unplanned Readmission:  20           Discharging to Facility/ Agency   Name: [facility]  Address: [address] [place] [address],[address]  Phone: [phone]  Fax: [phone]    Dialysis Facility (if applicable)   Name:  Address:  Dialysis Schedule:  Phone:  Fax:    / signature: Electronically signed by Emily Schwab, MSW on 2/7/23 at 8:49 AM EST    PHYSICIAN SECTION    Prognosis: Fair    Condition at Discharge: Stable    Rehab Potential (if transferring to Rehab): Fair    Recommended Labs or Other Treatments After Discharge:     PT/OT and swallow/speech therapy daily    Labs: weekly renal panel    BALA hose to lower extremities knee high    Follow up with nephrology in 2 weeks and PCP in 1 week    Guzman catheter care and remove guzman when physically stronger. Follow up with urology group Dr. Constance Hendricks with voiding concerns. Recommended Diet:  Solid consistency: Soft and Bite-Sized  Liquid consistency:  Thin  Liquid administration via: Cup;Straw     Medication administration:  (whole with water, place in puree if needed)    Physician Certification: I certify the above information and transfer of Tk Joseph  is necessary for the continuing treatment of the diagnosis listed and that he requires [place] for less 30 days.      Update Admission H&P: No change in H&P    PHYSICIAN SIGNATURE:  Electronically signed by Abril Self MD on 2/6/23 at 8:42 AM EST

## 2023-02-07 VITALS
RESPIRATION RATE: 16 BRPM | BODY MASS INDEX: 26.46 KG/M2 | OXYGEN SATURATION: 95 % | WEIGHT: 174.6 LBS | DIASTOLIC BLOOD PRESSURE: 77 MMHG | HEART RATE: 82 BPM | SYSTOLIC BLOOD PRESSURE: 157 MMHG | TEMPERATURE: 97.9 F | HEIGHT: 68 IN

## 2023-02-07 PROCEDURE — 6370000000 HC RX 637 (ALT 250 FOR IP): Performed by: STUDENT IN AN ORGANIZED HEALTH CARE EDUCATION/TRAINING PROGRAM

## 2023-02-07 PROCEDURE — 2580000003 HC RX 258: Performed by: INTERNAL MEDICINE

## 2023-02-07 PROCEDURE — 6370000000 HC RX 637 (ALT 250 FOR IP): Performed by: INTERNAL MEDICINE

## 2023-02-07 PROCEDURE — 6360000002 HC RX W HCPCS: Performed by: INTERNAL MEDICINE

## 2023-02-07 RX ORDER — HALOPERIDOL 5 MG/ML
2 INJECTION INTRAMUSCULAR ONCE
Status: DISCONTINUED | OUTPATIENT
Start: 2023-02-07 | End: 2023-02-07 | Stop reason: HOSPADM

## 2023-02-07 RX ADMIN — MULTIPLE VITAMINS W/ MINERALS TAB 1 TABLET: TAB at 08:23

## 2023-02-07 RX ADMIN — ACETAMINOPHEN 650 MG: 325 TABLET, FILM COATED ORAL at 08:23

## 2023-02-07 RX ADMIN — AMLODIPINE BESYLATE 5 MG: 5 TABLET ORAL at 08:23

## 2023-02-07 RX ADMIN — SODIUM CHLORIDE, PRESERVATIVE FREE 10 ML: 5 INJECTION INTRAVENOUS at 08:24

## 2023-02-07 RX ADMIN — PIPERACILLIN AND TAZOBACTAM 3375 MG: 3; .375 INJECTION, POWDER, LYOPHILIZED, FOR SOLUTION INTRAVENOUS at 06:36

## 2023-02-07 RX ADMIN — FUROSEMIDE 20 MG: 20 TABLET ORAL at 08:23

## 2023-02-07 RX ADMIN — ENOXAPARIN SODIUM 90 MG: 100 INJECTION SUBCUTANEOUS at 08:26

## 2023-02-07 ASSESSMENT — PAIN SCALES - PAIN ASSESSMENT IN ADVANCED DEMENTIA (PAINAD)
CONSOLABILITY: 0
BREATHING: 0
NEGVOCALIZATION: 0
FACIALEXPRESSION: 0
BODYLANGUAGE: 0
TOTALSCORE: 0

## 2023-02-07 NOTE — PLAN OF CARE
Problem: Discharge Planning  Goal: Discharge to home or other facility with appropriate resources  Outcome: Progressing     Problem: Pain  Goal: Verbalizes/displays adequate comfort level or baseline comfort level  Outcome: Progressing     Problem: Safety - Adult  Goal: Free from fall injury  2/7/2023 1118 by Bee Ho RN  Outcome: Progressing     Problem: Confusion  Goal: Confusion, delirium, dementia, or psychosis is improved or at baseline  Description: INTERVENTIONS:  1. Assess for possible contributors to thought disturbance, including medications, impaired vision or hearing, underlying metabolic abnormalities, dehydration, psychiatric diagnoses, and notify attending LIP  2. [place] high risk fall precautions, as indicated  3. Provide frequent short contacts to provide reality reorientation, refocusing and direction  4. Decrease environmental stimuli, including noise as appropriate  5. Monitor and intervene to maintain adequate nutrition, hydration, elimination, sleep and activity  6. If unable to ensure safety without constant attention obtain sitter and review sitter guidelines with assigned personnel  7. Initiate Psychosocial CNS and Spiritual Care consult, as indicated  2/7/2023 1118 by Bee Ho RN  Outcome: Progressing     Problem: Skin/Tissue Integrity  Goal: Absence of new skin breakdown  Description: 1. Monitor for areas of redness and/or skin breakdown  2. Assess vascular access sites hourly  3. Every 4-6 hours minimum:  Change oxygen saturation probe site  4. Every 4-6 hours:  If on nasal continuous positive airway pressure, respiratory therapy assess nares and determine need for appliance change or resting period.   Outcome: Progressing     Problem: ABCDS Injury Assessment  Goal: Absence of physical injury  Outcome: Progressing     Problem: Nutrition Deficit:  Goal: Optimize nutritional status  Outcome: Progressing

## 2023-02-07 NOTE — PLAN OF CARE
Problem: Discharge Planning  Goal: Discharge to home or other facility with appropriate resources  2/7/2023 1119 by Taryn Lazo RN  Outcome: Completed     Problem: Pain  Goal: Verbalizes/displays adequate comfort level or baseline comfort level  2/7/2023 1119 by Taryn Lazo RN  Outcome: Completed     Problem: Safety - Adult  Goal: Free from fall injury  2/7/2023 1119 by Taryn Lazo RN  Outcome: Completed     Problem: Confusion  Goal: Confusion, delirium, dementia, or psychosis is improved or at baseline  Description: INTERVENTIONS:  1. Assess for possible contributors to thought disturbance, including medications, impaired vision or hearing, underlying metabolic abnormalities, dehydration, psychiatric diagnoses, and notify attending LIP  2. [place] high risk fall precautions, as indicated  3. Provide frequent short contacts to provide reality reorientation, refocusing and direction  4. Decrease environmental stimuli, including noise as appropriate  5. Monitor and intervene to maintain adequate nutrition, hydration, elimination, sleep and activity  6. If unable to ensure safety without constant attention obtain sitter and review sitter guidelines with assigned personnel  7. Initiate Psychosocial CNS and Spiritual Care consult, as indicated  2/7/2023 1119 by Taryn Lazo RN  Outcome: Completed     Problem: Skin/Tissue Integrity  Goal: Absence of new skin breakdown  Description: 1. Monitor for areas of redness and/or skin breakdown  2. Assess vascular access sites hourly  3. Every 4-6 hours minimum:  Change oxygen saturation probe site  4. Every 4-6 hours:  If on nasal continuous positive airway pressure, respiratory therapy assess nares and determine need for appliance change or resting period.   2/7/2023 1119 by Taryn Lazo RN  Outcome: Completed     Problem: ABCDS Injury Assessment  Goal: Absence of physical injury  2/7/2023 1119 by Taryn Lazo RN  Outcome: Completed     Problem: Nutrition Deficit:  Goal: Optimize nutritional status  2/7/2023 1119 by Sammy Hermosillo RN  Outcome: Completed

## 2023-02-07 NOTE — PROGRESS NOTES
Patient adjusted again in bed, leaning toward left side. Pillow support. Made another attempt to get patient to eat breakfast. Patient still refusing. Calm, cooperative. States \"Thank you, have a nice day\" as staff leaves the room.

## 2023-02-07 NOTE — PROGRESS NOTES
Patient started shift pleasant and oriented to person/place/time. As the night continued, patient became oriented to self only. VSS. Denies pain. Patient believed that staff was burglars breaking into his home, he became combative with staff and pulling at lines. Patient has had x3 BMs overnight. Etienne remains in place with adequate output. Resting intermittently overnight.

## 2023-02-07 NOTE — PROGRESS NOTES
Assisted patient with speaking with family member. Patient states \"alright, have a good day\" when done talking.

## 2023-02-07 NOTE — PLAN OF CARE
Problem: Safety - Adult  Goal: Free from fall injury  Outcome: Progressing   Bed locked in lowest position. Bed alarm on. Camera in place. Frequent rounds made. Problem: Confusion  Goal: Confusion, delirium, dementia, or psychosis is improved or at baseline  Outcome: Progressing  Patient became more confused overnight to where he was only oriented to self. Patient thought he was at home and staff were burglars breaking in. Patient became aggressive overnight and also pulling at lines. Patient with one or more new problems requiring additional work-up/treatment.

## 2023-02-07 NOTE — PROGRESS NOTES
Physical Therapy  No treatment    Attempted to see pt this AM for PT. Pt in bed, adamantly declining any activity with therapy at this time. Encouragement unsuccessful. \"I don't need any help right now. \" \"Thank you. Have a good day. \"  Will continue per plan of care as pt agreeable.      Lia [place] #[id number]

## 2023-02-07 NOTE — DISCHARGE SUMMARY
HOSPITALISTS DISCHARGE SUMMARY    Patient Demographics    Patient. Sylvia Lynn  Date of Birth. 5/3/1930  MRN. [id number]     Primary care provider. Aditi Enciso DO  (Tel: [phone])    Admit date: 1/27/2023    Discharge date (blank if same as Note Date): Note Date: 2/7/2023     Reason for Hospitalization. Chief Complaint   Patient presents with    Back Pain    Leg Swelling     Pt reports to ED from [facility]. C/o back pain x 2 days. Per ems staff also report LLE has increased swelling with weeping edema          Significant Findings. Principal Problem:    Acute low back pain  Active Problems:    Left leg cellulitis    Ambulatory dysfunction    Chronic anticoagulation    Acute low back pain with disc symptoms, duration less than 6 weeks    Venous stasis dermatitis of both lower extremities    Acute back pain with radiculopathy  Resolved Problems:    * No resolved hospital problems. *       Problems and results from this hospitalization that need follow up. Confusion    Significant test results and incidental findings. FL MODIFIED BARIUM SWALLOW W VIDEO   Final Result   1. Oral and pharyngeal dysphagia without significant aspiration identified. XR CHEST PORTABLE   Final Result      Patchy consolidation in the left lung base. Correlate for pneumonia. Right lung is clear. Normal cardiac mediastinal silhouette. Scoliosis. VL Extremity Venous Bilateral   Final Result      CT THORACIC SPINE WO CONTRAST   Final Result      1. Mild anterior wedging of T6, T7, and T8 without acute fracture plane. The findings are consistent with remote compression fractures and are unchanged from the prior study. No evidence of an acute fracture or traumatic subluxation. 2.  Moderate multilevel degenerative disc disease without bony spinal stenosis. 3.  The shape scoliosis. 4.  Small bilateral pleural effusions.       CT ABDOMEN PELVIS WO CONTRAST Additional Contrast? None   Final Result      1. Limited without IV contrast but no noncontrast CT evidence of malignancy in the abdomen or pelvis. 2.  Trace bilateral pleural effusions. 3.  Status post left nephrectomy. 4.  Small focus of gas in the bladder is likely iatrogenic. 5.  Small fat-containing epigastric hernia. 6.  Advanced vascular calcifications. MRI LUMBAR SPINE WO CONTRAST   Final Result   1. Moderate acquired stenosis at L4-5 related to combined discopathy and significant ligament flavum hypertrophy. This narrows the AP dimension of the thecal sac to approximately 6.7 mm.   2. Mild acquired narrowing of the central canal at L2-3 related to discopathy and ligament flavum hypertrophy   3. Mild left neural exit foraminal narrowing at L2-3, L4-5 related to discopathy and hyperostosis from facet arthrosis   4. Mild narrowing of the right neural extra foramina at L1-2 related to mostly posteriorly oriented endplate osteophyte at L1      CT Head W/O Contrast   Final Result   1. Mild atrophy. 2. Patchy areas of decreased white matter attenuation. The findings are nonspecific, but most likely represent chronic small vessel ischemic change. 3.  No evidence of an acute intracranial process. CT LUMBAR SPINE WO CONTRAST   Final Result      1. No acute osseous abnormality. 2.  Degenerative spondylosis/disc disease resulting in severe central canal stenosis at L2-L3 and mild central canal stenosis at other lumbar spine levels. XR TIBIA FIBULA RIGHT (2 VIEWS)   Final Result   1. No acute fracture or dislocation. Invasive procedures and treatments. History Of Present Illness:   80 y.o. male very hard of hearing with history of dementia and therefore poor historian with a significant past medical history of bilateral lower extremity DVTs, chronic venous stasis who presented to [facility] ED with acute low back pain and difficulty ambulating. At baseline he walks with a walker.   2 days ago he had a fall and since he has been complaining of low back pain and right lower extremity pain and has had difficulty getting around. It is unclear whether his fall was witnessed or not. His baseline cognitive status is also unknown at this time. Patient cannot contribute to history much and there is no one available from the facility to supply any more information. Emergency room his temperature was 97.5, blood pressure 157/86, pulse 77, respirations 16, sats 100% on room air. [facility] Course. Patient did had a prolonged stay in the hospital.  I did discuss with patient's son over the phone as per him patient has not had history of significant dementia. He has been living in independent facility. Patient has been treated for possible infection. Patient still have confusion. CT head has been negative. We agreed that may be changing his location might help his possible underlying delirium. Other possibility might be worsening dementia. Recommend doing voiding trial once patient more strong may be after a week. Acute metabolic encephalopathy:  Head CT was non acute  Improving off narcotics but not at baseline. Added to [facility].  Off restraints and also off sedatives. Continue with rehab efforts and re orientation. R leg cellulitis and CHF  Presented with more swelling , erythema and pain of r leg  Treated with cefazolin. Improved after IV lasix. Venous doppler showed only chronic DVT, nothing acute  ECHO with diastolic dysfunction  BALA hose as tolerated, wound care     Pneumonia:  Cough present, rhonchi, CXR with possible infiltrate left lower  Cont  Zosyn, last day Tuesday. off linezolid as MRSA negative. Acute lower back pain : MRI of back show moderate stenosis of L3-4   Consulted neurosurgery, advised to get CT T spine. No acute pathology found on spinal imaging.  Fall precaution, Tylenol for pain control, lidocaine patch     CKD 3: serum creatinine ~ 1.7 his baseline. Has solitary kidney   Nephrology is following for gentle diuresis       Recurrent DVT/PE:  At home on eliquis, declines meds at times here  On lovenox as substitute   Eliquis on discharge- on 2.5 mg BID at home as per his hematologist      Urinary retention:  Failed voiding trials. H/o remote prostatectomy per family. Seen by urology, advised to keep guzman and voiding trial when stronger. Consults. IP CONSULT TO HOSPITALIST  IP CONSULT TO NEUROSURGERY  IP CONSULT TO NEPHROLOGY  IP CONSULT TO PALLIATIVE CARE  IP CONSULT TO UROLOGY    Physical examination on discharge day. BP (!) 144/68   Pulse 75   Temp 97.5 °F (36.4 °C) (Infrared)   Resp 16   Ht 5' 8\" (1.727 m)   Wt 174 lb 9.7 oz (79.2 kg)   SpO2 95%   BMI 26.55 kg/m²   General appearance. Alert. Looks comfortable. HEENT. Sclera clear. Moist mucus membranes. Cardiovascular. Regular rate and rhythm, normal S1, S2. No murmur. Respiratory. Not using accessory muscles. Clear to auscultation bilaterally, no wheeze. Gastrointestinal. Abdomen soft, non-tender, not distended, normal bowel sounds  Confused at times     Condition at time of discharge stable     Medication instructions provided to patient at discharge. Medication List        START taking these medications      amLODIPine 5 MG tablet  Commonly known as: NORVASC  Take 1 tablet by mouth daily     amoxicillin-clavulanate 500-125 MG per tablet  Commonly known as: Augmentin  Take 1 tablet by mouth 2 times daily for 2 days     apixaban 2.5 MG Tabs tablet  Commonly known as: ELIQUIS  Take 1 tablet by mouth in the morning and 1 tablet in the evening.   Replaces: apixaban starter pack 5 MG Tbpk tablet     furosemide 20 MG tablet  Commonly known as: Lasix  Take 1 tablet by mouth daily            CONTINUE taking these medications      clobetasol 0.05 % cream  Commonly known as: TEMOVATE     diclofenac sodium 1 % Gel  Commonly known as: VOLTAREN     lidocaine 4 % external patch  Place 1 patch onto the skin daily     melatonin 5 MG Tbdp disintegrating tablet  Take 1 tablet by mouth nightly as needed (insomnia)     Multi Vitamin/Minerals Tabs            STOP taking these medications      apixaban starter pack 5 MG Tbpk tablet  Commonly known as: Eliquis DVT/PE Starter Pack  Replaced by: apixaban 2.5 MG Tabs tablet               Where to Get Your Medications        Information about where to get these medications is not yet available    Ask your nurse or doctor about these medications  amLODIPine 5 MG tablet  amoxicillin-clavulanate 500-125 MG per tablet  apixaban 2.5 MG Tabs tablet  furosemide 20 MG tablet         Discharge recommendations given to patient. Follow Up. PCP   Disposition. SNF  Activity. activity as tolerated  Diet: ADULT ORAL NUTRITION SUPPLEMENT; Lunch, Dinner; Standard High Calorie/High Protein Oral Supplement  ADULT DIET; Dysphagia - Soft and Bite Sized      Spent 35 minutes in discharge process.     Signed:  Tex Singh MD     2/7/2023 9:13 AM

## 2023-02-07 NOTE — PROGRESS NOTES
0730-Pt resting in bed with eyes open, very pleasant, able to have sensible conversation, oriented to self and year. Was able to tell me names of all children and repeatedly said this nurses name. Sat up in bed and this nurse set up breakfast pt ate large portion of breakfast and drank Ensure. Very alert at this time. 1100: Pt resting in bed, eyes closed no signs of distress. Son at bedside. [id number] Pt awakes and is incontinent of bowel x5 very aggitated, unsure of name and keeps pulling at PIV  and guzman, repeatedly removes gown and states I need to go to work. Aggressive with words and pushes this nurse hands away multiple time when trying to help. Multiple attempts to get OOB, bed alarm on high level. Dr Grecia Toscano notified at [phone] of pt behavior, attempting to calming measures but no results pt continued to be confused and extremely agitated. Not taking medications for this nurse since 0800. Son arrived at bedside around 1830, updated son, pt ate and drank for son then wanted to go to bathroom. BM on toilet and returned to bed, 1930 pt is now resting in bed with sitter and son at bedside.  Report given to [facility] SALOMÓN

## 2023-02-07 NOTE — PROGRESS NOTES
Occupational Therapy  No treatment    Attempted to see pt for therapy this AM. Heavy encouragement provided for participation in therapy session. Pt declining each attempt stating \"I don't need any help, I don't want too \". Upon exit pt stating \"Have a good day. Thank you\"  Continue OT per POC.      [address] MIGEL/L

## 2023-02-07 NOTE — CARE COORDINATION
Case Management Assessment            Discharge Note                    Date / Time of Note: 2/7/2023 8:50 AM                  Discharge Note Completed by: MARGI Ernandez    Patient Name: Georges Cifuentes   YOB: 1930  Diagnosis: Cellulitis of right lower extremity [E04.286]  Acute low back pain with disc symptoms, duration less than 6 weeks [M54.50, M51.9]  Acute back pain with radiculopathy [M54.10]   Date / Time: 1/27/2023 12:57 PM    Current PCP: Ilana Maria DO  Clinic patient: No    Hospitalization in the last 30 days: No    Advance Directives:  Code Status: DNR-CCA  [facility] DNR form completed and on chart: Yes    Financial:  Payor: Travis Kathleen / Plan: [address] [place] [address] HMO / Product Type: Medicare /      Pharmacy:  No Pharmacies [address] medications?: Potential Assistance Purchasing Medications: No  Assistance provided by Case Management: None at this time    Does patient want to participate in local refill/ meds to beds program?: No    Meds To [facility] Rules:  1. Can ONLY be done Monday- Friday between 8:30am-5pm  2. Prescription(s) must be in pharmacy by 3pm to be filled same day  3. Copy of patient's insurance/ prescription drug card and patient face sheet must be sent along with the prescription(s)  4. Cost of Rx cannot be added to hospital bill. If financial assistance is needed, please contact unit  or ;  or  CANNOT provide pharmacy voucher for patients co-pays  5.  Patients can then  the prescription on their way out of the hospital at discharge, or pharmacy can deliver to the bedside if staff is available. (payment due at time of pick-up or delivery - cash, check, or card accepted)     Able to afford home medications/ co-pay costs: Yes    ADLS:  Current PT AM-PAC Score: 11 /24  Current OT AM-PAC Score: 10 /24      DISCHARGE Disposition: Ryan Terrence (SNF): [facility] Phone: [phone] Fax: [phone]    LOC at discharge: Skilled  [address] Completed: Yes    Notification completed in HENS/PAS?:  Yes : CM has completed HENS online through secure website for SNF admission at [facility]. Document ID #: [id number]    IMM Completed:   No    Transportation:  Transportation PLAN for discharge: EMS transportation   Mode of Transport: Ambulance stretcher - BLS  Reason for medical transport: Other: Acute low back pain, Left leg cellulitis, Ambulatory dysfunction, dementia   Name of [address] [place] [address], [address]:  [facility]   Phone: [phone]  Time of Transport: 1:00 pm    Transport form completed: Yes    Referrals made at [facility] for outpatient continued care:  Not Applicable    Additional CM Notes: Pt is from [place] [address] at [facility]. Pt is discharging to SNF at [facility] with transportation at 1:00 pm. Floridalma Antoinette with [facility] is aware of transport time and accepting of the pt. Pt's sons Joseline Basurto and Vee Song have been made aware of discharge today at 1:00 pm and are in agreement with the discharge plan and time. No other CM needs identified at discharge. Nurse to call report to [phone]  Clinicals faxed to [phone]    The Plan for Transition of Care is related to the following treatment goals of Cellulitis of right lower extremity [L03.115]  Acute low back pain with disc symptoms, duration less than 6 weeks [M54.50, M51.9]  Acute back pain with radiculopathy [M54.10]    The Patient and/or patient representative Ольга Jewell and his family were provided with a choice of provider and agrees with the discharge plan Yes    Freedom of choice list was provided with basic dialogue that supports the patient's individualized plan of care/goals and shares the quality data associated with the providers.  Yes    Care Transitions patient: No    MARGI Helm  [facility]  Case Management Department  Ph: [phone]  Fax: [phone]

## 2023-02-07 NOTE — PROGRESS NOTES
[address] [place] [address] Nephrology   [facility]  [phone]  Nephrology Consult Note          Patient ID: Renaldo Kent  Referring/ Physician: Reagan Castro MD      HPI/Summary:     Renaldo Kent is being seen by nephrology for CKD and DIruesis. Patient is a 80 y.o. M who had a left nephrectomy in 2018. Before then, baseline creatinine was ~1.3. Creatinine is 1.8 > 2.2 > 1.7 post diuresis   BP is better but slightly high   Urine is 1475 ml. No labs today         Plan:       The patient was seen and examined in his room with his son The patient looked pale and frail, but was in no acute distress  The patient still has some leg edema      Continue amlodipine  Lasix 20 mg QD  OK to DC and follow up in 2-3 weeks           Assessment:  CKDIIIb   Due to L Nephrectomy, stable, at current baseline    HTN  Needs better hypertension control, Lasix daily   Edema  W/ chronic venous stasis changes  Likely due to volume status    [facility] would like to thank you for the opportunity to serve this patient. Please call with any questions or concerns. Odette Verdin MD  [facility]  [facility]  [place], [address]  Fax: [phone]  Office: [phone]         CC/Reason for consult:   Reason for consult: CKD, Diuresis   Chief Complaint   Patient presents with    Back Pain    Leg Swelling     Pt reports to ED from [facility]. C/o back pain x 2 days. Per ems staff also report LLE has increased swelling with weeping edema            Review of Systems:   [address] All other remaining systems are negative. Constitutional:  fever, chills, weakness, weight change, fatigue,      Skin:  rash, pruritus, hair loss, bruising, dry skin, petechiae. Head, Face, Neck   headaches, swelling,  cervical adenopathy.      Respiratory: shortness of breath, cough, or wheezing  Cardiovascular: chest pain, palpitations, dizzy, edema  Gastrointestinal: nausea, vomiting, diarrhea, constipation,belly pain    Yellow skin, blood in stool  Musculoskeletal:  back pain, muscle weakness, gait problems,       joint pain or swelling. Genitourinary:  dysuria, poor urine flow, flank pain, blood in urine  Neurologic:  vertigo, TIA'S, syncope, seizures, focal weakness  Psychosocial:  insomnia, anxiety, or depression. 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 Social Hx: reviewed and updated as appropriate  Social History     Tobacco Use    Smoking status: Former     Types: Cigarettes     Quit date: 3/13/1980     Years since quittin.9    Smokeless tobacco: Never    Tobacco comments:     pipe smoker    Substance Use Topics    Alcohol use: Yes     Comment: beer occasional        Family hx: reviewed and updated as appropriate  family history is not on file. Medications:   haloperidol lactate, 2 mg, Once  furosemide, 20 mg, Daily  amLODIPine, 5 mg, Daily  acetaminophen, 650 mg, 4x Daily  piperacillin-tazobactam, 3,375 mg, Q8H  enoxaparin, 1 mg/kg, Daily  lidocaine, 1 patch, Daily  sodium chloride flush, 5-40 mL, 2 times per day  sennosides-docusate sodium, 1 tablet, BID  [Held by provider] apixaban, 2.5 mg, Q12H  therapeutic multivitamin-minerals, , Daily     Morphine and Sulfamethoxazole-trimethoprim    Allergies:    Allergies   Allergen Reactions    Morphine Other (See Comments)     Confusion, delirium    Sulfamethoxazole-Trimethoprim Rash     He claims he doesn't have allergies         Physical Exam/Objective:   Vitals:    02/07/23 0640   BP: (!) 144/68   Pulse: 75   Resp: 16   Temp: 97.5 °F (36.4 °C)   SpO2: 95%       Intake/Output Summary (Last 24 hours) at 2/7/2023 0857  Last data filed at 2/7/2023 0459  Gross per 24 hour   Intake 840 ml   Output 1275 ml   Net -435 ml           General appearance: in no acute distress, comfortable, disorientated  HEENT: no icterus, EOM intact, trachea midline. Neck : no masses, appears symmetrical and no JVD appreciated. Respiratory: Respiratory effort normal, bilateral equal chest rise. No wheeze, no crackles   Cardiovascular: Ausculation shows RRR and  Some edema   Abdomen: abdomen is soft, non distended, no masses, no pain with palpation. Musculoskeletal:  no joint swelling, no deformity, strength grossly normal. Venous stasis changes BL LE  Skin: no rashes, no induration, no tightening, no jaundice   Neuro:  Disorientated, moves all extremities spontaneously. Data:   CBC:   No results for input(s): WBC, HGB, HCT, PLT in the last 72 hours.     BMP:    Recent Labs     02/04/23  0859   *   K 4.2      CO2 20*   BUN 38*   CREATININE 1.7*   GLUCOSE 129*   PHOS 2.1*         Thanks  Nephrology  [id number] [facility] [address] [facility]  Office: [phone]    Fax: [phone]

## 2023-02-07 NOTE — PROGRESS NOTES
Speech Language Pathology  Attempt Note  Attempted to see pt for dysphagia tx. Per chart review, pt refusing PO this AM prior to SLP attempt. Upon entry, pt stated \"Why are you here? \" And continued to state \"I don't need any help\". Refused to eat AM meal with SLP despite encouragement. Will re-attempt as schedule allows.        Marylin Batres, [address] [facility]  Speech-Language Pathologist

## 2023-02-07 NOTE — PROGRESS NOTES
Patient sat up in bed, attempting to get him to eat breakfast. Patient refusing at this time, will continue to try. Patient hesitant to take oral meds; educated on what each med is for, reminded that these are to help him feel better and stay feeling well. Made second attempt at breakfast, patient still refusing at this time. Removing gown, but calm and cooperative.

## 2023-04-16 ENCOUNTER — HOSPITAL ENCOUNTER (EMERGENCY)
Age: 88
Discharge: HOME | End: 2023-04-16
Attending: EMERGENCY MEDICINE
Payer: MEDICARE

## 2023-04-16 VITALS
RESPIRATION RATE: 18 BRPM | OXYGEN SATURATION: 94 % | HEART RATE: 85 BPM | SYSTOLIC BLOOD PRESSURE: 132 MMHG | DIASTOLIC BLOOD PRESSURE: 64 MMHG | WEIGHT: 189 LBS | HEIGHT: 68 IN | TEMPERATURE: 98.4 F | BODY MASS INDEX: 28.64 KG/M2

## 2023-04-16 DIAGNOSIS — Z00.00 ADULT GENERAL MEDICAL EXAM: Primary | ICD-10-CM

## 2023-04-16 PROCEDURE — 99283 EMERGENCY DEPT VISIT LOW MDM: CPT

## 2023-04-16 ASSESSMENT — PAIN - FUNCTIONAL ASSESSMENT: PAIN_FUNCTIONAL_ASSESSMENT: NONE - DENIES PAIN

## 2023-04-16 ASSESSMENT — LIFESTYLE VARIABLES
HOW OFTEN DO YOU HAVE A DRINK CONTAINING ALCOHOL: NEVER
HOW MANY STANDARD DRINKS CONTAINING ALCOHOL DO YOU HAVE ON A TYPICAL DAY: PATIENT DOES NOT DRINK

## 2023-04-17 NOTE — ED PROVIDER NOTES
[id number] [facility] RESIDENT NOTE       Date of evaluation: 4/16/2023    Chief Complaint     No chief complaint on file. of Present Illness     Myriam Jorgensen is a 80 y.o. male who presents with concern for fall at his nursing home. The patient has some cognitive decline and cannot contribute a comprehensive history of present illness or review of systems but per EMS the patient was found awake but on his knees at his nursing home and has a history of falls and therefore was sent to the emergency department for evaluation. Upon arrival he has no acute complaints    Review of Systems     The patient is not reliable for a review of systems in the context of his advanced dementia      Past Medical, Surgical, Family, and Social History     He has a past medical history of Adenocarcinoma of prostate ([address] Anxiety, Arthritis, Cancer ([address] Cataract, CKD (chronic kidney disease) stage 3, GFR 30-59 ml/min ([address] Colon polyps, Dementia ([address] MRSA infection, Retroperitoneal sarcoma ([address] Severe hearing loss, and Venous thromboembolism (VTE). He has a past surgical history that includes Prostate surgery; joint replacement (Right, 2005); and Kidney removal (Left). His family history is not on file. He reports that he quit smoking about 43 years ago. He has never used smokeless tobacco. He reports current alcohol use. He reports that he does not use drugs. Medications     Previous Medications    AMLODIPINE (NORVASC) 5 MG TABLET    Take 1 tablet by mouth daily    APIXABAN (ELIQUIS) 2.5 MG TABS TABLET    Take 1 tablet by mouth in the morning and 1 tablet in the evening.     CLOBETASOL (TEMOVATE) 0.05 % CREAM    Apply topically 2 times daily Apply topically 2 times daily Monday-Friday to R Lower Leg Wound    DICLOFENAC SODIUM (VOLTAREN) 1 % GEL    Apply 4 g topically 2 times daily    FUROSEMIDE (LASIX) 20 MG TABLET    Take 1 tablet by mouth daily    LIDOCAINE 4 % EXTERNAL

## 2023-04-17 NOTE — ED PROVIDER NOTES
ED Attending Attestation Note     Date of evaluation: 4/16/2023    This patient was seen by the resident. I have seen and examined the patient, agree with the workup, evaluation, management and diagnosis. The care plan has been discussed. My assessment reveals patient sent from SNF after being found on knees. No clear history of fall. Patient has no complaints and is very well appearing with no tenderness on exam anywhere. Will transfer back to SNF.      Cheryl Kamara MD  04/16/23 [id number]

## 2023-04-17 NOTE — DISCHARGE INSTRUCTIONS
Evaluation in the emergency department was reassuring. Please discuss further with primary care physician.

## 2024-09-16 NOTE — DISCHARGE SUMMARY
No complaints  Seems to be stable  Not on meds  Continue monitoring symptoms    Pt was discharged with son to go back to [facility]. Pt was able to  prescriptions from pharmacy and was given a lidocaine paper prescription. Pt questions were all answered. Pt took all belongings with him. Pt was educated on side effects of blood thinners and to come to the hospital if there is a fall or unusual bruising. IV was removed which was intact with no bleeding at the site. Tele box was removed.